# Patient Record
Sex: FEMALE | Race: WHITE
[De-identification: names, ages, dates, MRNs, and addresses within clinical notes are randomized per-mention and may not be internally consistent; named-entity substitution may affect disease eponyms.]

---

## 2021-10-04 ENCOUNTER — HOSPITAL ENCOUNTER (INPATIENT)
Dept: HOSPITAL 41 - JD.ED | Age: 81
LOS: 11 days | Discharge: NURSING, REHAB OR LONG-TERM CARE | DRG: 196 | End: 2021-10-15
Attending: HOSPITALIST | Admitting: HOSPITALIST
Payer: MEDICARE

## 2021-10-04 DIAGNOSIS — Z88.6: ICD-10-CM

## 2021-10-04 DIAGNOSIS — Z90.710: ICD-10-CM

## 2021-10-04 DIAGNOSIS — T79.7XXA: ICD-10-CM

## 2021-10-04 DIAGNOSIS — Z86.16: ICD-10-CM

## 2021-10-04 DIAGNOSIS — J84.10: ICD-10-CM

## 2021-10-04 DIAGNOSIS — R77.8: ICD-10-CM

## 2021-10-04 DIAGNOSIS — J93.9: ICD-10-CM

## 2021-10-04 DIAGNOSIS — J96.21: ICD-10-CM

## 2021-10-04 DIAGNOSIS — Z98.890: ICD-10-CM

## 2021-10-04 DIAGNOSIS — Z98.49: ICD-10-CM

## 2021-10-04 DIAGNOSIS — H81.09: ICD-10-CM

## 2021-10-04 DIAGNOSIS — B96.20: ICD-10-CM

## 2021-10-04 DIAGNOSIS — R06.00: ICD-10-CM

## 2021-10-04 DIAGNOSIS — Z99.81: ICD-10-CM

## 2021-10-04 DIAGNOSIS — Z88.5: ICD-10-CM

## 2021-10-04 DIAGNOSIS — Z88.8: ICD-10-CM

## 2021-10-04 DIAGNOSIS — T38.0X5A: ICD-10-CM

## 2021-10-04 DIAGNOSIS — Z87.01: ICD-10-CM

## 2021-10-04 DIAGNOSIS — Z66: ICD-10-CM

## 2021-10-04 DIAGNOSIS — N39.0: ICD-10-CM

## 2021-10-04 DIAGNOSIS — I95.9: ICD-10-CM

## 2021-10-04 DIAGNOSIS — K21.9: ICD-10-CM

## 2021-10-04 DIAGNOSIS — D72.829: ICD-10-CM

## 2021-10-04 DIAGNOSIS — I51.4: ICD-10-CM

## 2021-10-04 DIAGNOSIS — R19.7: ICD-10-CM

## 2021-10-04 DIAGNOSIS — I08.3: ICD-10-CM

## 2021-10-04 DIAGNOSIS — Z79.899: ICD-10-CM

## 2021-10-04 DIAGNOSIS — E87.6: ICD-10-CM

## 2021-10-04 DIAGNOSIS — I82.402: ICD-10-CM

## 2021-10-04 DIAGNOSIS — Z91.09: ICD-10-CM

## 2021-10-04 DIAGNOSIS — Z91.018: ICD-10-CM

## 2021-10-04 DIAGNOSIS — I71.2: ICD-10-CM

## 2021-10-04 DIAGNOSIS — R79.89: ICD-10-CM

## 2021-10-04 DIAGNOSIS — R09.02: Primary | ICD-10-CM

## 2021-10-04 PROCEDURE — 99285 EMERGENCY DEPT VISIT HI MDM: CPT

## 2021-10-04 PROCEDURE — 87186 SC STD MICRODIL/AGAR DIL: CPT

## 2021-10-04 PROCEDURE — 85379 FIBRIN DEGRADATION QUANT: CPT

## 2021-10-04 PROCEDURE — 36600 WITHDRAWAL OF ARTERIAL BLOOD: CPT

## 2021-10-04 PROCEDURE — 85025 COMPLETE CBC W/AUTO DIFF WBC: CPT

## 2021-10-04 PROCEDURE — 80053 COMPREHEN METABOLIC PANEL: CPT

## 2021-10-04 PROCEDURE — 82803 BLOOD GASES ANY COMBINATION: CPT

## 2021-10-04 PROCEDURE — 87086 URINE CULTURE/COLONY COUNT: CPT

## 2021-10-04 PROCEDURE — 71045 X-RAY EXAM CHEST 1 VIEW: CPT

## 2021-10-04 PROCEDURE — 84145 PROCALCITONIN (PCT): CPT

## 2021-10-04 PROCEDURE — 94640 AIRWAY INHALATION TREATMENT: CPT

## 2021-10-04 PROCEDURE — 93005 ELECTROCARDIOGRAM TRACING: CPT

## 2021-10-04 PROCEDURE — 36415 COLL VENOUS BLD VENIPUNCTURE: CPT

## 2021-10-04 PROCEDURE — 96375 TX/PRO/DX INJ NEW DRUG ADDON: CPT

## 2021-10-04 PROCEDURE — 96365 THER/PROPH/DIAG IV INF INIT: CPT

## 2021-10-04 PROCEDURE — 87088 URINE BACTERIA CULTURE: CPT

## 2021-10-04 PROCEDURE — 86140 C-REACTIVE PROTEIN: CPT

## 2021-10-04 PROCEDURE — 84484 ASSAY OF TROPONIN QUANT: CPT

## 2021-10-04 PROCEDURE — 83880 ASSAY OF NATRIURETIC PEPTIDE: CPT

## 2021-10-04 PROCEDURE — 87899 AGENT NOS ASSAY W/OPTIC: CPT

## 2021-10-04 PROCEDURE — 86738 MYCOPLASMA ANTIBODY: CPT

## 2021-10-04 PROCEDURE — 83735 ASSAY OF MAGNESIUM: CPT

## 2021-10-04 PROCEDURE — 81001 URINALYSIS AUTO W/SCOPE: CPT

## 2021-10-04 PROCEDURE — 71275 CT ANGIOGRAPHY CHEST: CPT

## 2021-10-04 RX ADMIN — Medication PRN ML: at 15:30

## 2021-10-04 RX ADMIN — Medication PRN ML: at 18:12

## 2021-10-04 NOTE — EDM.PDOC
<Lul Oviedo - Last Filed: 10/05/21 11:12>





ED HPI GENERAL MEDICAL PROBLEM





- General


Chief Complaint: Respiratory Problem


Stated Complaint: LOW OXYGEN


Time Seen by Provider: 10/04/21 15:07





- Related Data


                                    Allergies











Allergy/AdvReac Type Severity Reaction Status Date / Time


 


acetaminophen Allergy  Rash Verified 10/04/21 17:26


 


Beef Containing Products Allergy  Hives Verified 10/05/21 12:26


 


Pork/Porcine Containing Allergy  Hives Verified 10/05/21 12:25





Products     


 


soap Allergy  Rash Verified 10/05/21 12:27


 


codeine AdvReac  Nausea and Verified 10/05/21 12:25





   Vomiting  











Home Meds: 


                                    Home Meds





Cholecalciferol (Vitamin D3) [Vitamin D] 4,000 unit PO DAILY 10/05/21 [History]


Fish Oil/Borage/Flax/Om3,6,9 1 [Omega 3-6-9 Complex Softgel] 1 each PO DAILY 10

/05/21 [History]


Mv-Mn/Folic AC/Calcium/Vit K1 [Women 50 Plus Multivit Adv Tab] 1 tab PO DAILY 

10/05/21 [History]


Zinc 1 tab PO DAILY 10/05/21 [History]











Course





- Re-Assessments/Exams


Free Text/Narrative Re-Assessment/Exam: 





10/05/21 08:22.  Have assumed care from Dr Ugalde after another change of 

shift.  I agree with hx and exam initially done by JSUTINE Lugo about 18 hrs ago. 

 


Pt is reported to have done OK during the night, continues on high flow 02 60 L,

 95 %, sats currently 95 to 96 %.  RT reported did try lower her her O2 but sats

 are reported to have fallen with that.  We continue to wait for a bed to open 

up somewhere in the region.  


Looking over her chart I see that the CT report did report increased bilat 

density compatable with fibrosis, consider superimposed pnemonia.  However there

 is no report of worsening cough, fever or chills so pneumonia was not the 

primary consideration last evening.  The greater concern was her increased 

oxygen demand and her elevated trop of 0.127. 


Have ordered a repeat trop.  Have ordered solumedrol 125 mg IV and a duoneb.  

Will see if we can start weaning her oxygen requirement as we further await a 

bed.  Of note she has requested to be NALINI ZHAO. 





10/05/21 09:07.  On further review of labs I see there is a urine that has come 

back showing UTI, 1 + leuk Pos.,  20-30 WBC.  Have ordered a urine culture, 

rocephin 1 gram IV.  I am informed that the neb treatment helped her, feels like

 it has helped her breathing





10/05/21 09:08








Departure





- Departure


Time of Disposition: 11:00


Disposition: Admitted As Inpatient 68


Condition: Serious


Clinical Impression: 


 Hypoxia





Dyspnea


Qualifiers:


 Dyspnea type: unspecified Qualified Code(s): R06.00 - Dyspnea, unspecified





UTI (urinary tract infection)


Qualifiers:


 Urinary tract infection type: site unspecified Hematuria presence: without 

hematuria Qualified Code(s): N39.0 - Urinary tract infection, site not specified








- Discharge Information





ED Communication





- Discussed Case With (1)


Discussed Case With (1): Admitting Provider ( discussed with Dr Eid, decision 

to admit at about 1100.)





<Hilton Lugo - Last Filed: 10/08/21 11:07>





ED HPI GENERAL MEDICAL PROBLEM





- General


Source of Information: Reports: Patient, Family


History Limitations: Reports: No Limitations





- History of Present Illness


INITIAL COMMENTS - FREE TEXT/NARRATIVE: 





81-year-old female presents the emergency department with complaints of low 

oxygen saturations.  Per the patient and her  report, the patient was 

hospitalized at [facility] in [place] for 2 weeks for Covid pneumonia.  

She states that she has been home approximately 2 weeks on home oxygen.  Prior 

to this she was not oxygen dependent.  Patient's  reports that in the 

middle the night last night, the patient's O2 saturations dropped down to a low 

as low as 50% and he increased her oxygen to 8 L per nasal cannula.  Patient 

denies any significant medical history and states she only takes Ativan twice a 

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  She does not have a history of smoking.  She denies

 any recent fever, chills, nausea, vomiting or diarrhea.  She denies any urinary

 symptoms.





Past Medical History


Respiratory History: Reports: Other (See Below)


Other Respiratory History: meiners disease; covid pneumonia


Gastrointestinal History: Reports: GERD





- Infectious Disease History


Infectious Disease History: Reports: Chicken Pox, Measles, Mumps





- Past Surgical History


HEENT Surgical History: Reports: Adenoidectomy, Cataract Surgery, Tonsillectomy


GI Surgical History: Reports: Hernia, Inguinal


Female  Surgical History: Reports: Hysterectomy





Social & Family History





- Tobacco Use


Tobacco Use Status *Q: Never Tobacco User





- Caffeine Use


Caffeine Use: Reports: Coffee, Tea





- Recreational Drug Use


Recreational Drug Use: No





ED ROS GENERAL





- Review of Systems


Review Of Systems: Comprehensive ROS is negative, except as noted in HPI.





ED EXAM, GENERAL





- Physical Exam


Exam: See Below


Exam Limited By: No Limitations


General Appearance: Alert, WD/WN, Moderate Distress


Ears: Normal External Exam, Hearing Grossly Normal


Nose: Normal Inspection


Throat/Mouth: Normal Inspection, Normal Lips, Normal Voice, No Airway Compromise


Head: Atraumatic


Neck: Normal Inspection, Supple


Respiratory/Chest: Chest Non-Tender, Respiratory Distress, Decreased Breath 

Sounds, Crackles (Left lower lobe)


Cardiovascular: Normal Peripheral Pulses, No Edema, No Murmur, Tachycardia


Peripheral Pulses: 2+: Radial (L), Radial (R)


GI/Abdominal: Normal Bowel Sounds, Soft, Non-Tender, No Distention


 (Female) Exam: Deferred


Rectal (Female) Exam: Deferred


Back Exam: Normal Inspection


Extremities: Normal Inspection


Neurological: Alert, Oriented, Normal Cognition


Psychiatric: Normal Affect, Normal Mood


Skin Exam: Warm, Dry, Intact, Normal Color, No Rash


Lymphatic: No Adenopathy


  ** #1 Interpretation


EKG Date: 10/04/21


Time: 15:28


Rhythm: NSR


Rate (Beats/Min): 109


Axis: Normal


P-Wave: Present


QRS: Normal


ST-T: Normal


QT: Normal


Comparison: NA - No Prior EKG


EKG Interpretation Comments: 





Per Dr. Oviedo interpretation: Sinus tach at a rate of 109; LAFB; very mild ST 

elevation in V2 and V3





Course





- Vital Signs


Text/Narrative:: 





Attestation above, patient presents with a history of Covid.  Patient was sent 

home on home O2.  Over the night, patient's O2 saturations dropped into the 50s 

and her oxygen was increased to 8 L per nasal cannula per the patient's .

  At the time of my exam, the patient is currently on a nonrebreather mask and 

O2 saturations are at about 90 to 91%.  She is dyspneic at rest but only to 

speak in a few word sentences.  Physical exam reveals crackles noted to the left

 posterior lobe.  I have ordered labs to include CBC, CMP, magnesium, C-reactive

 protein, troponin, pro BNP, portable chest x-ray and an EKG.  We will also 

obtain ABGs.  Will order an albuterol nebulizer for this patient.


Last Recorded V/S: 


                                Last Vital Signs











Temp  97.2 F   10/08/21 03:11


 


Pulse  104 H  10/08/21 03:11


 


Resp  28 H  10/08/21 03:11


 


BP  146/94 H  10/08/21 03:11


 


Pulse Ox  93 L  10/08/21 08:59














- Orders/Labs/Meds


Orders: 


                                Medication Orders





Albuterol (Albuterol 0.083% 2.5 Mg/3 Ml Neb Soln)  2.5 mg NEB Q2H PRN


   PRN Reason: Shortness Of Breath/wheezing


Albuterol/Ipratropium (Albuterol/Ipratropium 3.0-0.5 Mg/3 Ml Neb Soln)  3 ml NEB

 QIDRT WILLEM


   Last Admin: 10/08/21 08:59  Dose: 3 ml


   Documented by: JASPAL


   Admin: 10/08/21 05:59  Dose: 3 ml


   Documented by: ILANA


   Admin: 10/07/21 20:18  Dose: 3 ml


   Documented by: ILANA


   Admin: 10/07/21 15:02  Dose: 3 ml


   Documented by: TRISH


   Admin: 10/07/21 09:01  Dose: 3 ml


   Documented by: TRISH


   Admin: 10/07/21 05:55  Dose: 3 ml


   Documented by: ILANA


   Admin: 10/06/21 20:25  Dose: 3 ml


   Documented by: ILANA


   Admin: 10/06/21 14:59  Dose: 3 ml


   Documented by: TRISH


   Admin: 10/06/21 09:55  Dose: 3 ml


   Documented by: TRISH


   Admin: 10/06/21 06:19  Dose: 3 ml


   Documented by: DE


   Admin: 10/05/21 21:50  Dose: 3 ml


   Documented by: DE


   Admin: 10/05/21 18:27 Dose:  Not Given


   Documented by: BRADEN


   Admin: 10/05/21 13:43  Dose: 3 ml


   Documented by: BRADEN


Docusate Sodium (Docusate Sodium 100 Mg Cap)  100 mg PO Q12H PRN


   PRN Reason: Constipation


   Last Admin: 10/07/21 09:12  Dose: 100 mg


   Documented by: EDEEGRA


Enoxaparin Sodium (Enoxaparin 60 Mg/0.6 Ml Syringe)  60 mg SUBCUT Q12H [facility]


   Last Admin: 10/08/21 09:56  Dose: 60 mg


   Documented by: BRUCE


   Admin: 10/07/21 22:00  Dose: 60 mg


   Documented by: JENNIFER


Furosemide (Furosemide 20 Mg/2 Ml Vial)  20 mg IVPUSH DAILY [facility]


Guaifenesin (Guaifenesin 600 Mg Tab.Er)  600 mg PO BID [facility]


   Last Admin: 10/08/21 09:56  Dose: 600 mg


   Documented by: BRUCE


   Admin: 10/07/21 22:00  Dose: 600 mg


   Documented by: JENNIFER


   Admin: 10/07/21 09:12  Dose: 600 mg


   Documented by: KORY


   Admin: 10/06/21 21:48  Dose: 600 mg


   Documented by: JENNIFER


   Admin: 10/06/21 08:57  Dose: 600 mg


   Documented by: LAYTON


   Admin: 10/05/21 21:33  Dose: 600 mg


   Documented by: JENNIFER


   Admin: 10/05/21 15:56  Dose: 600 mg


   Documented by: GILES


Potassium Chloride 10 meq/ (Premix)  100 mls @ 100 mls/hr IV Q1H [facility]


   Stop: 10/08/21 12:59


   Last Admin: 10/08/21 09:55  Dose: 100 mls/hr


   Documented by: BRUCE


Sodium Chloride (Normal Saline)  250 mls @ 40 mls/hr IV ASDIRECTED [facility]


   Last Admin: 10/08/21 09:54  Dose: 40 mls/hr


   Documented by: BRUCE


Methylprednisolone Sodium Succinate (Methylprednisolone Sodium Succinate 40 Mg/1

 Ml Sdv)  60 mg IVPUSH Q8H [facility]


   Last Admin: 10/08/21 09:39  Dose: 60 mg


   Documented by: BRUCE


Ondansetron HCl (Ondansetron 4 Mg/2 Ml Sdv)  4 mg IV Q6H PRN


   PRN Reason: Nausea/Vomiting


Sodium Chloride (Sodium Chloride 0.9% 10 Ml Syringe)  10 ml FLUSH ASDIRECTED PRN


   PRN Reason: Keep Vein Open


   Last Admin: 10/04/21 18:12  Dose: 10 ml


   Documented by: JOSE ANGEL


   Admin: 10/04/21 15:30  Dose: 10 ml


   Documented by: CRYSTAL


Trimethoprim/Sulfamethoxazole (Sulfamethoxazole/Trimethoprim 800-160 Mg Tab)  1 

tab PO BID [facility]


   Last Admin: 10/08/21 09:56  Dose: 1 tab


   Documented by: BRUCE


Warfarin Sodium (Pharmacy To Dose - Warfarin)  1 dose .XX ASDIRECTED PRN


   PRN Reason: RX TOD DOSE WARFARIN


Warfarin Sodium (Warfarin 4 Mg Tab)  4 mg PO QPM [facility]


   Stop: 10/08/21 18:01








Labs: 


                                Laboratory Tests











  10/04/21 10/04/21 10/04/21 Range/Units





  15:10 15:10 15:10 


 


WBC  8.57    (3.98-10.04)  K/mm3


 


RBC  4.28    (3.98-5.22)  M/mm3


 


Hgb  13.0    (11.2-15.7)  gm/dl


 


Hct  38.5    (34.1-44.9)  %


 


MCV  90.0    (79.4-94.8)  fl


 


MCH  30.4    (25.6-32.2)  pg


 


MCHC  33.8    (32.2-35.5)  g/dl


 


RDW Std Deviation  45.2    (36.4-46.3)  fL


 


Plt Count  250    (182-369)  K/mm3


 


MPV  10.3    (9.4-12.3)  fl


 


Neut % (Auto)  75.7 H    (34.0-71.1)  %


 


Lymph % (Auto)  9.9 L    (19.3-51.7)  %


 


Mono % (Auto)  13.3 H    (4.7-12.5)  %


 


Eos % (Auto)  0.5 L    (0.7-5.8)  


 


Baso % (Auto)  0.4    (0.1-1.2)  %


 


Neut # (Auto)  6.49 H    (1.56-6.13)  K/mm3


 


Lymph # (Auto)  0.85 L    (1.18-3.74)  K/mm3


 


Mono # (Auto)  1.14 H    (0.24-0.36)  K/mm3


 


Eos # (Auto)  0.04    (0.04-0.36)  K/mm3


 


Baso # (Auto)  0.03    (0.01-0.08)  K/mm3


 


D-Dimer, Quantitative   8.16 H   (0.19-0.50)  mg/L


 


Puncture Site     


 


ABG pH     (7.35-7.45)  


 


ABG pCO2     (35.0-45.0)  mmHg


 


ABG pO2     (80.0-100.0)  mmHg


 


ABG HCO3     (22.0-26.0)  meq/L


 


ABG O2 Saturation     (96.0-97.0)  %


 


ABG Base Excess     (-2-2.0)  


 


Gianni Test     


 


A-a Gradient     mmHg


 


Oxygen Flow Rate     


 


FiO2     (21..00)  %


 


Sodium    129 L  (136-145)  mEq/L


 


Potassium    3.8  (3.5-5.1)  mEq/L


 


Chloride    94 L  ()  mEq/L


 


Carbon Dioxide    24  (21-32)  mEq/L


 


Anion Gap    14.8  (5-15)  


 


BUN    15  (7-18)  mg/dL


 


Creatinine    0.6  (0.55-1.02)  mg/dL


 


Est Cr Clr Drug Dosing    57.37  mL/min


 


Estimated GFR (MDRD)    > 60  (>60)  mL/min


 


BUN/Creatinine Ratio    25.0 H  (14-18)  


 


Glucose    126 H  (70-99)  mg/dL


 


Calcium    8.7  (8.5-10.1)  mg/dL


 


Magnesium    1.8  (1.8-2.4)  mg/dL


 


Total Bilirubin    0.6  (0.2-1.0)  mg/dL


 


AST    36  (15-37)  U/L


 


ALT    31  (14-59)  U/L


 


Alkaline Phosphatase    86  ()  U/L


 


Troponin I    0.127 H*  (0.00-0.056)  ng/mL


 


C-Reactive Protein    29.3 H*  (<1.0)  mg/dL


 


NT-Pro-B Natriuret Pep     (0-450)  pg/mL


 


Total Protein    7.1  (6.4-8.2)  g/dl


 


Albumin    2.1 L  (3.4-5.0)  g/dl


 


Globulin    5.0  gm/dL


 


Albumin/Globulin Ratio    0.4 L  (1-2)  


 


Procalcitonin     ng/mL


 


Urine Color     (Yellow)  


 


Urine Appearance     (Clear)  


 


Urine pH     (5.0-8.0)  


 


Ur Specific Gravity     (1.005-1.030)  


 


Urine Protein     (Negative)  


 


Urine Glucose (UA)     (Negative)  


 


Urine Ketones     (Negative)  


 


Urine Occult Blood     (Negative)  


 


Urine Nitrite     (Negative)  


 


Urine Bilirubin     (Negative)  


 


Urine Urobilinogen     (0.2-1.0)  


 


Ur Leukocyte Esterase     (Negative)  


 


Urine RBC     (0-5)  /hpf


 


Urine WBC     (0-5)  /hpf


 


Urine WBC Clumps     (NOT SEEN)  /hpf


 


Ur Squamous Epith Cells     (0-5)  /hpf


 


Urine Bacteria     (FEW)  /hpf


 


Urine Mucus     (FEW)  /hpf


 


Mycoplasma pneumon IgM     (NEGATIVE)  














  10/04/21 10/04/21 10/04/21 Range/Units





  15:10 15:18 19:10 


 


WBC     (3.98-10.04)  K/mm3


 


RBC     (3.98-5.22)  M/mm3


 


Hgb     (11.2-15.7)  gm/dl


 


Hct     (34.1-44.9)  %


 


MCV     (79.4-94.8)  fl


 


MCH     (25.6-32.2)  pg


 


MCHC     (32.2-35.5)  g/dl


 


RDW Std Deviation     (36.4-46.3)  fL


 


Plt Count     (182-369)  K/mm3


 


MPV     (9.4-12.3)  fl


 


Neut % (Auto)     (34.0-71.1)  %


 


Lymph % (Auto)     (19.3-51.7)  %


 


Mono % (Auto)     (4.7-12.5)  %


 


Eos % (Auto)     (0.7-5.8)  


 


Baso % (Auto)     (0.1-1.2)  %


 


Neut # (Auto)     (1.56-6.13)  K/mm3


 


Lymph # (Auto)     (1.18-3.74)  K/mm3


 


Mono # (Auto)     (0.24-0.36)  K/mm3


 


Eos # (Auto)     (0.04-0.36)  K/mm3


 


Baso # (Auto)     (0.01-0.08)  K/mm3


 


D-Dimer, Quantitative     (0.19-0.50)  mg/L


 


Puncture Site   Lt radial   


 


ABG pH   7.44   (7.35-7.45)  


 


ABG pCO2   30.9 L   (35.0-45.0)  mmHg


 


ABG pO2   58.0 L   (80.0-100.0)  mmHg


 


ABG HCO3   20.5 L   (22.0-26.0)  meq/L


 


ABG O2 Saturation   86.0 L   (96.0-97.0)  %


 


ABG Base Excess   -2.3 L   (-2-2.0)  


 


Gianni Test   Positive   


 


A-a Gradient   474   mmHg


 


Oxygen Flow Rate   15.0   


 


FiO2   80.00   (21..00)  %


 


Sodium     (136-145)  mEq/L


 


Potassium     (3.5-5.1)  mEq/L


 


Chloride     ()  mEq/L


 


Carbon Dioxide     (21-32)  mEq/L


 


Anion Gap     (5-15)  


 


BUN     (7-18)  mg/dL


 


Creatinine     (0.55-1.02)  mg/dL


 


Est Cr Clr Drug Dosing     mL/min


 


Estimated GFR (MDRD)     (>60)  mL/min


 


BUN/Creatinine Ratio     (14-18)  


 


Glucose     (70-99)  mg/dL


 


Calcium     (8.5-10.1)  mg/dL


 


Magnesium     (1.8-2.4)  mg/dL


 


Total Bilirubin     (0.2-1.0)  mg/dL


 


AST     (15-37)  U/L


 


ALT     (14-59)  U/L


 


Alkaline Phosphatase     ()  U/L


 


Troponin I    0.156 H*  (0.00-0.056)  ng/mL


 


C-Reactive Protein     (<1.0)  mg/dL


 


NT-Pro-B Natriuret Pep  3117 H    (0-450)  pg/mL


 


Total Protein     (6.4-8.2)  g/dl


 


Albumin     (3.4-5.0)  g/dl


 


Globulin     gm/dL


 


Albumin/Globulin Ratio     (1-2)  


 


Procalcitonin     ng/mL


 


Urine Color     (Yellow)  


 


Urine Appearance     (Clear)  


 


Urine pH     (5.0-8.0)  


 


Ur Specific Gravity     (1.005-1.030)  


 


Urine Protein     (Negative)  


 


Urine Glucose (UA)     (Negative)  


 


Urine Ketones     (Negative)  


 


Urine Occult Blood     (Negative)  


 


Urine Nitrite     (Negative)  


 


Urine Bilirubin     (Negative)  


 


Urine Urobilinogen     (0.2-1.0)  


 


Ur Leukocyte Esterase     (Negative)  


 


Urine RBC     (0-5)  /hpf


 


Urine WBC     (0-5)  /hpf


 


Urine WBC Clumps     (NOT SEEN)  /hpf


 


Ur Squamous Epith Cells     (0-5)  /hpf


 


Urine Bacteria     (FEW)  /hpf


 


Urine Mucus     (FEW)  /hpf


 


Mycoplasma pneumon IgM     (NEGATIVE)  














  10/04/21 10/05/21 10/05/21 Range/Units





  23:30 07:56 09:36 


 


WBC     (3.98-10.04)  K/mm3


 


RBC     (3.98-5.22)  M/mm3


 


Hgb     (11.2-15.7)  gm/dl


 


Hct     (34.1-44.9)  %


 


MCV     (79.4-94.8)  fl


 


MCH     (25.6-32.2)  pg


 


MCHC     (32.2-35.5)  g/dl


 


RDW Std Deviation     (36.4-46.3)  fL


 


Plt Count     (182-369)  K/mm3


 


MPV     (9.4-12.3)  fl


 


Neut % (Auto)     (34.0-71.1)  %


 


Lymph % (Auto)     (19.3-51.7)  %


 


Mono % (Auto)     (4.7-12.5)  %


 


Eos % (Auto)     (0.7-5.8)  


 


Baso % (Auto)     (0.1-1.2)  %


 


Neut # (Auto)     (1.56-6.13)  K/mm3


 


Lymph # (Auto)     (1.18-3.74)  K/mm3


 


Mono # (Auto)     (0.24-0.36)  K/mm3


 


Eos # (Auto)     (0.04-0.36)  K/mm3


 


Baso # (Auto)     (0.01-0.08)  K/mm3


 


D-Dimer, Quantitative     (0.19-0.50)  mg/L


 


Puncture Site     


 


ABG pH     (7.35-7.45)  


 


ABG pCO2     (35.0-45.0)  mmHg


 


ABG pO2     (80.0-100.0)  mmHg


 


ABG HCO3     (22.0-26.0)  meq/L


 


ABG O2 Saturation     (96.0-97.0)  %


 


ABG Base Excess     (-2-2.0)  


 


Gianni Test     


 


A-a Gradient     mmHg


 


Oxygen Flow Rate     


 


FiO2     (21..00)  %


 


Sodium     (136-145)  mEq/L


 


Potassium     (3.5-5.1)  mEq/L


 


Chloride     ()  mEq/L


 


Carbon Dioxide     (21-32)  mEq/L


 


Anion Gap     (5-15)  


 


BUN     (7-18)  mg/dL


 


Creatinine     (0.55-1.02)  mg/dL


 


Est Cr Clr Drug Dosing     mL/min


 


Estimated GFR (MDRD)     (>60)  mL/min


 


BUN/Creatinine Ratio     (14-18)  


 


Glucose     (70-99)  mg/dL


 


Calcium     (8.5-10.1)  mg/dL


 


Magnesium     (1.8-2.4)  mg/dL


 


Total Bilirubin     (0.2-1.0)  mg/dL


 


AST     (15-37)  U/L


 


ALT     (14-59)  U/L


 


Alkaline Phosphatase     ()  U/L


 


Troponin I   0.067 H*   (0.00-0.056)  ng/mL


 


C-Reactive Protein     (<1.0)  mg/dL


 


NT-Pro-B Natriuret Pep     (0-450)  pg/mL


 


Total Protein     (6.4-8.2)  g/dl


 


Albumin     (3.4-5.0)  g/dl


 


Globulin     gm/dL


 


Albumin/Globulin Ratio     (1-2)  


 


Procalcitonin    0.29 H  ng/mL


 


Urine Color  Yellow    (Yellow)  


 


Urine Appearance  Slt cloudy H    (Clear)  


 


Urine pH  6.0    (5.0-8.0)  


 


Ur Specific Gravity  1.010    (1.005-1.030)  


 


Urine Protein  1+ H    (Negative)  


 


Urine Glucose (UA)  Negative    (Negative)  


 


Urine Ketones  3+ H    (Negative)  


 


Urine Occult Blood  2+ H    (Negative)  


 


Urine Nitrite  Negative    (Negative)  


 


Urine Bilirubin  Negative    (Negative)  


 


Urine Urobilinogen  0.2    (0.2-1.0)  


 


Ur Leukocyte Esterase  1+ H    (Negative)  


 


Urine RBC  5-10 H    (0-5)  /hpf


 


Urine WBC  20-30 H    (0-5)  /hpf


 


Urine WBC Clumps  Rare    (NOT SEEN)  /hpf


 


Ur Squamous Epith Cells  0-5    (0-5)  /hpf


 


Urine Bacteria  Moderate H    (FEW)  /hpf


 


Urine Mucus  Few    (FEW)  /hpf


 


Mycoplasma pneumon IgM     (NEGATIVE)  














  10/05/21 Range/Units





  09:36 


 


WBC   (3.98-10.04)  K/mm3


 


RBC   (3.98-5.22)  M/mm3


 


Hgb   (11.2-15.7)  gm/dl


 


Hct   (34.1-44.9)  %


 


MCV   (79.4-94.8)  fl


 


MCH   (25.6-32.2)  pg


 


MCHC   (32.2-35.5)  g/dl


 


RDW Std Deviation   (36.4-46.3)  fL


 


Plt Count   (182-369)  K/mm3


 


MPV   (9.4-12.3)  fl


 


Neut % (Auto)   (34.0-71.1)  %


 


Lymph % (Auto)   (19.3-51.7)  %


 


Mono % (Auto)   (4.7-12.5)  %


 


Eos % (Auto)   (0.7-5.8)  


 


Baso % (Auto)   (0.1-1.2)  %


 


Neut # (Auto)   (1.56-6.13)  K/mm3


 


Lymph # (Auto)   (1.18-3.74)  K/mm3


 


Mono # (Auto)   (0.24-0.36)  K/mm3


 


Eos # (Auto)   (0.04-0.36)  K/mm3


 


Baso # (Auto)   (0.01-0.08)  K/mm3


 


D-Dimer, Quantitative   (0.19-0.50)  mg/L


 


Puncture Site   


 


ABG pH   (7.35-7.45)  


 


ABG pCO2   (35.0-45.0)  mmHg


 


ABG pO2   (80.0-100.0)  mmHg


 


ABG HCO3   (22.0-26.0)  meq/L


 


ABG O2 Saturation   (96.0-97.0)  %


 


ABG Base Excess   (-2-2.0)  


 


Gianni Test   


 


A-a Gradient   mmHg


 


Oxygen Flow Rate   


 


FiO2   (21..00)  %


 


Sodium   (136-145)  mEq/L


 


Potassium   (3.5-5.1)  mEq/L


 


Chloride   ()  mEq/L


 


Carbon Dioxide   (21-32)  mEq/L


 


Anion Gap   (5-15)  


 


BUN   (7-18)  mg/dL


 


Creatinine   (0.55-1.02)  mg/dL


 


Est Cr Clr Drug Dosing   mL/min


 


Estimated GFR (MDRD)   (>60)  mL/min


 


BUN/Creatinine Ratio   (14-18)  


 


Glucose   (70-99)  mg/dL


 


Calcium   (8.5-10.1)  mg/dL


 


Magnesium   (1.8-2.4)  mg/dL


 


Total Bilirubin   (0.2-1.0)  mg/dL


 


AST   (15-37)  U/L


 


ALT   (14-59)  U/L


 


Alkaline Phosphatase   ()  U/L


 


Troponin I   (0.00-0.056)  ng/mL


 


C-Reactive Protein   (<1.0)  mg/dL


 


NT-Pro-B Natriuret Pep   (0-450)  pg/mL


 


Total Protein   (6.4-8.2)  g/dl


 


Albumin   (3.4-5.0)  g/dl


 


Globulin   gm/dL


 


Albumin/Globulin Ratio   (1-2)  


 


Procalcitonin   ng/mL


 


Urine Color   (Yellow)  


 


Urine Appearance   (Clear)  


 


Urine pH   (5.0-8.0)  


 


Ur Specific Gravity   (1.005-1.030)  


 


Urine Protein   (Negative)  


 


Urine Glucose (UA)   (Negative)  


 


Urine Ketones   (Negative)  


 


Urine Occult Blood   (Negative)  


 


Urine Nitrite   (Negative)  


 


Urine Bilirubin   (Negative)  


 


Urine Urobilinogen   (0.2-1.0)  


 


Ur Leukocyte Esterase   (Negative)  


 


Urine RBC   (0-5)  /hpf


 


Urine WBC   (0-5)  /hpf


 


Urine WBC Clumps   (NOT SEEN)  /hpf


 


Ur Squamous Epith Cells   (0-5)  /hpf


 


Urine Bacteria   (FEW)  /hpf


 


Urine Mucus   (FEW)  /hpf


 


Mycoplasma pneumon IgM  Negative  (NEGATIVE)  











Meds: 


Medications











Generic Name Dose Route Start Last Admin





  Trade Name Freq  PRN Reason Stop Dose Admin


 


Albuterol  2.5 mg  10/05/21 11:19 





  Albuterol 0.083% 2.5 Mg/3 Ml Neb Soln  NEB  





  Q2H PRN  





  Shortness Of Breath/wheezing  


 


Albuterol/Ipratropium  3 ml  10/05/21 16:00  10/08/21 08:59





  Albuterol/Ipratropium 3.0-0.5 Mg/3 Ml Neb Soln  NEB   3 ml





  QIDRT WILLEM   Administration


 


Docusate Sodium  100 mg  10/05/21 11:19  10/07/21 09:12





  Docusate Sodium 100 Mg Cap  PO   100 mg





  Q12H PRN   Administration





  Constipation  


 


Enoxaparin Sodium  60 mg  10/07/21 21:00  10/08/21 09:56





  Enoxaparin 60 Mg/0.6 Ml Syringe  SUBCUT   60 mg





  Q12H WILLEM   Administration


 


Furosemide  20 mg  10/09/21 09:00 





  Furosemide 20 Mg/2 Ml Vial  IVPUSH  





  DAILY WILLEM  


 


Guaifenesin  600 mg  10/05/21 15:30  10/08/21 09:56





  Guaifenesin 600 Mg Tab.Er  PO   600 mg





  BID WILLEM   Administration


 


Potassium Chloride 10 meq/  100 mls @ 100 mls/hr  10/08/21 07:00  10/08/21 09:55





  Premix  IV  10/08/21 12:59  100 mls/hr





  Q1H WILLEM   Administration


 


Sodium Chloride  250 mls @ 40 mls/hr  10/08/21 07:00  10/08/21 09:54





  Normal Saline  IV   40 mls/hr





  ASDIRECTED WILLEM   Administration


 


Methylprednisolone Sodium Succinate  60 mg  10/08/21 07:00  10/08/21 09:39





  Methylprednisolone Sodium Succinate 40 Mg/1 Ml Sdv  IVPUSH   60 mg





  Q8H WILLEM   Administration


 


Ondansetron HCl  4 mg  10/05/21 11:19 





  Ondansetron 4 Mg/2 Ml Sdv  IV  





  Q6H PRN  





  Nausea/Vomiting  


 


Sodium Chloride  10 ml  10/04/21 15:18  10/04/21 18:12





  Sodium Chloride 0.9% 10 Ml Syringe  FLUSH   10 ml





  ASDIRECTED PRN   Administration





  Keep Vein Open  


 


Trimethoprim/Sulfamethoxazole  1 tab  10/08/21 09:00  10/08/21 09:56





  Sulfamethoxazole/Trimethoprim 800-160 Mg Tab  PO   1 tab





  BID WILLEM   Administration


 


Warfarin Sodium  1 dose  10/07/21 13:15 





  Pharmacy To Dose - Warfarin  .XX  





  ASDIRECTED PRN  





  RX TOD DOSE WARFARIN  


 


Warfarin Sodium  4 mg  10/08/21 18:00 





  Warfarin 4 Mg Tab  PO  10/08/21 18:01 





  QPM WILLEM  














Discontinued Medications














Generic Name Dose Route Start Last Admin





  Trade Name Freq  PRN Reason Stop Dose Admin


 


Albuterol  2.5 mg  10/04/21 15:18  10/04/21 15:47





  Albuterol 0.083% 2.5 Mg/3 Ml Neb Soln  NEB  10/04/21 15:19  2.5 mg





  ONETIME ONE   Administration


 


Albuterol/Ipratropium  3 ml  10/05/21 08:20  10/05/21 08:37





  Albuterol/Ipratropium 3.0-0.5 Mg/3 Ml Neb Soln  NEB  10/05/21 08:21  3 ml





  ONETIME ONE   Administration


 


Apixaban  10 mg  10/06/21 09:00  10/07/21 09:13





  Apixaban 5 Mg Tab  PO  10/12/21 21:01  10 mg





  BID WILLEM   Administration


 


Enoxaparin Sodium  40 mg  10/05/21 09:00  10/05/21 14:49





  Enoxaparin 40 Mg/0.4 Ml Syringe  SUBCUT   40 mg





  DAILY WILLEM   Administration


 


Enoxaparin Sodium  20 mg  10/05/21 16:30  10/05/21 16:53





  Enoxaparin 60 Mg/0.6 Ml Syringe  SUBCUT  10/05/21 16:31  20 mg





  ONETIME ONE   Administration


 


Furosemide  40 mg  10/05/21 20:03  10/05/21 21:33





  Furosemide 40 Mg/4 Ml Vial  IVPUSH  10/05/21 20:04  40 mg





  NOW ONE   Administration


 


Furosemide  20 mg  10/06/21 07:00  10/08/21 06:34





  Furosemide 20 Mg/2 Ml Vial  IVPUSH   20 mg





  TIDMEALS WILLEM   Administration


 


Furosemide  20 mg  10/08/21 09:00 





  Furosemide 20 Mg/2 Ml Vial  IVPUSH  





  DAILY WILLEM  


 


Sodium Chloride  100 mls @ 60 mls/min  10/04/21 18:15  10/04/21 18:12





  Normal Saline  IV   60 mls/min





  ASDIRECTED WILLEM   Administration


 


Sodium Chloride  1,000 mls @ 250 mls/hr  10/04/21 22:15  10/04/21 22:14





  Normal Saline  IV   250 mls/hr





  ASDIRECTED WILLEM   Administration


 


Ceftriaxone Sodium 1 gm/  100 mls @ 200 mls/hr  10/05/21 08:56  10/05/21 09:20





  Sodium Chloride  IV  10/05/21 09:25  200 mls/hr





  ONETIME ONE   Administration


 


Vancomycin HCl 1 gm/ Sodium  250 mls @ 250 mls/hr  10/05/21 12:00  10/05/21 

14:26





  Chloride  IV   Not Given





  Q18H WILLEM  


 


Vancomycin HCl 1 gm/ Sodium  250 mls @ 250 mls/hr  10/05/21 13:30  10/05/21 

15:37





  Chloride  IV   Not Given





  Q18H WILLEM  


 


Piperacillin Sod/Tazobactam  100 mls @ 200 mls/hr  10/05/21 14:30  10/05/21 

14:19





  Sod 4.5 gm/ Sodium Chloride  IV  10/05/21 14:59  200 mls/hr





  ONETIME ONE   Administration


 


Piperacillin Sod/Tazobactam  100 mls @ 25 mls/hr  10/05/21 22:30  10/08/21 06:34





  Sod 4.5 gm/ Sodium Chloride  IV   25 mls/hr





  Q8H WILLEM   Administration


 


Vancomycin HCl 1 gm/ Sodium  250 mls @ 250 mls/hr  10/05/21 15:00  10/07/21 

22:00





  Chloride  IV  10/07/21 23:59  250 mls/hr





  Q18H WILLEM   Administration


 


Sodium Chloride  1,000 mls @ 100 mls/hr  10/05/21 18:45 





  Normal Saline  IV  





  ASDIRECTED WILLEM  


 


Vancomycin HCl 1 gm/ Sodium  250 mls @ 250 mls/hr  10/08/21 10:00 





  Chloride  IV  





  Q12H WILLEM  


 


Iopamidol  100 ml  10/04/21 18:11  10/04/21 18:12





  Iopamidol 755 Mg/Ml 100 Ml Bottle  IVPUSH  10/04/21 18:12  100 ml





  ONETIME ONE   Administration


 


Methylprednisolone Sodium Succinate  125 mg  10/05/21 08:20  10/05/21 08:25





  Methylprednisolone Sodium Succinate 125 Mg/2 Ml Sdv  IVPUSH  10/05/21 08:21  

125 mg





  ONETIME ONE   Administration


 


Potassium Chloride  10 meq  10/05/21 21:00  10/07/21 22:00





  Potassium Chloride 10 Meq Tab.Er  PO   10 meq





  TID WILLEM   Administration


 


Potassium Chloride  20 meq  10/06/21 07:20  10/06/21 08:37





  Potassium Chloride 20 Meq Tab.Er  PO  10/06/21 07:21  20 meq





  ONETIME ONE   Administration


 


Sodium Chloride  10 ml  10/04/21 18:11  10/04/21 20:31





  Sodium Chloride 0.9% 10 Ml Sdv  FLUSH  10/04/21 18:12  10 ml





  ONETIME ONE   Administration


 


Vancomycin HCl  1 dose  10/05/21 11:30 





  Pharmacy To Dose - Vancomycin  .XX  





  ASDIRECTED WILLEM  


 


Warfarin Sodium  4 mg  10/07/21 18:00  10/07/21 18:08





  Warfarin 4 Mg Tab  PO  10/07/21 18:01  4 mg





  QPM WILLEM   Administration














- Re-Assessments/Exams


Free Text/Narrative Re-Assessment/Exam: 





10/04/21 15:55


ABGs reveal a pH of 7.44, PCO2 30.9, PO2 58, HCO3 20.5 with a base excess of -

2.3 O2 saturation 86.9% on a nonrebreather mask.  Spoke with respiratory care 

regarding starting the patient on CPAP however they are unsure if there are any 

CPAP's left in this facility.  Respiratory therapist is going to check and let 

me know.


10/04/21 16:17


Respiratory therapist is going to start high flow O2 on this patient.


10/04/21 17:00


Hematology reveals a WBC of 8.57, hemoglobin 13.0, hematocrit 38.5, platelet 

count 250





Coagulation reveals a D-dimer of 8.16





Chemistry reveals a sodium of 129, potassium 3.8, chloride 94, carbon dioxide 

24, anion gap 14.8, BUN 15, creatinine 0.6, glucose 126, magnesium 1.8, troponin

 0 0.127, C-reactive protein 29.3, proBNP 3117





Patient is currently tolerating high flow O2 very well.  O2 saturations are 92%.

  Heart rate in the 1 teens in a sinus rhythm.  Patient denies having any chest 

pain or discomfort nor has she had any chest pain or discomfort.  With the 

elevation in the patient's D-dimer I have ordered a CTA of the chest to rule out

 PE.


10/04/21 17:25


Portable chest x-ray reveals scattered infiltrates noted bilaterally most 

prominent in the left lower lobe.


10/04/21 18:32


Radiologist impression CT of the chest: 1.  No findings of pulmonary embolism.


2.  Minimal left-sided pleural effusion.


3.  Diffuse emphysematous changes is seen.  Both lungs show diffuse increased 

density and uncertain how much of this represents diffuse fibrosis versus 

possible superimposed pneumonia.  Old comparison studies would be needed if 

available.


4.  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


5.  Other findings which are felt to be chronic as described above.





Patient will need to be hospitalized however there are no beds available here at

 this hospital or at either hospitals in [place].  I will call the [facility] transfer [place] in regards to finding a bed for this patient.


10/04/21 19:37


The [facility] transfer [place] calls to tell me that there are no beds 

available in [place] or [place].  They state that they will 

revisit attempting to find her bed placement in the morning.





The patient has stated that she request to be a DNR/DNI.


10/04/21 20:04


Phoned [facility] call in [place] regarding the patient having positive 

troponins.  They tell me that they are on diversion and have no beds available 

and are unable to take this patient in transfer.


10/04/21 22:17


Patient's blood pressure is noted to be 80/61.  Will order for the patient to 

receive 500 mils of normal saline over 2 hours.


10/04/21 23:21


Pt's blood pressure is 69/52.  I have ordered for nursing staff to give another 

500ml bolus wide open.  





I have reported off to Dr. Ugalde who will take over care of the patient.

## 2021-10-04 NOTE — CT
CT chest

 

Technique: Multiple axial sections were obtained through the chest.  

Intravenous contrast was utilized.  Study has been performed as a 

pulmonary angiogram protocol.

 

Comparison: No prior chest imaging is available.

 

Findings: Ascending aorta is mildly aneurysmal with AP dimension of 

4.4 cm.

 

Pulmonary arteries are well opacified.  No filling defects are seen to

 indicate pulmonary embolism.  

 

Small scattered mediastinal lymph nodes are seen which are felt to be 

within normal limits.  

 

Moderately large hiatal hernia is noted.  No pericardial thickening is

 seen.  Other visualized upper abdominal structures show a small cyst 

within the right kidney.

 

Lung window settings were reviewed which show diffuse emphysematous 

change.  There is diffuse opacity seen throughout both lungs.  

Uncertain how much of this opacity is due to prominent pulmonary 

fibrosis versus questionable pneumonia.  Old studies would be needed 

to differentiate.  Minimal left-sided pleural effusion is noted.

 

Bone window settings were reviewed.  Diffuse disc space narrowing and 

endplate spurring is noted within the spine.  No acute osseous 

abnormality is appreciated.

 

Impression:

1.  No findings of pulmonary embolism.

2.  Minimal left-sided pleural effusion.

3.  Diffuse emphysematous change is seen.  Both lungs show diffuse 

increased density and uncertain how much of this represents diffuse 

fibrosis versus possible superimposed pneumonia.  Old comparison 

studies would be needed if available.

4.  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

5.  Other findings which are felt to be chronic as described above.

 

Diagnostic code #3

## 2021-10-05 PROCEDURE — 8E0ZXY6 ISOLATION: ICD-10-PCS | Performed by: HOSPITALIST

## 2021-10-05 RX ADMIN — POTASSIUM CHLORIDE SCH MEQ: 750 TABLET, FILM COATED, EXTENDED RELEASE ORAL at 21:33

## 2021-10-05 NOTE — CR
Chest: Portable view of the chest was obtained.

 

Comparison: No prior chest imaging is available, subsequent chest CT 

performed later on the same day is available.

 

Heart size and mediastinum are within normal limits for portable 

technique.  Diffusely increased lung markings are seen throughout both

 sides of the chest.  As mentioned on chest CT, uncertain how much of 

this represents diffuse pulmonary fibrosis versus superimposed 

infectious change.  Degenerative change is noted within the spine with

 scoliosis.  Osteopenia is noted.

 

Impression:

1.  Diffuse increased lung markings either due to diffuse pulmonary 

fibrosis versus superimposed infectious change.

2.  Other chronic findings as described above.

 

Diagnostic code #3

## 2021-10-05 NOTE — US
Bilateral lower extremity deep venous ultrasound: Duplex and color 

Doppler evaluation was obtained of the right and left common femoral, 

proximal greater saphenous, superficial femoral, popliteal, posterior 

tibial and peroneal veins.

 

Comparison: No prior venous imaging is available.

 

Findings:

Left leg: Thrombus is seen within the left mid and distal superficial 

femoral, popliteal, posterior tibial and peroneal veins.  Proximal 

superficial femoral and common femoral veins are patent.

 

Right leg: Visualized veins show normal phasic flow, augmentation and 

compression.

 

Impression:

1.  Left lower extremity deep venous thrombosis as described above.

 

Diagnostic code #5

## 2021-10-05 NOTE — PCM.HP.2
<Nelson Garcia - Last Filed: 10/05/21 15:24>





H&P History of Present Illness





- General


Date of Service: 10/05/21


Admit Problem/Dx: 


                           Admission Diagnosis/Problem





Admission Diagnosis/Problem      Hypoxia








Source of Information: Patient, Old Records, Provider, RN, RN Notes Reviewed


History Limitations: Reports: No Limitations





- History of Present Illness


Initial Comments - Free Text/Narative: 


This is a 81-year-old female who presents to ED on 10/4/2021 with low oxygen 

saturations. Patient was hospitalized from 9/13/2021 through 9/27/2021 with 

COVID-19 pneumonia at [facility] in [place]. She was discharged home on 4 L

of oxygen with activity after completing 4 days of remdesivir and 10 days of 

Decadron. Of note patient was noted to have episodes of bradycardia and 

hypotension with heart rate in the 30s and 40s. She was noticed to have episodes

of Mobitz type I AV block and 2.5-second sinus pauses. It was believed this was 

due to remdesivir however this continued after stopping. Patient was to follow-

up with EP and have a 2-week cardiac monitor after discharge. Prior to 

presenting to the ED she was noted to have saturations in the low 50s. Patient's

 increased her oxygen to 8 L via nasal cannula. Denies any recent fever, 

chills, nausea, vomiting, diarrhea, urinary symptoms, or smoking history. Of 

note patient did have acute cystitis with an E. coli UTI well in the hospital in

[place] and completed 5 days of Rocephin there. 





In the ED twelve-lead EKG was obtained showing a sinus tachycardia at 109 bpm 

with a LAFB and very mild ST elevation in V2 and V3. She was placed on a 

nonrebreather mask which improved her saturations to 90 to 91%. She is noted to 

be dyspneic and only able to complete a few word sentences. Labs are obtained 

showing WBC of 8.57. Hemoglobin 13.0. Hematocrit 30.5. Platelet 250,000. 

Neutrophils are elevated 75.7%. D-dimer is very high at 8.16. Sodium is low at 

129. Potassium 3.8. Chloride 94. Carbon dioxide 24. Anion gap is 14.8. BUN is 

15. Creatinine 0.6. GFR greater than 60. Glucose is 126. Calcium 8.7. Magnesium 

1.8. Total bilirubin 0.6. AST is 36, ALT 31, alkaline phosphatase 86. Troponin 

is elevated at 0.127. CRP is very high at 29.3. Protein 7.1. Albumin 2.1. ABGs 

obtained in the left radial with a pH of 7.44. PCO2 of 30.9. PO2 of 58.0. HCO3 

of 20.5. O2 saturations 86%. Base excess is -2.3. Aa gradient is 15.0. This is 

obtained while on nonrebreather at 15 L. Facility was out of CPAP and BiPAP as 

they were being used on other patients and patient was started on high flow O2 

with saturations in the low 90s. CTA of the chest is obtained to rule out PE and

shows "1. No findings of pulmonary embolism. 2. Minimal left-sided pleural 

effusion. 3. Diffuse emphysematous changes seen. Both lungs show diffuse 

increased density uncertain how much of this represents diffuse fibrosis versus 

possible superimposed pneumonia. Old comparison studies would be needed if 

available. 4. 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 5. Other findings which are 

felt to be chronic as described above. Chest x-ray shows scattered infiltrates 

bilaterally most prominent in the left lower lobe. Plan was to transfer the 

patient for continued care due to elevated troponin however no beds are noted to

be available [place] or [place]. Patient does request to be 

a DNR/DNI. Patient is noted to have a blood pressure of 80/61 and is given a 500

mill fluid bolus over 2 hours. Repeat blood pressure is 69/52 and another 500 mL

fluid bolus is given. proBNP is 3117. Repeat troponin is elevated at 0.156. UA 

is obtained and is mildly positive with slightly cloudy urine, 1+ protein, 3+ 

ketones, 2+ occult blood, 1+ leukocyte esterase, 5-10 RBCs, 20-30 WBCs, rare WBC

clumps, and moderate bacteria. Patient remained in her ER. Attempts were made to

decrease O2 but these were unsuccessful. Repeat troponin returns decreased at 

0.067. She is given 1 dose of Rocephin in the ED. She is also given 125 mg Solu-

Medrol and a DuoNeb, which she reports greatly helped.





Ultimately inpatient bed does open up at our facility and she is admitted to the

floor on telemetry for management of her hypoxia, suspected pneumonia, elevated 

BNP, elevated troponin - likely demand ischemia, and questionable UTI. She is a 

DNR/DNI. Her PCP is Dr. Alonzo Monroe in [place]. She carries a history of 

Mnire's disease, GERD, and prior Covid pneumonia.








- Related Data


Allergies/Adverse Reactions: 


                                    Allergies











Allergy/AdvReac Type Severity Reaction Status Date / Time


 


acetaminophen Allergy  Rash Verified 10/04/21 17:26


 


Beef Containing Products Allergy  Hives Verified 10/05/21 12:26


 


Pork/Porcine Containing Allergy  Hives Verified 10/05/21 12:25





Products     


 


soap Allergy  Rash Verified 10/05/21 12:27


 


codeine AdvReac  Nausea and Verified 10/05/21 12:25





   Vomiting  











Home Medications: 


                                    Home Meds





Cholecalciferol (Vitamin D3) [Vitamin D] 4,000 unit PO DAILY 10/05/21 [History]


Fish Oil/Borage/Flax/Om3,6,9 1 [Omega 3-6-9 Complex Softgel] 1 each PO DAILY 

10/05/21 [History]


Mv-Mn/Folic AC/Calcium/Vit K1 [Women 50 Plus Multivit Adv Tab] 1 tab PO DAILY 

10/05/21 [History]


Zinc 1 tab PO DAILY 10/05/21 [History]











Past Medical History


Respiratory History: Reports: Other (See Below)


Other Respiratory History: meiners disease; covid pneumonia


Gastrointestinal History: Reports: GERD





- Infectious Disease History


Infectious Disease History: Reports: Chicken Pox, Measles, Mumps, Novel 

Coronavirus





- Past Surgical History


HEENT Surgical History: Reports: Adenoidectomy, Cataract Surgery, Tonsillectomy


GI Surgical History: Reports: Hernia, Inguinal


Female  Surgical History: Reports: Hysterectomy





Social & Family History





- Tobacco Use


Tobacco Use Status *Q: Never Tobacco User





- Caffeine Use


Caffeine Use: Reports: Coffee, Tea





- Recreational Drug Use


Recreational Drug Use: No





H&P Review of Systems





- Review of Systems:


Review Of Systems: See Below


General: Reports: Weakness (mild baseline ).  Denies: Fever, Chills, Malaise, 

Fatigue


HEENT: Reports: No Symptoms.  Denies: Headaches, Sore Throat


Pulmonary: Denies: Shortness of Breath, Wheezing


Cardiovascular: Reports: Dyspnea on Exertion, Orthopnea.  Denies: Chest Pain, 

Palpitations, Edema


Gastrointestinal: Reports: No Symptoms.  Denies: Abdominal Pain, Constipation, 

Nausea, Vomiting


Genitourinary: Reports: No Symptoms.  Denies: Pain


Musculoskeletal: Reports: No Symptoms.  Denies: Leg Pain, Foot Pain


Skin: Reports: No Symptoms.  Denies: Cyanosis


Psychiatric: Reports: No Symptoms.  Denies: Confusion


Neurological: Reports: No Symptoms.  Denies: Confusion, Dizziness, Headache, 

Numbness, Pre-Existing Deficit, Seizure, Syncope, Tingling, Trouble Speaking, 

Difficulty Walking, Weakness, Change in Speech, Gait Disturbance


Hematologic/Lymphatic: Reports: No Symptoms


Immunologic: Reports: No Symptoms





Exam





- Exam


Exam: See Below





- Vital Signs


Vital Signs: 


                                Last Vital Signs











Temp  98.7 F   10/04/21 14:08


 


Pulse  112 H  10/04/21 14:08


 


Resp  28 H  10/04/21 14:08


 


BP  138/94 H  10/04/21 14:08


 


Pulse Ox  90 L  10/05/21 08:38











Weight: 135 lb





- Exam


Quality Assessment: Supplemental Oxygen (High flow 60 L with 95% FiO2), DVT 

Prophylaxis.  No: Urinary Catheter


General: Alert, Oriented, Cooperative.  No: Mild Distress


HEENT: Conjunctiva Clear, EACs Clear, Mucosa Moist & Pink, Posterior Pharynx 

Clear


Neck: Supple, Trachea Midline


Lungs: Decreased Breath Sounds, Crackles.  No: Normal Respiratory Effort 

(Tachypnea), Rhonchi, Wheezing


Cardiovascular: Regular Rhythm, Tachycardia.  No: Systolic Murmur, Diastolic 

Murmur


GI/Abdominal Exam: Normal Bowel Sounds, Soft, Non-Tender, No Distention


 (Female) Exam: Deferred


Rectal (Female) Exam: Deferred


Back Exam: Normal Inspection, Full Range of Motion


Extremities: Normal Inspection, Normal Range of Motion, Non-Tender, No Pedal 

Edema, Normal Capillary Refill


Peripheral Pulses: 2+: Radial (L), Radial (R), Dorsalis Pedis (L), Dorsalis 

Pedis (R)


Skin: Warm, Dry, Intact


Neurological: Cranial Nerves Intact (Grossly )


Neuro Extensive - Mental Status: Alert, Oriented x3, Normal Mood/Affect





- Patient Data


Lab Results Last 24 hrs: 


                         Laboratory Results - last 24 hr











  10/04/21 10/04/21 10/04/21 Range/Units





  15:10 15:10 15:10 


 


WBC  8.57    (3.98-10.04)  K/mm3


 


RBC  4.28    (3.98-5.22)  M/mm3


 


Hgb  13.0    (11.2-15.7)  gm/dl


 


Hct  38.5    (34.1-44.9)  %


 


MCV  90.0    (79.4-94.8)  fl


 


MCH  30.4    (25.6-32.2)  pg


 


MCHC  33.8    (32.2-35.5)  g/dl


 


RDW Std Deviation  45.2    (36.4-46.3)  fL


 


Plt Count  250    (182-369)  K/mm3


 


MPV  10.3    (9.4-12.3)  fl


 


Neut % (Auto)  75.7 H    (34.0-71.1)  %


 


Lymph % (Auto)  9.9 L    (19.3-51.7)  %


 


Mono % (Auto)  13.3 H    (4.7-12.5)  %


 


Eos % (Auto)  0.5 L    (0.7-5.8)  


 


Baso % (Auto)  0.4    (0.1-1.2)  %


 


Neut # (Auto)  6.49 H    (1.56-6.13)  K/mm3


 


Lymph # (Auto)  0.85 L    (1.18-3.74)  K/mm3


 


Mono # (Auto)  1.14 H    (0.24-0.36)  K/mm3


 


Eos # (Auto)  0.04    (0.04-0.36)  K/mm3


 


Baso # (Auto)  0.03    (0.01-0.08)  K/mm3


 


D-Dimer, Quantitative   8.16 H   (0.19-0.50)  mg/L


 


Puncture Site     


 


ABG pH     (7.35-7.45)  


 


ABG pCO2     (35.0-45.0)  mmHg


 


ABG pO2     (80.0-100.0)  mmHg


 


ABG HCO3     (22.0-26.0)  meq/L


 


ABG O2 Saturation     (96.0-97.0)  %


 


ABG Base Excess     (-2-2.0)  


 


Gianni Test     


 


A-a Gradient     mmHg


 


Oxygen Flow Rate     


 


FiO2     (21..00)  %


 


Sodium    129 L  (136-145)  mEq/L


 


Potassium    3.8  (3.5-5.1)  mEq/L


 


Chloride    94 L  ()  mEq/L


 


Carbon Dioxide    24  (21-32)  mEq/L


 


Anion Gap    14.8  (5-15)  


 


BUN    15  (7-18)  mg/dL


 


Creatinine    0.6  (0.55-1.02)  mg/dL


 


Est Cr Clr Drug Dosing    57.37  mL/min


 


Estimated GFR (MDRD)    > 60  (>60)  mL/min


 


BUN/Creatinine Ratio    25.0 H  (14-18)  


 


Glucose    126 H  (70-99)  mg/dL


 


Calcium    8.7  (8.5-10.1)  mg/dL


 


Magnesium    1.8  (1.8-2.4)  mg/dL


 


Total Bilirubin    0.6  (0.2-1.0)  mg/dL


 


AST    36  (15-37)  U/L


 


ALT    31  (14-59)  U/L


 


Alkaline Phosphatase    86  ()  U/L


 


Troponin I    0.127 H*  (0.00-0.056)  ng/mL


 


C-Reactive Protein    29.3 H*  (<1.0)  mg/dL


 


NT-Pro-B Natriuret Pep     (0-450)  pg/mL


 


Total Protein    7.1  (6.4-8.2)  g/dl


 


Albumin    2.1 L  (3.4-5.0)  g/dl


 


Globulin    5.0  gm/dL


 


Albumin/Globulin Ratio    0.4 L  (1-2)  


 


Urine Color     (Yellow)  


 


Urine Appearance     (Clear)  


 


Urine pH     (5.0-8.0)  


 


Ur Specific Gravity     (1.005-1.030)  


 


Urine Protein     (Negative)  


 


Urine Glucose (UA)     (Negative)  


 


Urine Ketones     (Negative)  


 


Urine Occult Blood     (Negative)  


 


Urine Nitrite     (Negative)  


 


Urine Bilirubin     (Negative)  


 


Urine Urobilinogen     (0.2-1.0)  


 


Ur Leukocyte Esterase     (Negative)  


 


Urine RBC     (0-5)  /hpf


 


Urine WBC     (0-5)  /hpf


 


Urine WBC Clumps     (NOT SEEN)  /hpf


 


Ur Squamous Epith Cells     (0-5)  /hpf


 


Urine Bacteria     (FEW)  /hpf


 


Urine Mucus     (FEW)  /hpf














  10/04/21 10/04/21 10/04/21 Range/Units





  15:10 15:18 19:10 


 


WBC     (3.98-10.04)  K/mm3


 


RBC     (3.98-5.22)  M/mm3


 


Hgb     (11.2-15.7)  gm/dl


 


Hct     (34.1-44.9)  %


 


MCV     (79.4-94.8)  fl


 


MCH     (25.6-32.2)  pg


 


MCHC     (32.2-35.5)  g/dl


 


RDW Std Deviation     (36.4-46.3)  fL


 


Plt Count     (182-369)  K/mm3


 


MPV     (9.4-12.3)  fl


 


Neut % (Auto)     (34.0-71.1)  %


 


Lymph % (Auto)     (19.3-51.7)  %


 


Mono % (Auto)     (4.7-12.5)  %


 


Eos % (Auto)     (0.7-5.8)  


 


Baso % (Auto)     (0.1-1.2)  %


 


Neut # (Auto)     (1.56-6.13)  K/mm3


 


Lymph # (Auto)     (1.18-3.74)  K/mm3


 


Mono # (Auto)     (0.24-0.36)  K/mm3


 


Eos # (Auto)     (0.04-0.36)  K/mm3


 


Baso # (Auto)     (0.01-0.08)  K/mm3


 


D-Dimer, Quantitative     (0.19-0.50)  mg/L


 


Puncture Site   Lt radial   


 


ABG pH   7.44   (7.35-7.45)  


 


ABG pCO2   30.9 L   (35.0-45.0)  mmHg


 


ABG pO2   58.0 L   (80.0-100.0)  mmHg


 


ABG HCO3   20.5 L   (22.0-26.0)  meq/L


 


ABG O2 Saturation   86.0 L   (96.0-97.0)  %


 


ABG Base Excess   -2.3 L   (-2-2.0)  


 


Gianni Test   Positive   


 


A-a Gradient   474   mmHg


 


Oxygen Flow Rate   15.0   


 


FiO2   80.00   (21..00)  %


 


Sodium     (136-145)  mEq/L


 


Potassium     (3.5-5.1)  mEq/L


 


Chloride     ()  mEq/L


 


Carbon Dioxide     (21-32)  mEq/L


 


Anion Gap     (5-15)  


 


BUN     (7-18)  mg/dL


 


Creatinine     (0.55-1.02)  mg/dL


 


Est Cr Clr Drug Dosing     mL/min


 


Estimated GFR (MDRD)     (>60)  mL/min


 


BUN/Creatinine Ratio     (14-18)  


 


Glucose     (70-99)  mg/dL


 


Calcium     (8.5-10.1)  mg/dL


 


Magnesium     (1.8-2.4)  mg/dL


 


Total Bilirubin     (0.2-1.0)  mg/dL


 


AST     (15-37)  U/L


 


ALT     (14-59)  U/L


 


Alkaline Phosphatase     ()  U/L


 


Troponin I    0.156 H*  (0.00-0.056)  ng/mL


 


C-Reactive Protein     (<1.0)  mg/dL


 


NT-Pro-B Natriuret Pep  3117 H    (0-450)  pg/mL


 


Total Protein     (6.4-8.2)  g/dl


 


Albumin     (3.4-5.0)  g/dl


 


Globulin     gm/dL


 


Albumin/Globulin Ratio     (1-2)  


 


Urine Color     (Yellow)  


 


Urine Appearance     (Clear)  


 


Urine pH     (5.0-8.0)  


 


Ur Specific Gravity     (1.005-1.030)  


 


Urine Protein     (Negative)  


 


Urine Glucose (UA)     (Negative)  


 


Urine Ketones     (Negative)  


 


Urine Occult Blood     (Negative)  


 


Urine Nitrite     (Negative)  


 


Urine Bilirubin     (Negative)  


 


Urine Urobilinogen     (0.2-1.0)  


 


Ur Leukocyte Esterase     (Negative)  


 


Urine RBC     (0-5)  /hpf


 


Urine WBC     (0-5)  /hpf


 


Urine WBC Clumps     (NOT SEEN)  /hpf


 


Ur Squamous Epith Cells     (0-5)  /hpf


 


Urine Bacteria     (FEW)  /hpf


 


Urine Mucus     (FEW)  /hpf














  10/04/21 10/05/21 Range/Units





  23:30 07:56 


 


WBC    (3.98-10.04)  K/mm3


 


RBC    (3.98-5.22)  M/mm3


 


Hgb    (11.2-15.7)  gm/dl


 


Hct    (34.1-44.9)  %


 


MCV    (79.4-94.8)  fl


 


MCH    (25.6-32.2)  pg


 


MCHC    (32.2-35.5)  g/dl


 


RDW Std Deviation    (36.4-46.3)  fL


 


Plt Count    (182-369)  K/mm3


 


MPV    (9.4-12.3)  fl


 


Neut % (Auto)    (34.0-71.1)  %


 


Lymph % (Auto)    (19.3-51.7)  %


 


Mono % (Auto)    (4.7-12.5)  %


 


Eos % (Auto)    (0.7-5.8)  


 


Baso % (Auto)    (0.1-1.2)  %


 


Neut # (Auto)    (1.56-6.13)  K/mm3


 


Lymph # (Auto)    (1.18-3.74)  K/mm3


 


Mono # (Auto)    (0.24-0.36)  K/mm3


 


Eos # (Auto)    (0.04-0.36)  K/mm3


 


Baso # (Auto)    (0.01-0.08)  K/mm3


 


D-Dimer, Quantitative    (0.19-0.50)  mg/L


 


Puncture Site    


 


ABG pH    (7.35-7.45)  


 


ABG pCO2    (35.0-45.0)  mmHg


 


ABG pO2    (80.0-100.0)  mmHg


 


ABG HCO3    (22.0-26.0)  meq/L


 


ABG O2 Saturation    (96.0-97.0)  %


 


ABG Base Excess    (-2-2.0)  


 


Gianni Test    


 


A-a Gradient    mmHg


 


Oxygen Flow Rate    


 


FiO2    (21..00)  %


 


Sodium    (136-145)  mEq/L


 


Potassium    (3.5-5.1)  mEq/L


 


Chloride    ()  mEq/L


 


Carbon Dioxide    (21-32)  mEq/L


 


Anion Gap    (5-15)  


 


BUN    (7-18)  mg/dL


 


Creatinine    (0.55-1.02)  mg/dL


 


Est Cr Clr Drug Dosing    mL/min


 


Estimated GFR (MDRD)    (>60)  mL/min


 


BUN/Creatinine Ratio    (14-18)  


 


Glucose    (70-99)  mg/dL


 


Calcium    (8.5-10.1)  mg/dL


 


Magnesium    (1.8-2.4)  mg/dL


 


Total Bilirubin    (0.2-1.0)  mg/dL


 


AST    (15-37)  U/L


 


ALT    (14-59)  U/L


 


Alkaline Phosphatase    ()  U/L


 


Troponin I   0.067 H*  (0.00-0.056)  ng/mL


 


C-Reactive Protein    (<1.0)  mg/dL


 


NT-Pro-B Natriuret Pep    (0-450)  pg/mL


 


Total Protein    (6.4-8.2)  g/dl


 


Albumin    (3.4-5.0)  g/dl


 


Globulin    gm/dL


 


Albumin/Globulin Ratio    (1-2)  


 


Urine Color  Yellow   (Yellow)  


 


Urine Appearance  Slt cloudy H   (Clear)  


 


Urine pH  6.0   (5.0-8.0)  


 


Ur Specific Gravity  1.010   (1.005-1.030)  


 


Urine Protein  1+ H   (Negative)  


 


Urine Glucose (UA)  Negative   (Negative)  


 


Urine Ketones  3+ H   (Negative)  


 


Urine Occult Blood  2+ H   (Negative)  


 


Urine Nitrite  Negative   (Negative)  


 


Urine Bilirubin  Negative   (Negative)  


 


Urine Urobilinogen  0.2   (0.2-1.0)  


 


Ur Leukocyte Esterase  1+ H   (Negative)  


 


Urine RBC  5-10 H   (0-5)  /hpf


 


Urine WBC  20-30 H   (0-5)  /hpf


 


Urine WBC Clumps  Rare   (NOT SEEN)  /hpf


 


Ur Squamous Epith Cells  0-5   (0-5)  /hpf


 


Urine Bacteria  Moderate H   (FEW)  /hpf


 


Urine Mucus  Few   (FEW)  /hpf











Result Diagrams: 


                                 10/05/21 15:00





                                 10/04/21 15:10





Sepsis Event Note





- Focused Exam


Vital Signs: 


                                   Vital Signs











  Pulse Ox


 


 10/05/21 08:38  90 L


 


 10/05/21 04:52  91 L


 


 10/05/21 02:03  91 L


 


 10/05/21 00:39  97


 


 10/04/21 23:51  96














- Problem List


(1) History of COVID-19


SNOMED Code(s): 290293460043928814, 002809106888039836


   ICD Code: Z86.16 - PERSONAL HISTORY OF COVID-19   Status: Chronic   Priority:

 Medium   Current Visit: Yes   





(2) Elevated d-dimer


SNOMED Code(s): 639675895


   ICD Code: R79.89 - OTHER SPECIFIED ABNORMAL FINDINGS OF BLOOD CHEMISTRY   

Status: Acute   Priority: Medium   Current Visit: Yes   





(3) Elevated troponin


SNOMED Code(s): 459996740, 217741314, 312683114


   ICD Code: R77.8 - OTHER SPECIFIED ABNORMALITIES OF PLASMA PROTEINS   Status: 

Acute   Priority: High   Current Visit: Yes   





(4) Pneumonia


SNOMED Code(s): 894729460


   ICD Code: J18.9 - PNEUMONIA, UNSPECIFIED ORGANISM   Status: Suspected   

Priority: High   Current Visit: Yes   


Qualifiers: 


   Pneumonia type: due to unspecified organism   Laterality: unspecified 

laterality   Lung location: unspecified part of lung   Qualified Code(s): J18.9 

- Pneumonia, unspecified organism   





(5) Dyspnea


SNOMED Code(s): 142801548


   ICD Code: R06.00 - DYSPNEA, UNSPECIFIED   Status: Acute   Priority: High   

Current Visit: Yes   


Qualifiers: 


   Dyspnea type: unspecified   Qualified Code(s): R06.00 - Dyspnea, unspecified 

  





(6) Hypoxia


SNOMED Code(s): 050963717


   ICD Code: R09.02 - HYPOXEMIA   Status: Acute   Priority: High   Current Visi

t: Yes   





(7) UTI (urinary tract infection)


SNOMED Code(s): 53868797


   ICD Code: N39.0 - URINARY TRACT INFECTION, SITE NOT SPECIFIED   Status: Acute

   Priority: High   Current Visit: Yes   


Qualifiers: 


   Urinary tract infection type: site unspecified   Hematuria presence: without 

hematuria   Qualified Code(s): N39.0 - Urinary tract infection, site not 

specified   





(8) Elevated brain natriuretic peptide (BNP) level


SNOMED Code(s): 819148625, 832930188


   ICD Code: R79.89 - OTHER SPECIFIED ABNORMAL FINDINGS OF BLOOD CHEMISTRY   

Status: Acute   Priority: High   Current Visit: Yes   





(9) On home oxygen therapy


SNOMED Code(s): 058718261060


   ICD Code: Z99.81 - DEPENDENCE ON SUPPLEMENTAL OXYGEN   Status: Chronic   

Priority: Medium   Current Visit: Yes   





(10) GERD (gastroesophageal reflux disease)


SNOMED Code(s): 061322974


   ICD Code: K21.9 - GASTRO-ESOPHAGEAL REFLUX DISEASE WITHOUT ESOPHAGITIS   

Status: Chronic   Priority: Low   Current Visit: No   


Qualifiers: 


   Esophagitis presence: esophagitis presence not specified   Qualified Code(s):

 K21.9 - Gastro-esophageal reflux disease without esophagitis   





(11) Meniere disease


SNOMED Code(s): 50549225


   ICD Code: H81.09 - MENIERE'S DISEASE, UNSPECIFIED EAR   Status: Chronic   

Priority: Low   Current Visit: No   


Qualifiers: 


   Laterality: unspecified laterality   Qualified Code(s): H81.09 - Meniere's 

disease, unspecified ear   





(12) DVT (deep venous thrombosis)


SNOMED Code(s): 736985371


   ICD Code: I82.409 - ACUTE EMBOLISM AND THOMBOS UNSP DEEP VN UNSP LOWER 

EXTREMITY   Status: Acute   Priority: High   Current Visit: Yes   


Qualifiers: 


   DVT location: lower extremity   Affected thrombotic vein of extremity: 

unspecified vein of extremity   Chronicity: acute   Laterality: left   Qualified

 Code(s): I82.402 - Acute embolism and thrombosis of unspecified deep veins of 

left lower extremity   





(13) Hypotension


SNOMED Code(s): 95485669


   ICD Code: I95.9 - HYPOTENSION, UNSPECIFIED   Status: Resolved   Priority: 

High   Current Visit: Yes   


Qualifiers: 


   Hypotension type: unspecified hypotension type   Qualified Code(s): I95.9 - 

Hypotension, unspecified   


Problem List Initiated/Reviewed/Updated: Yes


Orders Last 24hrs: 


                               Active Orders 24 hr











 Category Date Time Status


 


 Admission Status [Patient Status] [ADT] Routine ADT  10/05/21 11:05 Active


 


 Cardiac Monitoring [RC] CONTINUOUS Care  10/05/21 11:19 Ordered


 


 Height and Weight [RC] DAILY Care  10/05/21 11:19 Ordered


 


 Intake and Output [RC] DAILY Care  10/05/21 11:19 Ordered


 


 Nurse Communication: Isolation [RC] ASDIRECTED Care  10/05/21 11:21 Ordered


 


 Oxygen Therapy [RC] ASDIRECTED Care  10/05/21 11:19 Active


 


 Pulse Oximetry [RC] PRN Care  10/05/21 11:19 Ordered


 


 RT Aerosol Therapy [RC] ASDIRECTED Care  10/04/21 15:19 Active


 


 RT Aerosol Therapy [RC] ASDIRECTED Care  10/05/21 08:21 Active


 


 RT Aerosol Therapy [RC] ASDIRECTED Care  10/05/21 11:20 Ordered


 


 RT Chest Physiotherapy [RC] ASDIRECTED Care  10/05/21 11:19 Ordered


 


 RT Incentive Spirometry [RC] ASDIRECTED Care  10/05/21 11:19 Ordered


 


 Up With Assistance [RC] ASDIRECTED Care  10/05/21 11:19 Ordered


 


 Vital Signs [RC] Q6H Care  10/05/21 11:19 Ordered


 


 OT Evaluation and Treatment [CONS] Routine Cons  10/05/21 11:21 Ordered


 


 PT Evaluation and Treatment [CONS] Routine Cons  10/05/21 11:21 Ordered


 


 Respiratory Care Assess and Treatment [CONS] Routine Cons  10/05/21 11:21 

Ordered


 


 Regular Diet [DIET] Diet  10/05/21 Lunch Ordered


 


 Chest 1V Frontal [CR] Stat Exams  10/04/21 15:18 Taken


 


 Echo 2D wo Cont [US] Routine Exams  10/05/21 11:19 Ordered


 


 Venous Doppler Lwr Ext Bi [US] Routine Exams  10/05/21 11:16 Ordered


 


 C-REACTIVE PROTEIN [CHEM] AM Lab  10/06/21 05:11 Ordered


 


 C-REACTIVE PROTEIN [CHEM] AM Lab  10/07/21 05:11 Ordered


 


 C-REACTIVE PROTEIN [CHEM] AM Lab  10/08/21 05:11 Ordered


 


 C-REACTIVE PROTEIN [CHEM] AM Lab  10/09/21 05:11 Ordered


 


 CBC WITH AUTO DIFF [HEME] AM Lab  10/06/21 05:11 Ordered


 


 CBC WITH AUTO DIFF [HEME] AM Lab  10/07/21 05:11 Ordered


 


 CBC WITH AUTO DIFF [HEME] AM Lab  10/08/21 05:11 Ordered


 


 CBC WITH AUTO DIFF [HEME] AM Lab  10/09/21 05:11 Ordered


 


 COMPREHENSIVE METABOLIC PN,CMP [CHEM] AM Lab  10/06/21 05:11 Ordered


 


 COMPREHENSIVE METABOLIC PN,CMP [CHEM] AM Lab  10/07/21 05:11 Ordered


 


 COMPREHENSIVE METABOLIC PN,CMP [CHEM] AM Lab  10/08/21 05:11 Ordered


 


 COMPREHENSIVE METABOLIC PN,CMP [CHEM] AM Lab  10/09/21 05:11 Ordered


 


 CULTURE URINE [MREF] Stat Lab  10/04/21 23:30 Received


 


 MAGNESIUM [CHEM] AM Lab  10/06/21 05:11 Ordered


 


 MAGNESIUM [CHEM] AM Lab  10/07/21 05:11 Ordered


 


 MAGNESIUM [CHEM] AM Lab  10/08/21 05:11 Ordered


 


 MAGNESIUM [CHEM] AM Lab  10/09/21 05:11 Ordered


 


 MYCOPLASMA PNEUMONIAE IGM AB [CHEM] Routine Lab  10/05/21 11:26 Ordered


 


 PROCALCITONIN [REF] Stat Lab  10/05/21 09:36 Received


 


 RESPIRATORY PANEL Routine Lab  10/05/21 11:26 Ordered


 


 STREP PNEUMONIAE ANTIGEN [MREF] Routine Lab  10/05/21 11:27 Ordered


 


 TROPONIN I [CHEM] AM Lab  10/06/21 05:11 Ordered


 


 Albuterol [Proventil Neb Soln] Med  10/05/21 11:19 Ordered





 2.5 mg NEB Q2H PRN   


 


 Albuterol/Ipratropium [DuoNeb 3.0-0.5 MG/3 ML] Med  10/05/21 16:00 Ordered





 3 ml NEB QIDRT   


 


 Docusate Sodium [Colace] Med  10/05/21 11:19 Ordered





 100 mg PO Q12H PRN   


 


 Enoxaparin [Lovenox] Med  10/05/21 09:00 Ordered





 40 mg SUBCUT DAILY   


 


 Ondansetron [Zofran] Med  10/05/21 11:19 Ordered





 4 mg IV Q6H PRN   


 


 Pharmacy to Dose - Vancomycin Med  10/05/21 11:30 Ordered





 1 dose .XX ASDIRECTED   


 


 Piperacillin/Tazobactam 4.5 GM - First Dose Med  10/05/21 11:17 Ordered





 Piperacillin/Tazobactam [Piperacil-Tazobact] 4.5 gm   





 Sodium Chloride 0.9% [Normal Saline] 100 ml   





 IV ONETIME   


 


 Sodium Chloride 0.9% [Normal Saline] 1,000 ml Med  10/04/21 22:15 Active





 IV ASDIRECTED   


 


 Sodium Chloride 0.9% [Normal Saline] 100 ml Med  10/04/21 18:15 Active





 IV ASDIRECTED   


 


 Sodium Chloride 0.9% [Saline Flush] Med  10/04/21 15:18 Active





 10 ml FLUSH ASDIRECTED PRN   


 


 Vancomycin [Vancocin] 1 gm Med  10/05/21 12:00 Active





 Sodium Chloride 0.9% [Normal Saline (AdvBag)] 250 ml   





 IV Q18H   


 


 Isolation [COMM] Routine Oth  10/05/21 11:19 Ordered


 


 Isolation [COMM] Routine Oth  10/05/21 11:26 Ordered


 


 RT Oxygen High Flow [RESPCARE] Routine Oth  10/05/21 11:28 Ordered


 


 Saline Lock Insert [OM.PC] Stat Oth  10/04/21 15:18 Ordered


 


 Code Status [Resuscitation Status] Stat Resus Stat  10/04/21 23:22 Ordered








                                Medication Orders





Albuterol (Albuterol 0.083% 2.5 Mg/3 Ml Neb Soln)  2.5 mg NEB Q2H PRN


   PRN Reason: Shortness Of Breath/wheezing


Albuterol/Ipratropium (Albuterol/Ipratropium 3.0-0.5 Mg/3 Ml Neb Soln)  3 ml NEB

QIDRT WILLEM


Docusate Sodium (Docusate Sodium 100 Mg Cap)  100 mg PO Q12H PRN


   PRN Reason: Constipation


Enoxaparin Sodium (Enoxaparin 40 Mg/0.4 Ml Syringe)  40 mg SUBCUT DAILY WILLEM


Sodium Chloride (Normal Saline)  100 mls @ 60 mls/min IV ASDIRECTED WILLEM


   Last Admin: 10/04/21 18:12  Dose: 60 mls/min


   Documented by: JOSE ANGEL


Sodium Chloride (Normal Saline)  1,000 mls @ 250 mls/hr IV ASDIRECTED WILLEM


   Last Admin: 10/04/21 22:14  Dose: 250 mls/hr


   Documented by: GABE


Piperacillin Sod/Tazobactam (Sod 4.5 gm/ Sodium Chloride)  100 mls @ 200 mls/hr 

IV ONETIME ONE


   Stop: 10/05/21 11:46


Vancomycin HCl 1 gm/ Sodium (Chloride)  250 mls @ 250 mls/hr IV Q18H [facility]


Ondansetron HCl (Ondansetron 4 Mg/2 Ml Sdv)  4 mg IV Q6H PRN


   PRN Reason: Nausea/Vomiting


Sodium Chloride (Sodium Chloride 0.9% 10 Ml Syringe)  10 ml FLUSH ASDIRECTED PRN


   PRN Reason: Keep Vein Open


   Last Admin: 10/04/21 18:12  Dose: 10 ml


   Documented by: JOSE ANGEL


   Admin: 10/04/21 15:30  Dose: 10 ml


   Documented by: CRYSTAL


Vancomycin HCl (Pharmacy To Dose - Vancomycin)  1 dose .XX ASDIRECTED [facility]








Assessment/Plan Comment:: 


Admission assessment - 10/5/2021


* 81-year-old female who presents to ED on 10/4/2021 with low oxygen saturations


* History of Mnire's disease, GERD, and prior Covid pneumonia


* Was hospitalized from 9/13/2021 through 9/27/2021 with COVID-19 pneumonia at 

  [facility] in [place]. 


* Discharged home on 4 L of oxygen with activity after completing 4 days of 

  remdesivir and 10 days of Decadron. 


* Of note patient was noted to have episodes of bradycardia and hypotension with

  heart rate in the 30s and 40s. She was noticed to have episodes of Mobitz type

  I AV block and 2.5-second sinus pauses. It was believed this was due to 

  remdesivir however this continued after stopping. Patient was to follow-up 

  with EP and have a 2-week cardiac monitor after discharge. 


* Prior to presenting to the ED she was noted to have saturations in the low 

  50s. 


* Patient's  increased her oxygen to 8 L via nasal cannula. 


* Denies any recent fever, chills, nausea, vomiting, diarrhea, urinary symptoms,

  or smoking history. 


* Of note patient did have acute cystitis with an E. coli UTI well in the 

  hospital in [place] and completed 5 days of Rocephin there. 


* 12-lead EKG was obtained showing a sinus tachycardia at 109 bpm with a LAFB 

  and very mild ST elevation in V2 and V3. 


* Placed on a nonrebreather mask which improved her saturations to 90 to 91%. 


* Noted to be dyspneic and only able to complete a few word sentences. 


* Labs are obtained:


   * WBC of 8.57. 


   * Hemoglobin 13.0. 


   * Hematocrit 30.5. 


   * Platelet 250,000. 


   * Neutrophils are elevated 75.7%. 


   * D-dimer is very high at 8.16. 


   * Sodium is low at 129. 


   * Potassium 3.8. 


   * Chloride 94. 


   * Carbon dioxide 24. 


   * Anion gap is 14.8. 


   * BUN is 15. Creatinine 0.6. GFR greater than 60. 


   * Glucose is 126. 


   * Calcium 8.7. 


   * Magnesium 1.8. 


   * Total bilirubin 0.6. 


   * AST is 36, ALT 31, alkaline phosphatase 86. 


   * Troponin is elevated at 0.127-->0.156-->0.067. 


   * CRP is very high at 29.3. 


   * Protein 7.1. 


   * Albumin 2.1. 


   * proBNP is 3117


   * UA is obtained and is mildly positive with slightly cloudy urine, 1+ pr

     otein, 3+ ketones, 2+ occult blood, 1+ leukocyte esterase, 5-10 RBCs, 20-30

     WBCs, rare WBC clumps, and moderate bacteria.


* ABG obtained in the left radial with a pH of 7.44. PCO2 of 30.9. PO2 of 58.0. 

  HCO3 of 20.5. O2 saturations 86%. Base excess is -2.3. Aa gradient is 15.0. 

  This is obtained while on nonrebreather at 15 L. 


* Facility was out of CPAP and BiPAP-> started on high flow O2 with saturations 

  in the low 90s. 


* CTA of the chest is obtained to rule out PE and shows:


   * 1. No findings of pulmonary embolism. 


   * 2. Minimal left-sided pleural effusion. 


   * 3. Diffuse emphysematous changes seen. Both lungs show diffuse increased 

     density uncertain how much of this represents diffuse fibrosis versus 

     possible superimposed pneumonia. Old comparison studies would be needed if 

     available. 


   * 4. 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 


   * 5. Other findings which are felt to be chronic as described above. 


* Chest x-ray shows scattered infiltrates bilaterally most prominent in the left

  lower lobe. 


* Plan was to transfer the patient for continued care due to elevated troponin 

  however [place] beds [place] be available [place] or 

  [place]. 


* Noted to have a blood pressure of 80/61 and is given a 500 mill fluid bolus 

  over 2 hours. 


* Given 1 dose of Rocephin in the ED. She is also given 125 mg Solu-Medrol and a

  DuoNeb, which she reports greatly helped.


* Ultimately inpatient bed does open up at our facility and she is admitted to 

  the floor on telemetry for management of her hypoxia, suspected pneumonia, 

  elevated BNP, elevated troponin - likely demand ischemia, and questionable 

  UTI. 


* On floor bilateral lower extremity ultrasound shows thrombus within the left 

  mid and distal superficial, femoral, popliteal, posterior tibial, and peroneal

  veins.





PLAN:


Pneumonia - suspected


Dyspnea


Hypoxia


History of COVID-19


On home oxygen therapy


* Droplet isolation (airborne/contact while on high flow O2)


* O2 as needed to keep saturation greater than 90%


* I-S/Acapella


* Given recent hospitalization we will start every 8 hours Zosyn and vancomycin 

  with pharmacy to dose


* Check procalcitonin


* Repeat CBC, CMP, magnesium, CRP, and check lactic acid


* High flow oxygen as directed


* Check mycoplasma, strep pneumonia, respiratory viral panel


* Sputum culture if able to produce


* 4 times daily scheduled DuoNebs


* Every 2 hours as needed albuterol nebulizer


* Consult RT


* Telemetry


* Obtain blood cultures


* Continuous pulse oximetry


* Mucinex BID


* Recommend PFT after recovery due to emphysematous changes noted in CTA





DVT


Elevated d-dimer


* CTA in ED negative for PE


* Start Eliquis 10mg BID for 7 days tomorrow then transition to 5mg BID


* Patient given 40mg Lovenox earlier in day- increase dosing tonight to equal 

  1mg/kg x1 dose and then discontinue. 





Hypotension - resolved


* Noted in ED and multiple fluid boluses given


* Monitor 





Elevated troponin


Elevated brain natriuretic peptide (BNP) level


* Trending downward


* Obtain echocardiogram -rule out cardiomyopathy


* Recheck troponin in a.m.


* Monitor patient's weight


* Monitor I&O's


* Telemetry





UTI (urinary tract infection)


* ?  Chronic bacteriuria as patient is reportedly asymptomatic.


* UA weakly positivepatient was treated for E. coli UTI with Rocephin in 

  [place]


* Urine culture pending


* Obtain blood cultures


* Zosyn and vancomycin as above





GERD (gastroesophageal reflux disease)


* No acute concerns


* Monitor 





Meniere disease


* Acute concerns


* Monitor





Code status: DNR/DNI


PCP: Dr. Monroe in [place]


DVT prophylaxis: Lovenox


Disposition: Patient mated to the floor for management and further work-up of 

suspected pneumonia, hypoxia, elevated troponin, elevated D-dimer, and weak UTI.

 Anticipated length of stay 3 to 4 days.








- Mortality Measure


Prognosis:: Poor (Overall poor prognosis given significant baseline lung disease

secondary to Covid and oxygen dependency.)





<Renay Eid - Last Filed: 10/05/21 18:43>





H&P History of Present Illness





- General


Admit Problem/Dx: 


                           Admission Diagnosis/Problem





Admission Diagnosis/Problem      Hypoxia











Exam





- Vital Signs


Vital Signs: 


                                Last Vital Signs











Temp  97.7 F   10/05/21 17:04


 


Pulse  108 H  10/05/21 17:04


 


Resp  24 H  10/05/21 17:04


 


BP  122/67   10/05/21 17:04


 


Pulse Ox  91 L  10/05/21 17:08














- Patient Data


Lab Results Last 24 hrs: 


                         Laboratory Results - last 24 hr











  10/04/21 10/04/21 10/05/21 Range/Units





  19:10 23:30 07:56 


 


WBC     (3.98-10.04)  K/mm3


 


RBC     (3.98-5.22)  M/mm3


 


Hgb     (11.2-15.7)  gm/dl


 


Hct     (34.1-44.9)  %


 


MCV     (79.4-94.8)  fl


 


MCH     (25.6-32.2)  pg


 


MCHC     (32.2-35.5)  g/dl


 


RDW Std Deviation     (36.4-46.3)  fL


 


Plt Count     (182-369)  K/mm3


 


MPV     (9.4-12.3)  fl


 


Neut % (Auto)     (34.0-71.1)  %


 


Lymph % (Auto)     (19.3-51.7)  %


 


Mono % (Auto)     (4.7-12.5)  %


 


Eos % (Auto)     (0.7-5.8)  


 


Baso % (Auto)     (0.1-1.2)  %


 


Neut # (Auto)     (1.56-6.13)  K/mm3


 


Lymph # (Auto)     (1.18-3.74)  K/mm3


 


Mono # (Auto)     (0.24-0.36)  K/mm3


 


Eos # (Auto)     (0.04-0.36)  K/mm3


 


Baso # (Auto)     (0.01-0.08)  K/mm3


 


Sodium     (136-145)  mEq/L


 


Potassium     (3.5-5.1)  mEq/L


 


Chloride     ()  mEq/L


 


Carbon Dioxide     (21-32)  mEq/L


 


Anion Gap     (5-15)  


 


BUN     (7-18)  mg/dL


 


Creatinine     (0.55-1.02)  mg/dL


 


Est Cr Clr Drug Dosing     mL/min


 


Estimated GFR (MDRD)     (>60)  mL/min


 


BUN/Creatinine Ratio     (14-18)  


 


Glucose     (70-99)  mg/dL


 


Lactic Acid     (0.4-2.0)  mmol/L


 


Calcium     (8.5-10.1)  mg/dL


 


Magnesium     (1.8-2.4)  mg/dL


 


Troponin I  0.156 H*   0.067 H*  (0.00-0.056)  ng/mL


 


C-Reactive Protein     (<1.0)  mg/dL


 


Urine Color   Yellow   (Yellow)  


 


Urine Appearance   Slt cloudy H   (Clear)  


 


Urine pH   6.0   (5.0-8.0)  


 


Ur Specific Gravity   1.010   (1.005-1.030)  


 


Urine Protein   1+ H   (Negative)  


 


Urine Glucose (UA)   Negative   (Negative)  


 


Urine Ketones   3+ H   (Negative)  


 


Urine Occult Blood   2+ H   (Negative)  


 


Urine Nitrite   Negative   (Negative)  


 


Urine Bilirubin   Negative   (Negative)  


 


Urine Urobilinogen   0.2   (0.2-1.0)  


 


Ur Leukocyte Esterase   1+ H   (Negative)  


 


Urine RBC   5-10 H   (0-5)  /hpf


 


Urine WBC   20-30 H   (0-5)  /hpf


 


Urine WBC Clumps   Rare   (NOT SEEN)  /hpf


 


Ur Squamous Epith Cells   0-5   (0-5)  /hpf


 


Urine Bacteria   Moderate H   (FEW)  /hpf


 


Urine Mucus   Few   (FEW)  /hpf


 


Mycoplasma pneumon IgM     (NEGATIVE)  














  10/05/21 10/05/21 10/05/21 Range/Units





  09:36 15:00 15:00 


 


WBC   6.36   (3.98-10.04)  K/mm3


 


RBC   3.78 L   (3.98-5.22)  M/mm3


 


Hgb   11.3  D   (11.2-15.7)  gm/dl


 


Hct   33.8 L   (34.1-44.9)  %


 


MCV   89.4   (79.4-94.8)  fl


 


MCH   29.9   (25.6-32.2)  pg


 


MCHC   33.4   (32.2-35.5)  g/dl


 


RDW Std Deviation   44.9   (36.4-46.3)  fL


 


Plt Count   221   (182-369)  K/mm3


 


MPV   9.7   (9.4-12.3)  fl


 


Neut % (Auto)   91.1 H   (34.0-71.1)  %


 


Lymph % (Auto)   5.5 L   (19.3-51.7)  %


 


Mono % (Auto)   2.8 L   (4.7-12.5)  %


 


Eos % (Auto)   0 L   (0.7-5.8)  


 


Baso % (Auto)   0.0 L   (0.1-1.2)  %


 


Neut # (Auto)   5.79   (1.56-6.13)  K/mm3


 


Lymph # (Auto)   0.35 L   (1.18-3.74)  K/mm3


 


Mono # (Auto)   0.18 L   (0.24-0.36)  K/mm3


 


Eos # (Auto)   0.00 L   (0.04-0.36)  K/mm3


 


Baso # (Auto)   0.00 L   (0.01-0.08)  K/mm3


 


Sodium    135 L  (136-145)  mEq/L


 


Potassium    3.5  (3.5-5.1)  mEq/L


 


Chloride    100  ()  mEq/L


 


Carbon Dioxide    21  (21-32)  mEq/L


 


Anion Gap    17.5 H  (5-15)  


 


BUN    16  (7-18)  mg/dL


 


Creatinine    0.5 L  (0.55-1.02)  mg/dL


 


Est Cr Clr Drug Dosing    68.84  mL/min


 


Estimated GFR (MDRD)    > 60  (>60)  mL/min


 


BUN/Creatinine Ratio    32.0 H  (14-18)  


 


Glucose    146 H  (70-99)  mg/dL


 


Lactic Acid     (0.4-2.0)  mmol/L


 


Calcium    8.5  (8.5-10.1)  mg/dL


 


Magnesium    2.0  (1.8-2.4)  mg/dL


 


Troponin I     (0.00-0.056)  ng/mL


 


C-Reactive Protein    31.5 H*  (<1.0)  mg/dL


 


Urine Color     (Yellow)  


 


Urine Appearance     (Clear)  


 


Urine pH     (5.0-8.0)  


 


Ur Specific Gravity     (1.005-1.030)  


 


Urine Protein     (Negative)  


 


Urine Glucose (UA)     (Negative)  


 


Urine Ketones     (Negative)  


 


Urine Occult Blood     (Negative)  


 


Urine Nitrite     (Negative)  


 


Urine Bilirubin     (Negative)  


 


Urine Urobilinogen     (0.2-1.0)  


 


Ur Leukocyte Esterase     (Negative)  


 


Urine RBC     (0-5)  /hpf


 


Urine WBC     (0-5)  /hpf


 


Urine WBC Clumps     (NOT SEEN)  /hpf


 


Ur Squamous Epith Cells     (0-5)  /hpf


 


Urine Bacteria     (FEW)  /hpf


 


Urine Mucus     (FEW)  /hpf


 


Mycoplasma pneumon IgM  Negative    (NEGATIVE)  














  10/05/21 Range/Units





  15:00 


 


WBC   (3.98-10.04)  K/mm3


 


RBC   (3.98-5.22)  M/mm3


 


Hgb   (11.2-15.7)  gm/dl


 


Hct   (34.1-44.9)  %


 


MCV   (79.4-94.8)  fl


 


MCH   (25.6-32.2)  pg


 


MCHC   (32.2-35.5)  g/dl


 


RDW Std Deviation   (36.4-46.3)  fL


 


Plt Count   (182-369)  K/mm3


 


MPV   (9.4-12.3)  fl


 


Neut % (Auto)   (34.0-71.1)  %


 


Lymph % (Auto)   (19.3-51.7)  %


 


Mono % (Auto)   (4.7-12.5)  %


 


Eos % (Auto)   (0.7-5.8)  


 


Baso % (Auto)   (0.1-1.2)  %


 


Neut # (Auto)   (1.56-6.13)  K/mm3


 


Lymph # (Auto)   (1.18-3.74)  K/mm3


 


Mono # (Auto)   (0.24-0.36)  K/mm3


 


Eos # (Auto)   (0.04-0.36)  K/mm3


 


Baso # (Auto)   (0.01-0.08)  K/mm3


 


Sodium   (136-145)  mEq/L


 


Potassium   (3.5-5.1)  mEq/L


 


Chloride   ()  mEq/L


 


Carbon Dioxide   (21-32)  mEq/L


 


Anion Gap   (5-15)  


 


BUN   (7-18)  mg/dL


 


Creatinine   (0.55-1.02)  mg/dL


 


Est Cr Clr Drug Dosing   mL/min


 


Estimated GFR (MDRD)   (>60)  mL/min


 


BUN/Creatinine Ratio   (14-18)  


 


Glucose   (70-99)  mg/dL


 


Lactic Acid  1.0  (0.4-2.0)  mmol/L


 


Calcium   (8.5-10.1)  mg/dL


 


Magnesium   (1.8-2.4)  mg/dL


 


Troponin I   (0.00-0.056)  ng/mL


 


C-Reactive Protein   (<1.0)  mg/dL


 


Urine Color   (Yellow)  


 


Urine Appearance   (Clear)  


 


Urine pH   (5.0-8.0)  


 


Ur Specific Gravity   (1.005-1.030)  


 


Urine Protein   (Negative)  


 


Urine Glucose (UA)   (Negative)  


 


Urine Ketones   (Negative)  


 


Urine Occult Blood   (Negative)  


 


Urine Nitrite   (Negative)  


 


Urine Bilirubin   (Negative)  


 


Urine Urobilinogen   (0.2-1.0)  


 


Ur Leukocyte Esterase   (Negative)  


 


Urine RBC   (0-5)  /hpf


 


Urine WBC   (0-5)  /hpf


 


Urine WBC Clumps   (NOT SEEN)  /hpf


 


Ur Squamous Epith Cells   (0-5)  /hpf


 


Urine Bacteria   (FEW)  /hpf


 


Urine Mucus   (FEW)  /hpf


 


Mycoplasma pneumon IgM   (NEGATIVE)  











Result Diagrams: 


                                 10/05/21 15:00





                                 10/05/21 15:00





Sepsis Event Note





- Focused Exam


Vital Signs: 


                                   Vital Signs











  Temp Pulse Resp BP Pulse Ox Pulse Ox


 


 10/05/21 17:08      91 L 


 


 10/05/21 17:04  97.7 F  108 H  24 H  122/67  88 L 


 


 10/05/21 12:10   116 H    88 L 


 


 10/05/21 12:09   114 H   157/107 H  88 L 


 


 10/05/21 12:07   118 H  32 H  160/105 H  86 L 


 


 10/05/21 11:14       87 L


 


 10/05/21 08:38       90 L











Orders Last 24hrs: 


                               Active Orders 24 hr











 Category Date Time Status


 


 Admission Status [Patient Status] [ADT] Routine ADT  10/05/21 11:05 Active


 


 Height and Weight [RC] 06 Care  10/05/21 11:19 Active


 


 Intake and Output [RC] 04,16 Care  10/05/21 11:19 Active


 


 Oxygen Therapy [RC] ASDIRECTED Care  10/05/21 11:19 Active


 


 RT Aerosol Therapy [RC] ASDIRECTED Care  10/05/21 08:21 Active


 


 RT Chest Physiotherapy [RC] ASDIRECTED Care  10/05/21 11:19 Active


 


 RT Incentive Spirometry [RC] ASDIRECTED Care  10/05/21 11:19 Active


 


 Up With Assistance [RC] ASDIRECTED Care  10/05/21 11:19 Active


 


 Vital Signs [RC] Q6HR Care  10/05/21 11:19 Active


 


 OT Evaluation and Treatment [CONS] Routine Cons  10/05/21 11:21 Active


 


 PT Evaluation and Treatment [CONS] Routine Cons  10/05/21 11:21 Active


 


 Respiratory Care Assess and Treatment [CONS] Routine Cons  10/05/21 11:21 

Active


 


 Regular Diet [DIET] Diet  10/05/21 Lunch Active


 


 BLOOD CULTURE [MREF] Stat Lab  10/05/21 16:20 Received


 


 BLOOD CULTURE [MREF] Stat Lab  10/05/21 16:30 Received


 


 C-REACTIVE PROTEIN [CHEM] AM Lab  10/06/21 05:11 Ordered


 


 C-REACTIVE PROTEIN [CHEM] AM Lab  10/07/21 05:11 Ordered


 


 C-REACTIVE PROTEIN [CHEM] AM Lab  10/08/21 05:11 Ordered


 


 C-REACTIVE PROTEIN [CHEM] AM Lab  10/09/21 05:11 Ordered


 


 CBC WITH AUTO DIFF [HEME] AM Lab  10/06/21 05:11 Ordered


 


 CBC WITH AUTO DIFF [HEME] AM Lab  10/07/21 05:11 Ordered


 


 CBC WITH AUTO DIFF [HEME] AM Lab  10/08/21 05:11 Ordered


 


 CBC WITH AUTO DIFF [HEME] AM Lab  10/09/21 05:11 Ordered


 


 COMPREHENSIVE METABOLIC PN,CMP [CHEM] AM Lab  10/06/21 05:11 Ordered


 


 COMPREHENSIVE METABOLIC PN,CMP [CHEM] AM Lab  10/07/21 05:11 Ordered


 


 COMPREHENSIVE METABOLIC PN,CMP [CHEM] AM Lab  10/08/21 05:11 Ordered


 


 COMPREHENSIVE METABOLIC PN,CMP [CHEM] AM Lab  10/09/21 05:11 Ordered


 


 CULTURE URINE [MREF] Stat Lab  10/04/21 23:30 Received


 


 MAGNESIUM [CHEM] AM Lab  10/06/21 05:11 Ordered


 


 MAGNESIUM [CHEM] AM Lab  10/07/21 05:11 Ordered


 


 MAGNESIUM [CHEM] AM Lab  10/08/21 05:11 Ordered


 


 MAGNESIUM [CHEM] AM Lab  10/09/21 05:11 Ordered


 


 PROCALCITONIN [REF] Stat Lab  10/05/21 09:36 Received


 


 RESPIRATORY PANEL Routine Lab  10/05/21 14:30 Received


 


 STREP PNEUMONIAE ANTIGEN [MREF] Routine Lab  10/05/21 11:27 Received


 


 TROPONIN I [CHEM] AM Lab  10/06/21 05:11 Ordered


 


 VANCOMYCIN TROUGH [CHEM] Timed Lab  10/07/21 20:30 Ordered


 


 Albuterol [Proventil Neb Soln] Med  10/05/21 11:19 Active





 2.5 mg NEB Q2H PRN   


 


 Albuterol/Ipratropium [DuoNeb 3.0-0.5 MG/3 ML] Med  10/05/21 16:00 Active





 3 ml NEB QIDRT   


 


 Apixaban [Eliquis] Med  10/06/21 09:00 Active





 10 mg PO BID   


 


 Docusate Sodium [Colace] Med  10/05/21 11:19 Active





 100 mg PO Q12H PRN   


 


 Ondansetron [Zofran] Med  10/05/21 11:19 Active





 4 mg IV Q6H PRN   


 


 Pharmacy to Dose - Vancomycin Med  10/05/21 11:30 Active





 1 dose .XX ASDIRECTED   


 


 Piperacillin/Tazobactam [Piperacil-Tazobact] 4.5 gm Med  10/05/21 22:30 Active





 Sodium Chloride 0.9% [Normal Saline] 100 ml   





 IV Q8H   


 


 Vancomycin [Vancocin] 1 gm Med  10/05/21 15:00 Active





 Sodium Chloride 0.9% [Normal Saline (AdvBag)] 250 ml   





 IV Q18H   


 


 guaiFENesin [Mucinex] Med  10/05/21 15:30 Active





 600 mg PO BID   


 


 Blood Culture x2 Reflex Set [OM.PC] Stat Oth  10/05/21 15:34 Ordered


 


 Isolation [COMM] Routine Oth  10/05/21 11:19 Ordered


 


 RT Oxygen High Flow [RESPCARE] Routine Oth  10/05/21 11:28 Active


 


 Code Status [Resuscitation Status] Stat Resus Stat  10/04/21 23:22 Ordered








                                Medication Orders





Albuterol (Albuterol 0.083% 2.5 Mg/3 Ml Neb Soln)  2.5 mg NEB Q2H PRN


   PRN Reason: Shortness Of Breath/wheezing


Albuterol/Ipratropium (Albuterol/Ipratropium 3.0-0.5 Mg/3 Ml Neb Soln)  3 ml NEB

QIDRT WILLEM


   Last Admin: 10/05/21 18:27 Dose:  Not Given


   Documented by: BRADEN


   Admin: 10/05/21 13:43  Dose: 3 ml


   Documented by: BRADEN


Apixaban (Apixaban 5 Mg Tab)  10 mg PO BID [facility]


   Stop: 10/12/21 21:01


Docusate Sodium (Docusate Sodium 100 Mg Cap)  100 mg PO Q12H PRN


   PRN Reason: Constipation


Guaifenesin (Guaifenesin 600 Mg Tab.Er)  600 mg PO BID [facility]


   Last Admin: 10/05/21 15:56  Dose: 600 mg


   Documented by: GILES


Piperacillin Sod/Tazobactam (Sod 4.5 gm/ Sodium Chloride)  100 mls @ 25 mls/hr 

IV Q8H [facility]


Vancomycin HCl 1 gm/ Sodium (Chloride)  250 mls @ 250 mls/hr IV Q18H [facility]


   Last Admin: 10/05/21 14:49  Dose: 250 mls/hr


   Documented by: SHEFALI


Ondansetron HCl (Ondansetron 4 Mg/2 Ml Sdv)  4 mg IV Q6H PRN


   PRN Reason: Nausea/Vomiting


Sodium Chloride (Sodium Chloride 0.9% 10 Ml Syringe)  10 ml FLUSH ASDIRECTED PRN


   PRN Reason: Keep Vein Open


   Last Admin: 10/04/21 18:12  Dose: 10 ml


   Documented by: JOSE ANGEL


   Admin: 10/04/21 15:30  Dose: 10 ml


   Documented by: CRYSTAL


Vancomycin HCl (Pharmacy To Dose - Vancomycin)  1 dose .XX ASDIRECTED [facility]








Assessment/Plan Comment:: 


Patient is appearing to be septic with tachycardia, hypoxia, UTI positive, 

probable pneumonia hospital-acquired.  We will continue fluids.  close 

monitoring.


CVS: Tachycardia, no ectopic beats


Nominal: Soft nontender nondistended


Pulmonary: Shallow breath sounds, bilateral lower lobe crackles


Ext: no edema


Agree with assessment and plan.  Will add fluids.

## 2021-10-06 RX ADMIN — POTASSIUM CHLORIDE SCH MEQ: 750 TABLET, FILM COATED, EXTENDED RELEASE ORAL at 15:40

## 2021-10-06 RX ADMIN — POTASSIUM CHLORIDE SCH MEQ: 750 TABLET, FILM COATED, EXTENDED RELEASE ORAL at 08:57

## 2021-10-06 RX ADMIN — POTASSIUM CHLORIDE SCH MEQ: 750 TABLET, FILM COATED, EXTENDED RELEASE ORAL at 21:47

## 2021-10-06 NOTE — PCM.PN
<Nelson Garcia - Last Filed: 10/06/21 14:43>





- General Info


Date of Service: 10/06/21


Admission Dx/Problem (Free Text): 


                           Admission Diagnosis/Problem





Admission Diagnosis/Problem      Hypoxia








Functional Status: Reports: Pain Controlled, Tolerating Diet, Ambulating 

(minimal due to SOB), Urinating, Incentive Spirometry.  Denies: New Symptoms





- Review of Systems


General: Reports: Weakness.  Denies: Fever, Fatigue, Malaise, Chills


HEENT: Reports: No Symptoms, Sinus Congestion.  Denies: Headaches, Sore Throat


Pulmonary: Reports: Shortness of Breath, Cough, Sputum.  Denies: Pleuritic Chest

Pain


Cardiovascular: Reports: Dyspnea on Exertion.  Denies: Chest Pain, Palpitations,

Edema


Gastrointestinal: Denies: Abdominal Pain, Constipation, Diarrhea, Nausea, 

Vomiting


Genitourinary: Reports: No Symptoms, Pain


Musculoskeletal: Reports: No Symptoms


Skin: Reports: No Symptoms.  Denies: Cyanosis


Neurological: Reports: No Symptoms, Difficulty Walking, Weakness.  Denies: 

Confusion, Dizziness, Headache, Numbness, Paresthesia, Pre-Existing Deficit, 

Seizure, Syncope, Tingling, Tremors, Trouble Speaking, Gait Disturbance


Psychiatric: Reports: No Symptoms





- Patient Data


Vitals - Most Recent: 


                                Last Vital Signs











Temp  97.5 F   10/06/21 06:01


 


Pulse  90   10/06/21 06:01


 


Resp  24 H  10/06/21 06:01


 


BP  123/81   10/06/21 06:01


 


Pulse Ox  89 L  10/06/21 06:39











Weight - Most Recent: 135 lb 9.6 oz


I&O - Last 24 Hours: 


                                 Intake & Output











 10/05/21 10/06/21 10/06/21





 22:59 06:59 14:59


 


Intake Total 650 100 


 


Output Total 650 1100 


 


Balance 0 -1000 











Lab Results Last 24 Hours: 


                         Laboratory Results - last 24 hr











  10/05/21 10/05/21 10/05/21 Range/Units





  07:56 09:36 09:36 


 


WBC     (3.98-10.04)  K/mm3


 


RBC     (3.98-5.22)  M/mm3


 


Hgb     (11.2-15.7)  gm/dl


 


Hct     (34.1-44.9)  %


 


MCV     (79.4-94.8)  fl


 


MCH     (25.6-32.2)  pg


 


MCHC     (32.2-35.5)  g/dl


 


RDW Std Deviation     (36.4-46.3)  fL


 


Plt Count     (182-369)  K/mm3


 


MPV     (9.4-12.3)  fl


 


Neut % (Auto)     (34.0-71.1)  %


 


Lymph % (Auto)     (19.3-51.7)  %


 


Mono % (Auto)     (4.7-12.5)  %


 


Eos % (Auto)     (0.7-5.8)  


 


Baso % (Auto)     (0.1-1.2)  %


 


Neut # (Auto)     (1.56-6.13)  K/mm3


 


Lymph # (Auto)     (1.18-3.74)  K/mm3


 


Mono # (Auto)     (0.24-0.36)  K/mm3


 


Eos # (Auto)     (0.04-0.36)  K/mm3


 


Baso # (Auto)     (0.01-0.08)  K/mm3


 


Sodium     (136-145)  mEq/L


 


Potassium     (3.5-5.1)  mEq/L


 


Chloride     ()  mEq/L


 


Carbon Dioxide     (21-32)  mEq/L


 


Anion Gap     (5-15)  


 


BUN     (7-18)  mg/dL


 


Creatinine     (0.55-1.02)  mg/dL


 


Est Cr Clr Drug Dosing     mL/min


 


Estimated GFR (MDRD)     (>60)  mL/min


 


BUN/Creatinine Ratio     (14-18)  


 


Glucose     (70-99)  mg/dL


 


Lactic Acid     (0.4-2.0)  mmol/L


 


Calcium     (8.5-10.1)  mg/dL


 


Magnesium     (1.8-2.4)  mg/dL


 


Total Bilirubin     (0.2-1.0)  mg/dL


 


AST     (15-37)  U/L


 


ALT     (14-59)  U/L


 


Alkaline Phosphatase     ()  U/L


 


Troponin I  0.067 H*    (0.00-0.056)  ng/mL


 


C-Reactive Protein     (<1.0)  mg/dL


 


NT-Pro-B Natriuret Pep     (0-450)  pg/mL


 


Total Protein     (6.4-8.2)  g/dl


 


Albumin     (3.4-5.0)  g/dl


 


Globulin     gm/dL


 


Albumin/Globulin Ratio     (1-2)  


 


Procalcitonin   0.29 H   ng/mL


 


Mycoplasma pneumon IgM    Negative  (NEGATIVE)  














  10/05/21 10/05/21 10/05/21 Range/Units





  15:00 15:00 15:00 


 


WBC  6.36    (3.98-10.04)  K/mm3


 


RBC  3.78 L    (3.98-5.22)  M/mm3


 


Hgb  11.3  D    (11.2-15.7)  gm/dl


 


Hct  33.8 L    (34.1-44.9)  %


 


MCV  89.4    (79.4-94.8)  fl


 


MCH  29.9    (25.6-32.2)  pg


 


MCHC  33.4    (32.2-35.5)  g/dl


 


RDW Std Deviation  44.9    (36.4-46.3)  fL


 


Plt Count  221    (182-369)  K/mm3


 


MPV  9.7    (9.4-12.3)  fl


 


Neut % (Auto)  91.1 H    (34.0-71.1)  %


 


Lymph % (Auto)  5.5 L    (19.3-51.7)  %


 


Mono % (Auto)  2.8 L    (4.7-12.5)  %


 


Eos % (Auto)  0 L    (0.7-5.8)  


 


Baso % (Auto)  0.0 L    (0.1-1.2)  %


 


Neut # (Auto)  5.79    (1.56-6.13)  K/mm3


 


Lymph # (Auto)  0.35 L    (1.18-3.74)  K/mm3


 


Mono # (Auto)  0.18 L    (0.24-0.36)  K/mm3


 


Eos # (Auto)  0.00 L    (0.04-0.36)  K/mm3


 


Baso # (Auto)  0.00 L    (0.01-0.08)  K/mm3


 


Sodium   135 L   (136-145)  mEq/L


 


Potassium   3.5   (3.5-5.1)  mEq/L


 


Chloride   100   ()  mEq/L


 


Carbon Dioxide   21   (21-32)  mEq/L


 


Anion Gap   17.5 H   (5-15)  


 


BUN   16   (7-18)  mg/dL


 


Creatinine   0.5 L   (0.55-1.02)  mg/dL


 


Est Cr Clr Drug Dosing   68.84   mL/min


 


Estimated GFR (MDRD)   > 60   (>60)  mL/min


 


BUN/Creatinine Ratio   32.0 H   (14-18)  


 


Glucose   146 H   (70-99)  mg/dL


 


Lactic Acid    1.0  (0.4-2.0)  mmol/L


 


Calcium   8.5   (8.5-10.1)  mg/dL


 


Magnesium   2.0   (1.8-2.4)  mg/dL


 


Total Bilirubin     (0.2-1.0)  mg/dL


 


AST     (15-37)  U/L


 


ALT     (14-59)  U/L


 


Alkaline Phosphatase     ()  U/L


 


Troponin I     (0.00-0.056)  ng/mL


 


C-Reactive Protein   31.5 H*   (<1.0)  mg/dL


 


NT-Pro-B Natriuret Pep     (0-450)  pg/mL


 


Total Protein     (6.4-8.2)  g/dl


 


Albumin     (3.4-5.0)  g/dl


 


Globulin     gm/dL


 


Albumin/Globulin Ratio     (1-2)  


 


Procalcitonin     ng/mL


 


Mycoplasma pneumon IgM     (NEGATIVE)  














  10/05/21 10/06/21 10/06/21 Range/Units





  19:15 04:44 04:44 


 


WBC   7.23   (3.98-10.04)  K/mm3


 


RBC   3.73 L   (3.98-5.22)  M/mm3


 


Hgb   11.3   (11.2-15.7)  gm/dl


 


Hct   33.4 L   (34.1-44.9)  %


 


MCV   89.5   (79.4-94.8)  fl


 


MCH   30.3   (25.6-32.2)  pg


 


MCHC   33.8   (32.2-35.5)  g/dl


 


RDW Std Deviation   44.8   (36.4-46.3)  fL


 


Plt Count   261   (182-369)  K/mm3


 


MPV   10.5   (9.4-12.3)  fl


 


Neut % (Auto)   83.6 H   (34.0-71.1)  %


 


Lymph % (Auto)   8.9 L   (19.3-51.7)  %


 


Mono % (Auto)   7.1   (4.7-12.5)  %


 


Eos % (Auto)   0 L   (0.7-5.8)  


 


Baso % (Auto)   0.1   (0.1-1.2)  %


 


Neut # (Auto)   6.05   (1.56-6.13)  K/mm3


 


Lymph # (Auto)   0.64 L   (1.18-3.74)  K/mm3


 


Mono # (Auto)   0.51 H   (0.24-0.36)  K/mm3


 


Eos # (Auto)   0.00 L   (0.04-0.36)  K/mm3


 


Baso # (Auto)   0.01   (0.01-0.08)  K/mm3


 


Sodium    137  (136-145)  mEq/L


 


Potassium    3.3 L  (3.5-5.1)  mEq/L


 


Chloride    103  ()  mEq/L


 


Carbon Dioxide    24  (21-32)  mEq/L


 


Anion Gap    13.3  (5-15)  


 


BUN    19 H  (7-18)  mg/dL


 


Creatinine    0.6  (0.55-1.02)  mg/dL


 


Est Cr Clr Drug Dosing    57.37  mL/min


 


Estimated GFR (MDRD)    > 60  (>60)  mL/min


 


BUN/Creatinine Ratio    31.7 H  (14-18)  


 


Glucose    146 H  (70-99)  mg/dL


 


Lactic Acid     (0.4-2.0)  mmol/L


 


Calcium    8.4 L  (8.5-10.1)  mg/dL


 


Magnesium    1.8  (1.8-2.4)  mg/dL


 


Total Bilirubin    0.5  (0.2-1.0)  mg/dL


 


AST    26  (15-37)  U/L


 


ALT    26  (14-59)  U/L


 


Alkaline Phosphatase    90  ()  U/L


 


Troponin I    0.031  (0.00-0.056)  ng/mL


 


C-Reactive Protein    24.2 H*  (<1.0)  mg/dL


 


NT-Pro-B Natriuret Pep  4148 H    (0-450)  pg/mL


 


Total Protein    6.5  (6.4-8.2)  g/dl


 


Albumin    1.8 L  (3.4-5.0)  g/dl


 


Globulin    4.7  gm/dL


 


Albumin/Globulin Ratio    0.4 L  (1-2)  


 


Procalcitonin     ng/mL


 


Mycoplasma pneumon IgM     (NEGATIVE)  











Med Orders - Current: 


                               Current Medications





Albuterol (Albuterol 0.083% 2.5 Mg/3 Ml Neb Soln)  2.5 mg NEB Q2H PRN


   PRN Reason: Shortness Of Breath/wheezing


Albuterol/Ipratropium (Albuterol/Ipratropium 3.0-0.5 Mg/3 Ml Neb Soln)  3 ml NEB

QIDRT [facility]


   Last Admin: 10/06/21 06:19 Dose:  3 ml


   Documented by: 


Apixaban (Apixaban 5 Mg Tab)  10 mg PO BID [facility]


   Stop: 10/12/21 21:01


Docusate Sodium (Docusate Sodium 100 Mg Cap)  100 mg PO Q12H PRN


   PRN Reason: Constipation


Furosemide (Furosemide 20 Mg/2 Ml Vial)  20 mg IVPUSH TIDMEALS [facility]


   Last Admin: 10/06/21 06:11 Dose:  20 mg


   Documented by: 


Guaifenesin (Guaifenesin 600 Mg Tab.Er)  600 mg PO BID [facility]


   Last Admin: 10/05/21 21:33 Dose:  600 mg


   Documented by: 


Piperacillin Sod/Tazobactam (Sod 4.5 gm/ Sodium Chloride)  100 mls @ 25 mls/hr 

IV Q8H [facility]


   Last Admin: 10/06/21 06:11 Dose:  25 mls/hr


   Documented by: 


Vancomycin HCl 1 gm/ Sodium (Chloride)  250 mls @ 250 mls/hr IV Q18H [facility]


   Last Admin: 10/05/21 14:49 Dose:  250 mls/hr


   Documented by: 


Ondansetron HCl (Ondansetron 4 Mg/2 Ml Sdv)  4 mg IV Q6H PRN


   PRN Reason: Nausea/Vomiting


Potassium Chloride (Potassium Chloride 10 Meq Tab.Er)  10 meq PO TID [facility]


   Last Admin: 10/05/21 21:33 Dose:  10 meq


   Documented by: 


Potassium Chloride (Potassium Chloride 20 Meq Tab.Er)  20 meq PO ONETIME ONE


   Stop: 10/06/21 07:21


Sodium Chloride (Sodium Chloride 0.9% 10 Ml Syringe)  10 ml FLUSH ASDIRECTED PRN


   PRN Reason: Keep Vein Open


   Last Admin: 10/04/21 18:12 Dose:  10 ml


   Documented by: 


Vancomycin HCl (Pharmacy To Dose - Vancomycin)  1 dose .XX ASDIRECTED [facility]





Discontinued Medications





Albuterol (Albuterol 0.083% 2.5 Mg/3 Ml Neb Soln)  2.5 mg NEB ONETIME ONE


   Stop: 10/04/21 15:19


   Last Admin: 10/04/21 15:47 Dose:  2.5 mg


   Documented by: 


Albuterol/Ipratropium (Albuterol/Ipratropium 3.0-0.5 Mg/3 Ml Neb Soln)  3 ml NEB

ONETIME ONE


   Stop: 10/05/21 08:21


   Last Admin: 10/05/21 08:37 Dose:  3 ml


   Documented by: 


Enoxaparin Sodium (Enoxaparin 40 Mg/0.4 Ml Syringe)  40 mg SUBCUT DAILY [facility]


   Last Admin: 10/05/21 14:49 Dose:  40 mg


   Documented by: 


Enoxaparin Sodium (Enoxaparin 60 Mg/0.6 Ml Syringe)  20 mg SUBCUT ONETIME ONE


   Stop: 10/05/21 16:31


   Last Admin: 10/05/21 16:53 Dose:  20 mg


   Documented by: 


Furosemide (Furosemide 40 Mg/4 Ml Vial)  40 mg IVPUSH NOW ONE


   Stop: 10/05/21 20:04


   Last Admin: 10/05/21 21:33 Dose:  40 mg


   Documented by: 


Sodium Chloride (Normal Saline)  100 mls @ 60 mls/min IV ASDIRECTED [facility]


   Last Admin: 10/04/21 18:12 Dose:  60 mls/min


   Documented by: 


Sodium Chloride (Normal Saline)  1,000 mls @ 250 mls/hr IV ASDIRECTED [facility]


   Last Admin: 10/04/21 22:14 Dose:  250 mls/hr


   Documented by: 


Ceftriaxone Sodium 1 gm/ (Sodium Chloride)  100 mls @ 200 mls/hr IV ONETIME ONE


   Stop: 10/05/21 09:25


   Last Admin: 10/05/21 09:20 Dose:  200 mls/hr


   Documented by: 


Vancomycin HCl 1 gm/ Sodium (Chloride)  250 mls @ 250 mls/hr IV Q18H [facility]


   Last Admin: 10/05/21 14:26 Dose:  Not Given


   Documented by: 


Vancomycin HCl 1 gm/ Sodium (Chloride)  250 mls @ 250 mls/hr IV Q18H [facility]


   Last Admin: 10/05/21 15:37 Dose:  Not Given


   Documented by: 


Piperacillin Sod/Tazobactam (Sod 4.5 gm/ Sodium Chloride)  100 mls @ 200 mls/hr 

IV ONETIME ONE


   Stop: 10/05/21 14:59


   Last Admin: 10/05/21 14:19 Dose:  200 mls/hr


   Documented by: 


Sodium Chloride (Normal Saline)  1,000 mls @ 100 mls/hr IV ASDIRECTED [facility]


Iopamidol (Iopamidol 755 Mg/Ml 100 Ml Bottle)  100 ml IVPUSH ONETIME ONE


   Stop: 10/04/21 18:12


   Last Admin: 10/04/21 18:12 Dose:  100 ml


   Documented by: 


Methylprednisolone Sodium Succinate (Methylprednisolone Sodium Succinate 125 

Mg/2 Ml Sdv)  125 mg IVPUSH ONETIME ONE


   Stop: 10/05/21 08:21


   Last Admin: 10/05/21 08:25 Dose:  125 mg


   Documented by: 


Sodium Chloride (Sodium Chloride 0.9% 10 Ml Sdv)  10 ml FLUSH ONETIME ONE


   Stop: 10/04/21 18:12


   Last Admin: 10/04/21 20:31 Dose:  10 ml


   Documented by: 











- Exam


Quality Assessment: Supplemental Oxygen (High flow 60 L with FiO2 of 80%.), DVT 

Prophylaxis.  No: Urine Catheter


General: Alert, Oriented, Cooperative, No Acute Distress


HEENT: Pupils Equal, Pupils Reactive, Mucous Membr. Moist/Pink


Neck: Supple, Trachea Midline


Lungs: Decreased Breath Sounds, Crackles, Wheezing.  No: Normal Respiratory 

Effort (Tachypnea), Rhonchi


Cardiovascular: Regular Rate, Regular Rhythm


GI/Abdominal Exam: Normal Bowel Sounds, Soft, Non-Tender, No Distention


 (Female) Exam: Deferred


Back Exam: Normal Inspection, Full Range of Motion


Extremities: Normal Inspection, Normal Range of Motion, Non-Tender, No Pedal 

Edema, Normal Capillary Refill


Peripheral Pulses: 2+: Radial (L), Radial (R), Dorsalis Pedis (L), Dorsalis 

Pedis (R)


Skin: Warm, Dry, Intact


Neurological: No New Focal Deficit


Psy/Mental Status: Alert, Normal Affect, Normal Mood





- Patient Data


Lab Results Last 24 hrs: 


                         Laboratory Results - last 24 hr











  10/05/21 10/05/21 10/05/21 Range/Units





  07:56 09:36 09:36 


 


WBC     (3.98-10.04)  K/mm3


 


RBC     (3.98-5.22)  M/mm3


 


Hgb     (11.2-15.7)  gm/dl


 


Hct     (34.1-44.9)  %


 


MCV     (79.4-94.8)  fl


 


MCH     (25.6-32.2)  pg


 


MCHC     (32.2-35.5)  g/dl


 


RDW Std Deviation     (36.4-46.3)  fL


 


Plt Count     (182-369)  K/mm3


 


MPV     (9.4-12.3)  fl


 


Neut % (Auto)     (34.0-71.1)  %


 


Lymph % (Auto)     (19.3-51.7)  %


 


Mono % (Auto)     (4.7-12.5)  %


 


Eos % (Auto)     (0.7-5.8)  


 


Baso % (Auto)     (0.1-1.2)  %


 


Neut # (Auto)     (1.56-6.13)  K/mm3


 


Lymph # (Auto)     (1.18-3.74)  K/mm3


 


Mono # (Auto)     (0.24-0.36)  K/mm3


 


Eos # (Auto)     (0.04-0.36)  K/mm3


 


Baso # (Auto)     (0.01-0.08)  K/mm3


 


Sodium     (136-145)  mEq/L


 


Potassium     (3.5-5.1)  mEq/L


 


Chloride     ()  mEq/L


 


Carbon Dioxide     (21-32)  mEq/L


 


Anion Gap     (5-15)  


 


BUN     (7-18)  mg/dL


 


Creatinine     (0.55-1.02)  mg/dL


 


Est Cr Clr Drug Dosing     mL/min


 


Estimated GFR (MDRD)     (>60)  mL/min


 


BUN/Creatinine Ratio     (14-18)  


 


Glucose     (70-99)  mg/dL


 


Lactic Acid     (0.4-2.0)  mmol/L


 


Calcium     (8.5-10.1)  mg/dL


 


Magnesium     (1.8-2.4)  mg/dL


 


Total Bilirubin     (0.2-1.0)  mg/dL


 


AST     (15-37)  U/L


 


ALT     (14-59)  U/L


 


Alkaline Phosphatase     ()  U/L


 


Troponin I  0.067 H*    (0.00-0.056)  ng/mL


 


C-Reactive Protein     (<1.0)  mg/dL


 


NT-Pro-B Natriuret Pep     (0-450)  pg/mL


 


Total Protein     (6.4-8.2)  g/dl


 


Albumin     (3.4-5.0)  g/dl


 


Globulin     gm/dL


 


Albumin/Globulin Ratio     (1-2)  


 


Procalcitonin   0.29 H   ng/mL


 


Mycoplasma pneumon IgM    Negative  (NEGATIVE)  














  10/05/21 10/05/21 10/05/21 Range/Units





  15:00 15:00 15:00 


 


WBC  6.36    (3.98-10.04)  K/mm3


 


RBC  3.78 L    (3.98-5.22)  M/mm3


 


Hgb  11.3  D    (11.2-15.7)  gm/dl


 


Hct  33.8 L    (34.1-44.9)  %


 


MCV  89.4    (79.4-94.8)  fl


 


MCH  29.9    (25.6-32.2)  pg


 


MCHC  33.4    (32.2-35.5)  g/dl


 


RDW Std Deviation  44.9    (36.4-46.3)  fL


 


Plt Count  221    (182-369)  K/mm3


 


MPV  9.7    (9.4-12.3)  fl


 


Neut % (Auto)  91.1 H    (34.0-71.1)  %


 


Lymph % (Auto)  5.5 L    (19.3-51.7)  %


 


Mono % (Auto)  2.8 L    (4.7-12.5)  %


 


Eos % (Auto)  0 L    (0.7-5.8)  


 


Baso % (Auto)  0.0 L    (0.1-1.2)  %


 


Neut # (Auto)  5.79    (1.56-6.13)  K/mm3


 


Lymph # (Auto)  0.35 L    (1.18-3.74)  K/mm3


 


Mono # (Auto)  0.18 L    (0.24-0.36)  K/mm3


 


Eos # (Auto)  0.00 L    (0.04-0.36)  K/mm3


 


Baso # (Auto)  0.00 L    (0.01-0.08)  K/mm3


 


Sodium   135 L   (136-145)  mEq/L


 


Potassium   3.5   (3.5-5.1)  mEq/L


 


Chloride   100   ()  mEq/L


 


Carbon Dioxide   21   (21-32)  mEq/L


 


Anion Gap   17.5 H   (5-15)  


 


BUN   16   (7-18)  mg/dL


 


Creatinine   0.5 L   (0.55-1.02)  mg/dL


 


Est Cr Clr Drug Dosing   68.84   mL/min


 


Estimated GFR (MDRD)   > 60   (>60)  mL/min


 


BUN/Creatinine Ratio   32.0 H   (14-18)  


 


Glucose   146 H   (70-99)  mg/dL


 


Lactic Acid    1.0  (0.4-2.0)  mmol/L


 


Calcium   8.5   (8.5-10.1)  mg/dL


 


Magnesium   2.0   (1.8-2.4)  mg/dL


 


Total Bilirubin     (0.2-1.0)  mg/dL


 


AST     (15-37)  U/L


 


ALT     (14-59)  U/L


 


Alkaline Phosphatase     ()  U/L


 


Troponin I     (0.00-0.056)  ng/mL


 


C-Reactive Protein   31.5 H*   (<1.0)  mg/dL


 


NT-Pro-B Natriuret Pep     (0-450)  pg/mL


 


Total Protein     (6.4-8.2)  g/dl


 


Albumin     (3.4-5.0)  g/dl


 


Globulin     gm/dL


 


Albumin/Globulin Ratio     (1-2)  


 


Procalcitonin     ng/mL


 


Mycoplasma pneumon IgM     (NEGATIVE)  














  10/05/21 10/06/21 10/06/21 Range/Units





  19:15 04:44 04:44 


 


WBC   7.23   (3.98-10.04)  K/mm3


 


RBC   3.73 L   (3.98-5.22)  M/mm3


 


Hgb   11.3   (11.2-15.7)  gm/dl


 


Hct   33.4 L   (34.1-44.9)  %


 


MCV   89.5   (79.4-94.8)  fl


 


MCH   30.3   (25.6-32.2)  pg


 


MCHC   33.8   (32.2-35.5)  g/dl


 


RDW Std Deviation   44.8   (36.4-46.3)  fL


 


Plt Count   261   (182-369)  K/mm3


 


MPV   10.5   (9.4-12.3)  fl


 


Neut % (Auto)   83.6 H   (34.0-71.1)  %


 


Lymph % (Auto)   8.9 L   (19.3-51.7)  %


 


Mono % (Auto)   7.1   (4.7-12.5)  %


 


Eos % (Auto)   0 L   (0.7-5.8)  


 


Baso % (Auto)   0.1   (0.1-1.2)  %


 


Neut # (Auto)   6.05   (1.56-6.13)  K/mm3


 


Lymph # (Auto)   0.64 L   (1.18-3.74)  K/mm3


 


Mono # (Auto)   0.51 H   (0.24-0.36)  K/mm3


 


Eos # (Auto)   0.00 L   (0.04-0.36)  K/mm3


 


Baso # (Auto)   0.01   (0.01-0.08)  K/mm3


 


Sodium    137  (136-145)  mEq/L


 


Potassium    3.3 L  (3.5-5.1)  mEq/L


 


Chloride    103  ()  mEq/L


 


Carbon Dioxide    24  (21-32)  mEq/L


 


Anion Gap    13.3  (5-15)  


 


BUN    19 H  (7-18)  mg/dL


 


Creatinine    0.6  (0.55-1.02)  mg/dL


 


Est Cr Clr Drug Dosing    57.37  mL/min


 


Estimated GFR (MDRD)    > 60  (>60)  mL/min


 


BUN/Creatinine Ratio    31.7 H  (14-18)  


 


Glucose    146 H  (70-99)  mg/dL


 


Lactic Acid     (0.4-2.0)  mmol/L


 


Calcium    8.4 L  (8.5-10.1)  mg/dL


 


Magnesium    1.8  (1.8-2.4)  mg/dL


 


Total Bilirubin    0.5  (0.2-1.0)  mg/dL


 


AST    26  (15-37)  U/L


 


ALT    26  (14-59)  U/L


 


Alkaline Phosphatase    90  ()  U/L


 


Troponin I    0.031  (0.00-0.056)  ng/mL


 


C-Reactive Protein    24.2 H*  (<1.0)  mg/dL


 


NT-Pro-B Natriuret Pep  4148 H    (0-450)  pg/mL


 


Total Protein    6.5  (6.4-8.2)  g/dl


 


Albumin    1.8 L  (3.4-5.0)  g/dl


 


Globulin    4.7  gm/dL


 


Albumin/Globulin Ratio    0.4 L  (1-2)  


 


Procalcitonin     ng/mL


 


Mycoplasma pneumon IgM     (NEGATIVE)  











Result Diagrams: 


                                 10/06/21 04:44





                                 10/06/21 04:44





Sepsis Event Note





- Evaluation


Sepsis Screening Result: Sepsis Risk





- Focused Exam


Vital Signs: 


                                   Vital Signs











  Temp Pulse Resp BP Pulse Ox Pulse Ox


 


 10/06/21 06:39       89 L


 


 10/06/21 06:01  97.5 F  90  24 H  123/81  88 L 


 


 10/06/21 03:52  97.5 F  94  26 H  144/85 H  89 L 


 


 10/05/21 22:38       87 L


 


 10/05/21 21:32  97.5 F  90  24 H  125/87  88 L 














- Problem List & Annotations


(1) History of COVID-19


SNOMED Code(s): 698300368879966213, 704542359412101764


   Code(s): Z86.16 - PERSONAL HISTORY OF COVID-19   Status: Chronic   Priority: 

Medium   Current Visit: Yes   





(2) Elevated d-dimer


SNOMED Code(s): 623574807


   Code(s): R79.89 - OTHER SPECIFIED ABNORMAL FINDINGS OF BLOOD CHEMISTRY   

Status: Acute   Priority: Medium   Current Visit: Yes   





(3) Elevated troponin


SNOMED Code(s): 611917648, 229003700, 123784043


   Code(s): R77.8 - OTHER SPECIFIED ABNORMALITIES OF PLASMA PROTEINS   Status: 

Resolved   Priority: High   Current Visit: Yes   





(4) Pneumonia


SNOMED Code(s): 867850166


   Code(s): J18.9 - PNEUMONIA, UNSPECIFIED ORGANISM   Status: Suspected   

Priority: High   Current Visit: Yes   


Qualifiers: 


   Pneumonia type: due to unspecified organism   Laterality: unspecified 

laterality   Lung location: unspecified part of lung   Qualified Code(s): J18.9 

- Pneumonia, unspecified organism   





(5) Dyspnea


SNOMED Code(s): 812152795


   Code(s): R06.00 - DYSPNEA, UNSPECIFIED   Status: Acute   Priority: High   

Current Visit: Yes   


Qualifiers: 


   Dyspnea type: unspecified   Qualified Code(s): R06.00 - Dyspnea, unspecified 

 





(6) Hypoxia


SNOMED Code(s): 126055674


   Code(s): R09.02 - HYPOXEMIA   Status: Acute   Priority: High   Current Visit:

Yes   





(7) UTI (urinary tract infection)


SNOMED Code(s): 53969744


   Code(s): N39.0 - URINARY TRACT INFECTION, SITE NOT SPECIFIED   Status: Acute 

 Priority: High   Current Visit: Yes   


Qualifiers: 


   Urinary tract infection type: site unspecified   Hematuria presence: without 

hematuria   Qualified Code(s): N39.0 - Urinary tract infection, site not 

specified   





(8) Elevated brain natriuretic peptide (BNP) level


SNOMED Code(s): 113879187, 204899578


   Code(s): R79.89 - OTHER SPECIFIED ABNORMAL FINDINGS OF BLOOD CHEMISTRY   

Status: Acute   Priority: High   Current Visit: Yes   





(9) On home oxygen therapy


SNOMED Code(s): 370533723352


   Code(s): Z99.81 - DEPENDENCE ON SUPPLEMENTAL OXYGEN   Status: Chronic   

Priority: Medium   Current Visit: Yes   





(10) GERD (gastroesophageal reflux disease)


SNOMED Code(s): 273273816


   Code(s): K21.9 - GASTRO-ESOPHAGEAL REFLUX DISEASE WITHOUT ESOPHAGITIS   

Status: Chronic   Priority: Low   Current Visit: No   


Qualifiers: 


   Esophagitis presence: esophagitis presence not specified   Qualified Code(s):

K21.9 - Gastro-esophageal reflux disease without esophagitis   





(11) Meniere disease


SNOMED Code(s): 53860380


   Code(s): H81.09 - MENIERE'S DISEASE, UNSPECIFIED EAR   Status: Chronic   

Priority: Low   Current Visit: No   


Qualifiers: 


   Laterality: unspecified laterality   Qualified Code(s): H81.09 - Meniere's 

disease, unspecified ear   





(12) DVT (deep venous thrombosis)


SNOMED Code(s): 355909267


   Code(s): I82.409 - ACUTE EMBOLISM AND THOMBOS UNSP DEEP VN UNSP LOWER 

EXTREMITY   Status: Acute   Priority: High   Current Visit: Yes   


Qualifiers: 


   DVT location: lower extremity   Affected thrombotic vein of extremity: 

unspecified vein of extremity   Chronicity: acute   Laterality: left   Qualified

Code(s): I82.402 - Acute embolism and thrombosis of unspecified deep veins of 

left lower extremity   





(13) Hypotension


SNOMED Code(s): 27730797


   Code(s): I95.9 - HYPOTENSION, UNSPECIFIED   Status: Resolved   Priority: High

  Current Visit: Yes   


Qualifiers: 


   Hypotension type: unspecified hypotension type   Qualified Code(s): I95.9 - 

Hypotension, unspecified   





(14) Acute and chronic respiratory failure


SNOMED Code(s): 30501133


   Code(s): J96.20 - ACUTE AND CHR RESP FAILURE, UNSP W HYPOXIA OR HYPERCAPNIA  

Status: Acute   Priority: High   Current Visit: Yes   


Qualifiers: 


   Respiratory failure complication: hypoxia   Qualified Code(s): J96.21 - Acute

and chronic respiratory failure with hypoxia   





(15) Myocarditis


SNOMED Code(s): 87214914


   Code(s): I51.4 - MYOCARDITIS, UNSPECIFIED   Status: Suspected   Priority: 

High   Current Visit: Yes   


Qualifiers: 


   Myocarditis type: infective   Infective myocarditis organism: viral   

Chronicity: acute   Qualified Code(s): I40.0 - Infective myocarditis   





(16) Hypokalemia


SNOMED Code(s): 80947055


   Code(s): E87.6 - HYPOKALEMIA   Status: Acute   Priority: High   Current V

isit: Yes   





- Problem List Review


Problem List Initiated/Reviewed/Updated: Yes





- My Orders


Last 24 Hours: 


My Active Orders





10/05/21 Lunch


Regular Diet [DIET] 





10/05/21 11:19


Height and Weight [RC] 06 


Intake and Output [RC] 04,16 


Oxygen Therapy [RC] ASDIRECTED 


RT Chest Physiotherapy [RC] ASDIRECTED 


RT Incentive Spirometry [RC] ASDIRECTED 


Up With Assistance [RC] ASDIRECTED 


Vital Signs [RC] 0400,1000,1600,2200 


Albuterol [Proventil Neb Soln]   2.5 mg NEB Q2H PRN 


Docusate Sodium [Colace]   100 mg PO Q12H PRN 


Ondansetron [Zofran]   4 mg IV Q6H PRN 


Isolation [COMM] Routine 





10/05/21 11:21


OT Evaluation and Treatment [CONS] Routine 


PT Evaluation and Treatment [CONS] Routine 


Respiratory Care Assess and Treatment [CONS] Routine 





10/05/21 11:27


STREP PNEUMONIAE ANTIGEN [MREF] Routine 





10/05/21 11:28


RT Oxygen High Flow [RESPCARE] Routine 





10/05/21 11:30


Pharmacy to Dose - Vancomycin   1 dose .XX ASDIRECTED 





10/05/21 14:30


RESPIRATORY PANEL Routine 





10/05/21 15:00


Vancomycin [Vancocin] 1 gm   Sodium Chloride 0.9% [Normal Saline (AdvBag)] 250 

ml IV Q18H 





10/05/21 15:30


guaiFENesin [Mucinex]   600 mg PO BID 





10/05/21 15:34


Blood Culture x2 Reflex Set [OM.PC] Stat 





10/05/21 16:00


Albuterol/Ipratropium [DuoNeb 3.0-0.5 MG/3 ML]   3 ml NEB QIDRT 





10/05/21 16:20


BLOOD CULTURE [MREF] Stat 





10/05/21 16:30


BLOOD CULTURE [MREF] Stat 





10/05/21 22:30


Piperacillin/Tazobactam [Piperacil-Tazobact] 4.5 gm   Sodium Chloride 0.9% 

[Normal Saline] 100 ml IV Q8H 





10/06/21 04:44


CBC WITH AUTO DIFF [HEME] AM 





10/06/21 07:20


Potassium Chloride [Klor-Con M20]   20 meq PO ONETIME ONE 





10/06/21 09:00


Apixaban [Eliquis]   10 mg PO BID 





10/07/21 05:11


C-REACTIVE PROTEIN [CHEM] AM 


CBC WITH AUTO DIFF [HEME] AM 


COMPREHENSIVE METABOLIC PN,CMP [CHEM] AM 


MAGNESIUM [CHEM] AM 





10/08/21 05:11


C-REACTIVE PROTEIN [CHEM] AM 


CBC WITH AUTO DIFF [HEME] AM 


COMPREHENSIVE METABOLIC PN,CMP [CHEM] AM 


MAGNESIUM [CHEM] AM 





10/09/21 05:11


C-REACTIVE PROTEIN [CHEM] AM 


CBC WITH AUTO DIFF [HEME] AM 


COMPREHENSIVE METABOLIC PN,CMP [CHEM] AM 


MAGNESIUM [CHEM] AM 














- Assessment


Assessment:: 


Admission assessment - 10/5/2021


* 81-year-old female who presents to ED on 10/4/2021 with low oxygen saturations


* History of Mnire's disease, GERD, and prior Covid pneumonia


* Was hospitalized from 9/13/2021 through 9/27/2021 with COVID-19 pneumonia at 

  [facility] in [place]. 


* Discharged home on 4 L of oxygen with activity after completing 4 days of 

  remdesivir and 10 days of Decadron. 


* Of note patient was noted to have episodes of bradycardia and hypotension with

   heart rate in the 30s and 40s. She was noticed to have episodes of Mobitz 

  type I AV block and 2.5-second sinus pauses. It was believed this was due to 

  remdesivir however this continued after stopping. Patient was to follow-up 

  with EP and have a 2-week cardiac monitor after discharge. 


* Prior to presenting to the ED she was noted to have saturations in the low 

  50s. 


* Patient's  increased her oxygen to 8 L via nasal cannula. 


* Denies any recent fever, chills, nausea, vomiting, diarrhea, urinary symptoms,

   or smoking history. 


* Of note patient did have acute cystitis with an E. coli UTI well in the 

  hospital in [place] and completed 5 days of Rocephin there. 


* 12-lead EKG was obtained showing a sinus tachycardia at 109 bpm with a LAFB 

  and very mild ST elevation in V2 and V3. 


* Placed on a nonrebreather mask which improved her saturations to 90 to 91%. 


* Noted to be dyspneic and only able to complete a few word sentences. 


* Labs are obtained:


   * WBC of 8.57. 


   * Hemoglobin 13.0. 


   * Hematocrit 30.5. 


   * Platelet 250,000. 


   * Neutrophils are elevated 75.7%. 


   * D-dimer is very high at 8.16. 


   * Sodium is low at 129. 


   * Potassium 3.8. 


   * Chloride 94. 


   * Carbon dioxide 24. 


   * Anion gap is 14.8. 


   * BUN is 15. Creatinine 0.6. GFR greater than 60. 


   * Glucose is 126. 


   * Calcium 8.7. 


   * Magnesium 1.8. 


   * Total bilirubin 0.6. 


   * AST is 36, ALT 31, alkaline phosphatase 86. 


   * Troponin is elevated at 0.127-->0.156-->0.067. 


   * CRP is very high at 29.3. 


   * Protein 7.1. 


   * Albumin 2.1. 


   * proBNP is 3117


   * UA is obtained and is mildly positive with slightly cloudy urine, 1+ 

     protein, 3+ ketones, 2+ occult blood, 1+ leukocyte esterase, 5-10 RBCs, 20-

     30 WBCs, rare WBC clumps, and moderate bacteria.


* ABG obtained in the left radial with a pH of 7.44. PCO2 of 30.9. PO2 of 58.0. 

  HCO3 of 20.5. O2 saturations 86%. Base excess is -2.3. Aa gradient is 15.0. 

  This is obtained while on nonrebreather at 15 L. 


* Facility was out of CPAP and BiPAP-> started on high flow O2 with saturations 

  in the low 90s. 


* CTA of the chest is obtained to rule out PE and shows:


   * 1. No findings of pulmonary embolism. 


   * 2. Minimal left-sided pleural effusion. 


   * 3. Diffuse emphysematous changes seen. Both lungs show diffuse increased 

     density uncertain how much of this represents diffuse fibrosis versus poss

     ible superimposed pneumonia. Old comparison studies would be needed if 

     available. 


   * 4. 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 


   * 5. Other findings which are felt to be chronic as described above. 


* Chest x-ray shows scattered infiltrates bilaterally most prominent in the left

   lower lobe. 


* Plan was to transfer the patient for continued care due to elevated troponin 

  however [place] beds are noted to be available [place] or 

  [place]. 


* Noted to have a blood pressure of 80/61 and is given a 500 mill fluid bolus 

  over 2 hours. 


* Given 1 dose of Rocephin in the ED. She is also given 125 mg Solu-Medrol and a

   DuoNeb, which she reports greatly helped.


* Ultimately inpatient bed does open up at our facility and she is admitted to 

  the floor on telemetry for management of her hypoxia, suspected pneumonia, 

  elevated BNP, elevated troponin - likely demand ischemia, and questionable 

  UTI. 


* On floor bilateral lower extremity ultrasound shows thrombus within the left 

  mid and distal superficial, femoral, popliteal, posterior tibial, and peroneal

   veins.





10/6/2021


This is a 81 years old female post Covid infection who was hospitalized in 

[place] for approximately 2 weeks who presented to our ED with low saturations.

  On admission D-dimer was noted to be elevated however CTA was negative for any

 PE.  Lower extremity ultrasound did show a DVT in the left extremity and 

patient was started on 1 mg/kg Lovenox transitioning to 10 mg twice daily 

Eliquis today.  Patient has been requiring high flow 60 L and was weaned down to

 80% FiO2 today.  Unfortunately given patient's history of recent Covid 

infection CTA was not very helpful in differentiating pulmonary cause.  Given 

recent hospitalization there are concerns of a secondary bacterial infection.  

WBC has been WNL however neutrophils are elevated.  Procalcitonin was obtained 

and was 0.29.  Lactic acid yesterday was 1.0.  Patient was noted to be 

hypotensive in the emergency room and was given 2 fluid boluses.  She was also 

started on fluids over concerns of sepsis.  UA was positive and urine culture is

 growing out greater than 100,000 CFU's of gram-negative rods thus far.  She 

does have a history of a E. coli UTI treated with Rocephin in [place].  Patient

 denies any urinary symptoms.  proBNP was elevated.  Echocardiogram was obtained

 on 10/5/2021  And interpreted as: " 1.  The left ventricular internal cavity 

size is normal. 2.  Normal left ventricular systolic function. 3.  Left 

ventricular ejection fraction, by visual estimation, is 55 to 60%. 4.  No 

regional wall motion abnormalities. 5.  Mild proximal septal hypertrophy. 6.  

Aortic valve is tricuspid. 7.  Mild mitral valve regurgitation. 8.  Trace 

tricuspid valve regurgitation. 9.  Mild pulmonic valve regurgitation. 10.  The 

inferior vena cava is normal. 11.  The right ventricular systolic pressure is 

mildly elevated at 32.2 mmHg."  Patient was started on 20 mg IV push Lasix 3 

times daily yesterday and has had over 1 L output.  Potassium today is down to 

3.3 and this is being supplemented.  Sodium 137.  Carbon dioxide 24.  Anion gap 

is 13.3.  BUN is 19.  Creatinine 0.6.  GFR greater than 60.  Glucose is 146.  

Calcium 8.4.  Magnesium is 1.8.  Total bilirubin 0.5.  AST is 26, ALT 26, 

alkaline phosphatase 90.  Troponin today was down to 0.031.  CRP is down to 

24.2.  Albumin is down to 1.8.  Mycoplasma and strep pneumonia were both checked

 and were negative.  Respiratory viral panel was obtained and was negative 

however patient did return positive for Covid, which is unsurprising given the 

patient's recent known Covid infection.  We will continue current treatment 

plan.  It is felt it is prudent to continue IV antibiotics for now given 

patient's symptoms and lab results.  We will continue diuresis and Eliquis for 

DVT treatment.








- Plan


Plan:: 


Pneumonia - suspected


Dyspnea


Hypoxia


History of COVID-19


On home oxygen therapy


Acute on chronic respiratory failure 


* Droplet isolation (airborne/contact while on high flow O2)


* O2 as needed to keep saturation greater than 90%


* I-S/Acapella


* Given recent hospitalization we will start every 8 hours Zosyn and vancomycin 

  with pharmacy to dose


* High flow oxygen as directed


* Sputum culture if able to produce


* 4 times daily scheduled DuoNebs


* Every 2 hours as needed albuterol nebulizer


* Consult RT


* Telemetry


* Blood cultures pending


* Continuous pulse oximetry


* Mucinex BID


* Recommend PFT after recovery due to emphysematous changes noted in CTA





DVT


Elevated d-dimer


* CTA in ED negative for PE


* Start Eliquis 10mg BID for 7 days tomorrow then transition to 5mg BID





Myocarditis - suspected 


Elevated troponin, resolved 


Elevated brain natriuretic peptide (BNP) level


* Troponin now WNL


* Echocardiogram obtained as above 


* Monitor patient's weight


* Strict I&O's


* Telemetry


* 20mg TID lasix 


* Supplement potassium


* Monitor CRP





UTI (urinary tract infection)


* ? Chronic bacteriuria as patient is reportedly asymptomatic.


* UA weakly positivepatient was treated for E. coli UTI with Rocephin in 

  [place]


* Urine culture showing >100cfu E. Coli, sensitivities pending 


* Blood cultures pending


* Zosyn as above





GERD (gastroesophageal reflux disease)


* No acute concerns


* Monitor 





Meniere disease


* Acute concerns


* Monitor





Hypokalemia


* Supplement





Hypotension - resolved


* Noted in ED and multiple fluid boluses given


* Monitor 





Code status: DNR/DNI


PCP: Dr. Monroe in [place]


DVT prophylaxis: Lovenox


Disposition: Patient mated to the floor for management and further work-up of 

suspected pneumonia, hypoxia, elevated troponin, elevated D-dimer, and weak UTI.

  Anticipated length of stay 3 to 4 days.














<Renay Eid - Last Filed: 10/06/21 15:50>





- Patient Data


Vitals - Most Recent: 


                                Last Vital Signs











Temp  97.5 F   10/06/21 07:53


 


Pulse  95   10/06/21 07:53


 


Resp  22 H  10/06/21 07:53


 


BP  126/86   10/06/21 07:53


 


Pulse Ox  91 L  10/06/21 15:10











I&O - Last 24 Hours: 


                                 Intake & Output











 10/06/21 10/06/21 10/06/21





 06:59 14:59 22:59


 


Intake Total 100  0


 


Output Total 1100  


 


Balance -1000  0











Lab Results Last 24 Hours: 


                         Laboratory Results - last 24 hr











  10/05/21 10/05/21 10/05/21 Range/Units





  09:36 14:30 15:00 


 


WBC     (3.98-10.04)  K/mm3


 


RBC     (3.98-5.22)  M/mm3


 


Hgb     (11.2-15.7)  gm/dl


 


Hct     (34.1-44.9)  %


 


MCV     (79.4-94.8)  fl


 


MCH     (25.6-32.2)  pg


 


MCHC     (32.2-35.5)  g/dl


 


RDW Std Deviation     (36.4-46.3)  fL


 


Plt Count     (182-369)  K/mm3


 


MPV     (9.4-12.3)  fl


 


Neut % (Auto)     (34.0-71.1)  %


 


Lymph % (Auto)     (19.3-51.7)  %


 


Mono % (Auto)     (4.7-12.5)  %


 


Eos % (Auto)     (0.7-5.8)  


 


Baso % (Auto)     (0.1-1.2)  %


 


Neut # (Auto)     (1.56-6.13)  K/mm3


 


Lymph # (Auto)     (1.18-3.74)  K/mm3


 


Mono # (Auto)     (0.24-0.36)  K/mm3


 


Eos # (Auto)     (0.04-0.36)  K/mm3


 


Baso # (Auto)     (0.01-0.08)  K/mm3


 


Manual Slide Review     


 


Sodium    135 L  (136-145)  mEq/L


 


Potassium    3.5  (3.5-5.1)  mEq/L


 


Chloride    100  ()  mEq/L


 


Carbon Dioxide    21  (21-32)  mEq/L


 


Anion Gap    17.5 H  (5-15)  


 


BUN    16  (7-18)  mg/dL


 


Creatinine    0.5 L  (0.55-1.02)  mg/dL


 


Est Cr Clr Drug Dosing    68.84  mL/min


 


Estimated GFR (MDRD)    > 60  (>60)  mL/min


 


BUN/Creatinine Ratio    32.0 H  (14-18)  


 


Glucose    146 H  (70-99)  mg/dL


 


Lactic Acid     (0.4-2.0)  mmol/L


 


Calcium    8.5  (8.5-10.1)  mg/dL


 


Magnesium    2.0  (1.8-2.4)  mg/dL


 


Total Bilirubin     (0.2-1.0)  mg/dL


 


AST     (15-37)  U/L


 


ALT     (14-59)  U/L


 


Alkaline Phosphatase     ()  U/L


 


Troponin I     (0.00-0.056)  ng/mL


 


C-Reactive Protein    31.5 H*  (<1.0)  mg/dL


 


NT-Pro-B Natriuret Pep     (0-450)  pg/mL


 


Total Protein     (6.4-8.2)  g/dl


 


Albumin     (3.4-5.0)  g/dl


 


Globulin     gm/dL


 


Albumin/Globulin Ratio     (1-2)  


 


Procalcitonin  0.29 H    ng/mL


 


Adenovirus (PCR)   Not detected   (Not Detected)  


 


B. pertussis DNA (PCR)   Not detected   (Not Detected)  


 


B.parapertussis DNA PCR   Not detected   (Not Detected)  


 


C. pneumoniae DNA (PCR)   Not detected   (Not Detected)  


 


Coronavirus OC43 (PCR)   Not detected   (Not Detected)  


 


Coronavirus HKU1 (PCR)   Not detected   (Not Detected)  


 


Coronavirus 229E (PCR)   Not detected   (Not Detected)  


 


Coronavirus NL63 (PCR)   Not detected   (Not Detected)  


 


Human Metapneumovir PCR   Not detected   (Not Detected)  


 


Influenza A (RT-PCR)   Not detected   (Not Detected)  


 


Influenza B (RT-PCR)   Not detected   (Not Detected)  


 


M. pneumoniae (PCR)   Not detected   (Not Detected)  


 


Parainfluenza 1 (PCR)   Not detected   (Not Detected)  


 


Parainfluenza 2 (PCR)   Not detected   (Not Detected)  


 


Parainfluenza 3 (PCR)   Not detected   (Not Detected)  


 


Parainfluenza 4 (PCR)   Not detected   (Not Detected)  


 


RSV (PCR)   Not detected   (Not Detected)  


 


Entero/Rhino (PCR)   Not detected   (Not Detected)  


 


SARS-CoV-2 (PCR)   Detected H   (Not Detected)  














  10/05/21 10/05/21 10/06/21 Range/Units





  15:00 19:15 04:44 


 


WBC    7.23  (3.98-10.04)  K/mm3


 


RBC    3.73 L  (3.98-5.22)  M/mm3


 


Hgb    11.3  (11.2-15.7)  gm/dl


 


Hct    33.4 L  (34.1-44.9)  %


 


MCV    89.5  (79.4-94.8)  fl


 


MCH    30.3  (25.6-32.2)  pg


 


MCHC    33.8  (32.2-35.5)  g/dl


 


RDW Std Deviation    44.8  (36.4-46.3)  fL


 


Plt Count    261  (182-369)  K/mm3


 


MPV    10.5  (9.4-12.3)  fl


 


Neut % (Auto)    83.6 H  (34.0-71.1)  %


 


Lymph % (Auto)    8.9 L  (19.3-51.7)  %


 


Mono % (Auto)    7.1  (4.7-12.5)  %


 


Eos % (Auto)    0 L  (0.7-5.8)  


 


Baso % (Auto)    0.1  (0.1-1.2)  %


 


Neut # (Auto)    6.05  (1.56-6.13)  K/mm3


 


Lymph # (Auto)    0.64 L  (1.18-3.74)  K/mm3


 


Mono # (Auto)    0.51 H  (0.24-0.36)  K/mm3


 


Eos # (Auto)    0.00 L  (0.04-0.36)  K/mm3


 


Baso # (Auto)    0.01  (0.01-0.08)  K/mm3


 


Manual Slide Review    Normal smear  


 


Sodium     (136-145)  mEq/L


 


Potassium     (3.5-5.1)  mEq/L


 


Chloride     ()  mEq/L


 


Carbon Dioxide     (21-32)  mEq/L


 


Anion Gap     (5-15)  


 


BUN     (7-18)  mg/dL


 


Creatinine     (0.55-1.02)  mg/dL


 


Est Cr Clr Drug Dosing     mL/min


 


Estimated GFR (MDRD)     (>60)  mL/min


 


BUN/Creatinine Ratio     (14-18)  


 


Glucose     (70-99)  mg/dL


 


Lactic Acid  1.0    (0.4-2.0)  mmol/L


 


Calcium     (8.5-10.1)  mg/dL


 


Magnesium     (1.8-2.4)  mg/dL


 


Total Bilirubin     (0.2-1.0)  mg/dL


 


AST     (15-37)  U/L


 


ALT     (14-59)  U/L


 


Alkaline Phosphatase     ()  U/L


 


Troponin I     (0.00-0.056)  ng/mL


 


C-Reactive Protein     (<1.0)  mg/dL


 


NT-Pro-B Natriuret Pep   4148 H   (0-450)  pg/mL


 


Total Protein     (6.4-8.2)  g/dl


 


Albumin     (3.4-5.0)  g/dl


 


Globulin     gm/dL


 


Albumin/Globulin Ratio     (1-2)  


 


Procalcitonin     ng/mL


 


Adenovirus (PCR)     (Not Detected)  


 


B. pertussis DNA (PCR)     (Not Detected)  


 


B.parapertussis DNA PCR     (Not Detected)  


 


C. pneumoniae DNA (PCR)     (Not Detected)  


 


Coronavirus OC43 (PCR)     (Not Detected)  


 


Coronavirus HKU1 (PCR)     (Not Detected)  


 


Coronavirus 229E (PCR)     (Not Detected)  


 


Coronavirus NL63 (PCR)     (Not Detected)  


 


Human Metapneumovir PCR     (Not Detected)  


 


Influenza A (RT-PCR)     (Not Detected)  


 


Influenza B (RT-PCR)     (Not Detected)  


 


M. pneumoniae (PCR)     (Not Detected)  


 


Parainfluenza 1 (PCR)     (Not Detected)  


 


Parainfluenza 2 (PCR)     (Not Detected)  


 


Parainfluenza 3 (PCR)     (Not Detected)  


 


Parainfluenza 4 (PCR)     (Not Detected)  


 


RSV (PCR)     (Not Detected)  


 


Entero/Rhino (PCR)     (Not Detected)  


 


SARS-CoV-2 (PCR)     (Not Detected)  














  10/06/21 Range/Units





  04:44 


 


WBC   (3.98-10.04)  K/mm3


 


RBC   (3.98-5.22)  M/mm3


 


Hgb   (11.2-15.7)  gm/dl


 


Hct   (34.1-44.9)  %


 


MCV   (79.4-94.8)  fl


 


MCH   (25.6-32.2)  pg


 


MCHC   (32.2-35.5)  g/dl


 


RDW Std Deviation   (36.4-46.3)  fL


 


Plt Count   (182-369)  K/mm3


 


MPV   (9.4-12.3)  fl


 


Neut % (Auto)   (34.0-71.1)  %


 


Lymph % (Auto)   (19.3-51.7)  %


 


Mono % (Auto)   (4.7-12.5)  %


 


Eos % (Auto)   (0.7-5.8)  


 


Baso % (Auto)   (0.1-1.2)  %


 


Neut # (Auto)   (1.56-6.13)  K/mm3


 


Lymph # (Auto)   (1.18-3.74)  K/mm3


 


Mono # (Auto)   (0.24-0.36)  K/mm3


 


Eos # (Auto)   (0.04-0.36)  K/mm3


 


Baso # (Auto)   (0.01-0.08)  K/mm3


 


Manual Slide Review   


 


Sodium  137  (136-145)  mEq/L


 


Potassium  3.3 L  (3.5-5.1)  mEq/L


 


Chloride  103  ()  mEq/L


 


Carbon Dioxide  24  (21-32)  mEq/L


 


Anion Gap  13.3  (5-15)  


 


BUN  19 H  (7-18)  mg/dL


 


Creatinine  0.6  (0.55-1.02)  mg/dL


 


Est Cr Clr Drug Dosing  57.37  mL/min


 


Estimated GFR (MDRD)  > 60  (>60)  mL/min


 


BUN/Creatinine Ratio  31.7 H  (14-18)  


 


Glucose  146 H  (70-99)  mg/dL


 


Lactic Acid   (0.4-2.0)  mmol/L


 


Calcium  8.4 L  (8.5-10.1)  mg/dL


 


Magnesium  1.8  (1.8-2.4)  mg/dL


 


Total Bilirubin  0.5  (0.2-1.0)  mg/dL


 


AST  26  (15-37)  U/L


 


ALT  26  (14-59)  U/L


 


Alkaline Phosphatase  90  ()  U/L


 


Troponin I  0.031  (0.00-0.056)  ng/mL


 


C-Reactive Protein  24.2 H*  (<1.0)  mg/dL


 


NT-Pro-B Natriuret Pep   (0-450)  pg/mL


 


Total Protein  6.5  (6.4-8.2)  g/dl


 


Albumin  1.8 L  (3.4-5.0)  g/dl


 


Globulin  4.7  gm/dL


 


Albumin/Globulin Ratio  0.4 L  (1-2)  


 


Procalcitonin   ng/mL


 


Adenovirus (PCR)   (Not Detected)  


 


B. pertussis DNA (PCR)   (Not Detected)  


 


B.parapertussis DNA PCR   (Not Detected)  


 


C. pneumoniae DNA (PCR)   (Not Detected)  


 


Coronavirus OC43 (PCR)   (Not Detected)  


 


Coronavirus HKU1 (PCR)   (Not Detected)  


 


Coronavirus 229E (PCR)   (Not Detected)  


 


Coronavirus NL63 (PCR)   (Not Detected)  


 


Human Metapneumovir PCR   (Not Detected)  


 


Influenza A (RT-PCR)   (Not Detected)  


 


Influenza B (RT-PCR)   (Not Detected)  


 


M. pneumoniae (PCR)   (Not Detected)  


 


Parainfluenza 1 (PCR)   (Not Detected)  


 


Parainfluenza 2 (PCR)   (Not Detected)  


 


Parainfluenza 3 (PCR)   (Not Detected)  


 


Parainfluenza 4 (PCR)   (Not Detected)  


 


RSV (PCR)   (Not Detected)  


 


Entero/Rhino (PCR)   (Not Detected)  


 


SARS-CoV-2 (PCR)   (Not Detected)  











Robert Results Last 24 Hours: 


                                  Microbiology











 10/04/21 23:30 Streptococcus pneumoniae Antigen (M - Final





 Urine 


 


 10/04/21 23:30 Urine Culture - Preliminary





 Urine    Escherichia Coli











Med Orders - Current: 


                               Current Medications





Albuterol (Albuterol 0.083% 2.5 Mg/3 Ml Neb Soln)  2.5 mg NEB Q2H PRN


   PRN Reason: Shortness Of Breath/wheezing


Albuterol/Ipratropium (Albuterol/Ipratropium 3.0-0.5 Mg/3 Ml Neb Soln)  3 ml NEB

 QIDRT [facility]


   Last Admin: 10/06/21 14:59 Dose:  3 ml


   Documented by: 


Apixaban (Apixaban 5 Mg Tab)  10 mg PO BID [facility]


   Stop: 10/12/21 21:01


   Last Admin: 10/06/21 08:57 Dose:  10 mg


   Documented by: 


Docusate Sodium (Docusate Sodium 100 Mg Cap)  100 mg PO Q12H PRN


   PRN Reason: Constipation


Furosemide (Furosemide 20 Mg/2 Ml Vial)  20 mg IVPUSH TIDMEALS [facility]


   Last Admin: 10/06/21 11:10 Dose:  20 mg


   Documented by: 


Guaifenesin (Guaifenesin 600 Mg Tab.Er)  600 mg PO BID [facility]


   Last Admin: 10/06/21 08:57 Dose:  600 mg


   Documented by: 


Piperacillin Sod/Tazobactam (Sod 4.5 gm/ Sodium Chloride)  100 mls @ 25 mls/hr 

IV Q8H [facility]


   Last Admin: 10/06/21 15:40 Dose:  25 mls/hr


   Documented by: 


Vancomycin HCl 1 gm/ Sodium (Chloride)  250 mls @ 250 mls/hr IV Q18H [facility]


   Last Admin: 10/06/21 11:10 Dose:  250 mls/hr


   Documented by: 


Ondansetron HCl (Ondansetron 4 Mg/2 Ml Sdv)  4 mg IV Q6H PRN


   PRN Reason: Nausea/Vomiting


Potassium Chloride (Potassium Chloride 10 Meq Tab.Er)  10 meq PO TID [facility]


   Last Admin: 10/06/21 15:40 Dose:  10 meq


   Documented by: 


Sodium Chloride (Sodium Chloride 0.9% 10 Ml Syringe)  10 ml FLUSH ASDIRECTED PRN


   PRN Reason: Keep Vein Open


   Last Admin: 10/04/21 18:12 Dose:  10 ml


   Documented by: 


Vancomycin HCl (Pharmacy To Dose - Vancomycin)  1 dose .XX ASDIRECTED [facility]





Discontinued Medications





Albuterol (Albuterol 0.083% 2.5 Mg/3 Ml Neb Soln)  2.5 mg NEB ONETIME ONE


   Stop: 10/04/21 15:19


   Last Admin: 10/04/21 15:47 Dose:  2.5 mg


   Documented by: 


Albuterol/Ipratropium (Albuterol/Ipratropium 3.0-0.5 Mg/3 Ml Neb Soln)  3 ml NEB

 ONETIME ONE


   Stop: 10/05/21 08:21


   Last Admin: 10/05/21 08:37 Dose:  3 ml


   Documented by: 


Enoxaparin Sodium (Enoxaparin 40 Mg/0.4 Ml Syringe)  40 mg SUBCUT DAILY [facility]


   Last Admin: 10/05/21 14:49 Dose:  40 mg


   Documented by: 


Enoxaparin Sodium (Enoxaparin 60 Mg/0.6 Ml Syringe)  20 mg SUBCUT ONETIME ONE


   Stop: 10/05/21 16:31


   Last Admin: 10/05/21 16:53 Dose:  20 mg


   Documented by: 


Furosemide (Furosemide 40 Mg/4 Ml Vial)  40 mg IVPUSH NOW ONE


   Stop: 10/05/21 20:04


   Last Admin: 10/05/21 21:33 Dose:  40 mg


   Documented by: 


Sodium Chloride (Normal Saline)  100 mls @ 60 mls/min IV ASDIRECTED [facility]


   Last Admin: 10/04/21 18:12 Dose:  60 mls/min


   Documented by: 


Sodium Chloride (Normal Saline)  1,000 mls @ 250 mls/hr IV ASDIRECTED [facility]


   Last Admin: 10/04/21 22:14 Dose:  250 mls/hr


   Documented by: 


Ceftriaxone Sodium 1 gm/ (Sodium Chloride)  100 mls @ 200 mls/hr IV ONETIME ONE


   Stop: 10/05/21 09:25


   Last Admin: 10/05/21 09:20 Dose:  200 mls/hr


   Documented by: 


Vancomycin HCl 1 gm/ Sodium (Chloride)  250 mls @ 250 mls/hr IV Q18H [facility]


   Last Admin: 10/05/21 14:26 Dose:  Not Given


   Documented by: 


Vancomycin HCl 1 gm/ Sodium (Chloride)  250 mls @ 250 mls/hr IV Q18H [facility]


   Last Admin: 10/05/21 15:37 Dose:  Not Given


   Documented by: 


Piperacillin Sod/Tazobactam (Sod 4.5 gm/ Sodium Chloride)  100 mls @ 200 mls/hr 

IV ONETIME ONE


   Stop: 10/05/21 14:59


   Last Admin: 10/05/21 14:19 Dose:  200 mls/hr


   Documented by: 


Sodium Chloride (Normal Saline)  1,000 mls @ 100 mls/hr IV ASDIRECTED [facility]


Iopamidol (Iopamidol 755 Mg/Ml 100 Ml Bottle)  100 ml IVPUSH ONETIME ONE


   Stop: 10/04/21 18:12


   Last Admin: 10/04/21 18:12 Dose:  100 ml


   Documented by: 


Methylprednisolone Sodium Succinate (Methylprednisolone Sodium Succinate 125 

Mg/2 Ml Sdv)  125 mg IVPUSH ONETIME ONE


   Stop: 10/05/21 08:21


   Last Admin: 10/05/21 08:25 Dose:  125 mg


   Documented by: 


Potassium Chloride (Potassium Chloride 20 Meq Tab.Er)  20 meq PO ONETIME ONE


   Stop: 10/06/21 07:21


   Last Admin: 10/06/21 08:37 Dose:  20 meq


   Documented by: 


Sodium Chloride (Sodium Chloride 0.9% 10 Ml Sdv)  10 ml FLUSH ONETIME ONE


   Stop: 10/04/21 18:12


   Last Admin: 10/04/21 20:31 Dose:  10 ml


   Documented by: 











- Patient Data


Lab Results Last 24 hrs: 


                         Laboratory Results - last 24 hr











  10/05/21 10/05/21 10/05/21 Range/Units





  09:36 14:30 15:00 


 


WBC     (3.98-10.04)  K/mm3


 


RBC     (3.98-5.22)  M/mm3


 


Hgb     (11.2-15.7)  gm/dl


 


Hct     (34.1-44.9)  %


 


MCV     (79.4-94.8)  fl


 


MCH     (25.6-32.2)  pg


 


MCHC     (32.2-35.5)  g/dl


 


RDW Std Deviation     (36.4-46.3)  fL


 


Plt Count     (182-369)  K/mm3


 


MPV     (9.4-12.3)  fl


 


Neut % (Auto)     (34.0-71.1)  %


 


Lymph % (Auto)     (19.3-51.7)  %


 


Mono % (Auto)     (4.7-12.5)  %


 


Eos % (Auto)     (0.7-5.8)  


 


Baso % (Auto)     (0.1-1.2)  %


 


Neut # (Auto)     (1.56-6.13)  K/mm3


 


Lymph # (Auto)     (1.18-3.74)  K/mm3


 


Mono # (Auto)     (0.24-0.36)  K/mm3


 


Eos # (Auto)     (0.04-0.36)  K/mm3


 


Baso # (Auto)     (0.01-0.08)  K/mm3


 


Manual Slide Review     


 


Sodium    135 L  (136-145)  mEq/L


 


Potassium    3.5  (3.5-5.1)  mEq/L


 


Chloride    100  ()  mEq/L


 


Carbon Dioxide    21  (21-32)  mEq/L


 


Anion Gap    17.5 H  (5-15)  


 


BUN    16  (7-18)  mg/dL


 


Creatinine    0.5 L  (0.55-1.02)  mg/dL


 


Est Cr Clr Drug Dosing    68.84  mL/min


 


Estimated GFR (MDRD)    > 60  (>60)  mL/min


 


BUN/Creatinine Ratio    32.0 H  (14-18)  


 


Glucose    146 H  (70-99)  mg/dL


 


Lactic Acid     (0.4-2.0)  mmol/L


 


Calcium    8.5  (8.5-10.1)  mg/dL


 


Magnesium    2.0  (1.8-2.4)  mg/dL


 


Total Bilirubin     (0.2-1.0)  mg/dL


 


AST     (15-37)  U/L


 


ALT     (14-59)  U/L


 


Alkaline Phosphatase     ()  U/L


 


Troponin I     (0.00-0.056)  ng/mL


 


C-Reactive Protein    31.5 H*  (<1.0)  mg/dL


 


NT-Pro-B Natriuret Pep     (0-450)  pg/mL


 


Total Protein     (6.4-8.2)  g/dl


 


Albumin     (3.4-5.0)  g/dl


 


Globulin     gm/dL


 


Albumin/Globulin Ratio     (1-2)  


 


Procalcitonin  0.29 H    ng/mL


 


Adenovirus (PCR)   Not detected   (Not Detected)  


 


B. pertussis DNA (PCR)   Not detected   (Not Detected)  


 


B.parapertussis DNA PCR   Not detected   (Not Detected)  


 


C. pneumoniae DNA (PCR)   Not detected   (Not Detected)  


 


Coronavirus OC43 (PCR)   Not detected   (Not Detected)  


 


Coronavirus HKU1 (PCR)   Not detected   (Not Detected)  


 


Coronavirus 229E (PCR)   Not detected   (Not Detected)  


 


Coronavirus NL63 (PCR)   Not detected   (Not Detected)  


 


Human Metapneumovir PCR   Not detected   (Not Detected)  


 


Influenza A (RT-PCR)   Not detected   (Not Detected)  


 


Influenza B (RT-PCR)   Not detected   (Not Detected)  


 


M. pneumoniae (PCR)   Not detected   (Not Detected)  


 


Parainfluenza 1 (PCR)   Not detected   (Not Detected)  


 


Parainfluenza 2 (PCR)   Not detected   (Not Detected)  


 


Parainfluenza 3 (PCR)   Not detected   (Not Detected)  


 


Parainfluenza 4 (PCR)   Not detected   (Not Detected)  


 


RSV (PCR)   Not detected   (Not Detected)  


 


Entero/Rhino (PCR)   Not detected   (Not Detected)  


 


SARS-CoV-2 (PCR)   Detected H   (Not Detected)  














  10/05/21 10/05/21 10/06/21 Range/Units





  15:00 19:15 04:44 


 


WBC    7.23  (3.98-10.04)  K/mm3


 


RBC    3.73 L  (3.98-5.22)  M/mm3


 


Hgb    11.3  (11.2-15.7)  gm/dl


 


Hct    33.4 L  (34.1-44.9)  %


 


MCV    89.5  (79.4-94.8)  fl


 


MCH    30.3  (25.6-32.2)  pg


 


MCHC    33.8  (32.2-35.5)  g/dl


 


RDW Std Deviation    44.8  (36.4-46.3)  fL


 


Plt Count    261  (182-369)  K/mm3


 


MPV    10.5  (9.4-12.3)  fl


 


Neut % (Auto)    83.6 H  (34.0-71.1)  %


 


Lymph % (Auto)    8.9 L  (19.3-51.7)  %


 


Mono % (Auto)    7.1  (4.7-12.5)  %


 


Eos % (Auto)    0 L  (0.7-5.8)  


 


Baso % (Auto)    0.1  (0.1-1.2)  %


 


Neut # (Auto)    6.05  (1.56-6.13)  K/mm3


 


Lymph # (Auto)    0.64 L  (1.18-3.74)  K/mm3


 


Mono # (Auto)    0.51 H  (0.24-0.36)  K/mm3


 


Eos # (Auto)    0.00 L  (0.04-0.36)  K/mm3


 


Baso # (Auto)    0.01  (0.01-0.08)  K/mm3


 


Manual Slide Review    Normal smear  


 


Sodium     (136-145)  mEq/L


 


Potassium     (3.5-5.1)  mEq/L


 


Chloride     ()  mEq/L


 


Carbon Dioxide     (21-32)  mEq/L


 


Anion Gap     (5-15)  


 


BUN     (7-18)  mg/dL


 


Creatinine     (0.55-1.02)  mg/dL


 


Est Cr Clr Drug Dosing     mL/min


 


Estimated GFR (MDRD)     (>60)  mL/min


 


BUN/Creatinine Ratio     (14-18)  


 


Glucose     (70-99)  mg/dL


 


Lactic Acid  1.0    (0.4-2.0)  mmol/L


 


Calcium     (8.5-10.1)  mg/dL


 


Magnesium     (1.8-2.4)  mg/dL


 


Total Bilirubin     (0.2-1.0)  mg/dL


 


AST     (15-37)  U/L


 


ALT     (14-59)  U/L


 


Alkaline Phosphatase     ()  U/L


 


Troponin I     (0.00-0.056)  ng/mL


 


C-Reactive Protein     (<1.0)  mg/dL


 


NT-Pro-B Natriuret Pep   4148 H   (0-450)  pg/mL


 


Total Protein     (6.4-8.2)  g/dl


 


Albumin     (3.4-5.0)  g/dl


 


Globulin     gm/dL


 


Albumin/Globulin Ratio     (1-2)  


 


Procalcitonin     ng/mL


 


Adenovirus (PCR)     (Not Detected)  


 


B. pertussis DNA (PCR)     (Not Detected)  


 


B.parapertussis DNA PCR     (Not Detected)  


 


C. pneumoniae DNA (PCR)     (Not Detected)  


 


Coronavirus OC43 (PCR)     (Not Detected)  


 


Coronavirus HKU1 (PCR)     (Not Detected)  


 


Coronavirus 229E (PCR)     (Not Detected)  


 


Coronavirus NL63 (PCR)     (Not Detected)  


 


Human Metapneumovir PCR     (Not Detected)  


 


Influenza A (RT-PCR)     (Not Detected)  


 


Influenza B (RT-PCR)     (Not Detected)  


 


M. pneumoniae (PCR)     (Not Detected)  


 


Parainfluenza 1 (PCR)     (Not Detected)  


 


Parainfluenza 2 (PCR)     (Not Detected)  


 


Parainfluenza 3 (PCR)     (Not Detected)  


 


Parainfluenza 4 (PCR)     (Not Detected)  


 


RSV (PCR)     (Not Detected)  


 


Entero/Rhino (PCR)     (Not Detected)  


 


SARS-CoV-2 (PCR)     (Not Detected)  














  10/06/21 Range/Units





  04:44 


 


WBC   (3.98-10.04)  K/mm3


 


RBC   (3.98-5.22)  M/mm3


 


Hgb   (11.2-15.7)  gm/dl


 


Hct   (34.1-44.9)  %


 


MCV   (79.4-94.8)  fl


 


MCH   (25.6-32.2)  pg


 


MCHC   (32.2-35.5)  g/dl


 


RDW Std Deviation   (36.4-46.3)  fL


 


Plt Count   (182-369)  K/mm3


 


MPV   (9.4-12.3)  fl


 


Neut % (Auto)   (34.0-71.1)  %


 


Lymph % (Auto)   (19.3-51.7)  %


 


Mono % (Auto)   (4.7-12.5)  %


 


Eos % (Auto)   (0.7-5.8)  


 


Baso % (Auto)   (0.1-1.2)  %


 


Neut # (Auto)   (1.56-6.13)  K/mm3


 


Lymph # (Auto)   (1.18-3.74)  K/mm3


 


Mono # (Auto)   (0.24-0.36)  K/mm3


 


Eos # (Auto)   (0.04-0.36)  K/mm3


 


Baso # (Auto)   (0.01-0.08)  K/mm3


 


Manual Slide Review   


 


Sodium  137  (136-145)  mEq/L


 


Potassium  3.3 L  (3.5-5.1)  mEq/L


 


Chloride  103  ()  mEq/L


 


Carbon Dioxide  24  (21-32)  mEq/L


 


Anion Gap  13.3  (5-15)  


 


BUN  19 H  (7-18)  mg/dL


 


Creatinine  0.6  (0.55-1.02)  mg/dL


 


Est Cr Clr Drug Dosing  57.37  mL/min


 


Estimated GFR (MDRD)  > 60  (>60)  mL/min


 


BUN/Creatinine Ratio  31.7 H  (14-18)  


 


Glucose  146 H  (70-99)  mg/dL


 


Lactic Acid   (0.4-2.0)  mmol/L


 


Calcium  8.4 L  (8.5-10.1)  mg/dL


 


Magnesium  1.8  (1.8-2.4)  mg/dL


 


Total Bilirubin  0.5  (0.2-1.0)  mg/dL


 


AST  26  (15-37)  U/L


 


ALT  26  (14-59)  U/L


 


Alkaline Phosphatase  90  ()  U/L


 


Troponin I  0.031  (0.00-0.056)  ng/mL


 


C-Reactive Protein  24.2 H*  (<1.0)  mg/dL


 


NT-Pro-B Natriuret Pep   (0-450)  pg/mL


 


Total Protein  6.5  (6.4-8.2)  g/dl


 


Albumin  1.8 L  (3.4-5.0)  g/dl


 


Globulin  4.7  gm/dL


 


Albumin/Globulin Ratio  0.4 L  (1-2)  


 


Procalcitonin   ng/mL


 


Adenovirus (PCR)   (Not Detected)  


 


B. pertussis DNA (PCR)   (Not Detected)  


 


B.parapertussis DNA PCR   (Not Detected)  


 


C. pneumoniae DNA (PCR)   (Not Detected)  


 


Coronavirus OC43 (PCR)   (Not Detected)  


 


Coronavirus HKU1 (PCR)   (Not Detected)  


 


Coronavirus 229E (PCR)   (Not Detected)  


 


Coronavirus NL63 (PCR)   (Not Detected)  


 


Human Metapneumovir PCR   (Not Detected)  


 


Influenza A (RT-PCR)   (Not Detected)  


 


Influenza B (RT-PCR)   (Not Detected)  


 


M. pneumoniae (PCR)   (Not Detected)  


 


Parainfluenza 1 (PCR)   (Not Detected)  


 


Parainfluenza 2 (PCR)   (Not Detected)  


 


Parainfluenza 3 (PCR)   (Not Detected)  


 


Parainfluenza 4 (PCR)   (Not Detected)  


 


RSV (PCR)   (Not Detected)  


 


Entero/Rhino (PCR)   (Not Detected)  


 


SARS-CoV-2 (PCR)   (Not Detected)  











Result Diagrams: 


                                 10/06/21 04:44





                                 10/06/21 04:44


Robert Results Last 24 hrs: 


                                  Microbiology











 10/04/21 23:30 Streptococcus pneumoniae Antigen (M - Final





 Urine 


 


 10/04/21 23:30 Urine Culture - Preliminary





 Urine    Escherichia Coli














Sepsis Event Note





- Focused Exam


Vital Signs: 


                                   Vital Signs











  Temp Pulse Resp BP Pulse Ox Pulse Ox


 


 10/06/21 15:10       91 L


 


 10/06/21 14:59       94 L


 


 10/06/21 09:56       89 L


 


 10/06/21 07:53  97.5 F  95  22 H  126/86  83 L 


 


 10/06/21 06:39       89 L


 


 10/06/21 06:01  97.5 F  90  24 H  123/81  88 L 


 


 10/06/21 03:52  97.5 F  94  26 H  144/85 H  89 L 














- Problem List Review


Problem List Initiated/Reviewed/Updated: Yes





- My Orders


Last 24 Hours: 


My Active Orders





10/05/21 21:00


Potassium Chloride [Klor-Con 10]   10 meq PO TID 





10/06/21 07:00


Furosemide [Lasix]   20 mg IVPUSH TIDMEALS 














- Plan


Plan:: 


Patient was seen and examined, agree with assessment and plan.  Patient has had 

relief with diuresis.  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 

secondary to Covid.  The echo shows very mild changes.


CVS: Regular rate and rhythm


Abdominal: Soft nontender


Extremity: No edema


Respiratory: Crackles mild, clear with cough

## 2021-10-07 RX ADMIN — POTASSIUM CHLORIDE SCH MEQ: 750 TABLET, FILM COATED, EXTENDED RELEASE ORAL at 14:26

## 2021-10-07 RX ADMIN — POTASSIUM CHLORIDE SCH MEQ: 750 TABLET, FILM COATED, EXTENDED RELEASE ORAL at 22:00

## 2021-10-07 RX ADMIN — POTASSIUM CHLORIDE SCH MEQ: 750 TABLET, FILM COATED, EXTENDED RELEASE ORAL at 09:13

## 2021-10-07 NOTE — PCM.PN
- General Info


Date of Service: 10/07/21


Admission Dx/Problem (Free Text): 


                           Admission Diagnosis/Problem





Admission Diagnosis/Problem      Hypoxia








Functional Status: Reports: Pain Controlled, Tolerating Diet, Ambulating, 

Urinating, Incentive Spirometry.  Denies: New Symptoms





- Review of Systems


General: Reports: Weakness.  Denies: Fever, Fatigue, Malaise, Chills


HEENT: Reports: No Symptoms.  Denies: Headaches, Sore Throat


Pulmonary: Reports: Shortness of Breath, Cough, Sputum.  Denies: Pleuritic Chest

Pain, Wheezing


Cardiovascular: Reports: Dyspnea on Exertion.  Denies: Chest Pain, Palpitations,

Edema


Gastrointestinal: Reports: No Symptoms.  Denies: Abdominal Pain, Constipation, 

Diarrhea, Nausea, Vomiting


Genitourinary: Reports: No Symptoms.  Denies: Pain


Musculoskeletal: Reports: No Symptoms


Skin: Reports: No Symptoms.  Denies: Cyanosis


Neurological: Reports: No Symptoms, Weakness.  Denies: Confusion, Headache, 

Numbness, Pre-Existing Deficit, Syncope, Tingling


Psychiatric: Reports: No Symptoms





- Patient Data


Vitals - Most Recent: 


                                Last Vital Signs











Temp  98.1 F   10/07/21 06:37


 


Pulse  95   10/07/21 06:37


 


Resp  24 H  10/07/21 06:37


 


BP  124/79   10/07/21 06:37


 


Pulse Ox  92 L  10/07/21 06:37











Weight - Most Recent: 133 lb 6.4 oz


I&O - Last 24 Hours: 


                                 Intake & Output











 10/06/21 10/07/21 10/07/21





 22:59 06:59 14:59


 


Intake Total 750 350 


 


Output Total 1250 150 


 


Balance -500 200 











Lab Results Last 24 Hours: 


                         Laboratory Results - last 24 hr











  10/05/21 10/06/21 10/07/21 Range/Units





  14:30 04:44 04:45 


 


WBC    8.94  (3.98-10.04)  K/mm3


 


RBC    3.69 L  (3.98-5.22)  M/mm3


 


Hgb    11.1 L  (11.2-15.7)  gm/dl


 


Hct    33.4 L  (34.1-44.9)  %


 


MCV    90.5  (79.4-94.8)  fl


 


MCH    30.1  (25.6-32.2)  pg


 


MCHC    33.2  (32.2-35.5)  g/dl


 


RDW Std Deviation    46.0  (36.4-46.3)  fL


 


Plt Count    313  (182-369)  K/mm3


 


MPV    10.0  (9.4-12.3)  fl


 


Neut % (Auto)    77.7 H  (34.0-71.1)  %


 


Lymph % (Auto)    10.6 L  (19.3-51.7)  %


 


Mono % (Auto)    11.3  (4.7-12.5)  %


 


Eos % (Auto)    0 L  (0.7-5.8)  


 


Baso % (Auto)    0.1  (0.1-1.2)  %


 


Neut # (Auto)    6.94 H  (1.56-6.13)  K/mm3


 


Lymph # (Auto)    0.95 L  (1.18-3.74)  K/mm3


 


Mono # (Auto)    1.01 H  (0.24-0.36)  K/mm3


 


Eos # (Auto)    0.00 L  (0.04-0.36)  K/mm3


 


Baso # (Auto)    0.01  (0.01-0.08)  K/mm3


 


Manual Slide Review   Normal smear   


 


Sodium     (136-145)  mEq/L


 


Potassium     (3.5-5.1)  mEq/L


 


Chloride     ()  mEq/L


 


Carbon Dioxide     (21-32)  mEq/L


 


Anion Gap     (5-15)  


 


BUN     (7-18)  mg/dL


 


Creatinine     (0.55-1.02)  mg/dL


 


Est Cr Clr Drug Dosing     mL/min


 


Estimated GFR (MDRD)     (>60)  mL/min


 


BUN/Creatinine Ratio     (14-18)  


 


Glucose     (70-99)  mg/dL


 


Calcium     (8.5-10.1)  mg/dL


 


Magnesium     (1.8-2.4)  mg/dL


 


Total Bilirubin     (0.2-1.0)  mg/dL


 


AST     (15-37)  U/L


 


ALT     (14-59)  U/L


 


Alkaline Phosphatase     ()  U/L


 


C-Reactive Protein     (<1.0)  mg/dL


 


Total Protein     (6.4-8.2)  g/dl


 


Albumin     (3.4-5.0)  g/dl


 


Globulin     gm/dL


 


Albumin/Globulin Ratio     (1-2)  


 


Adenovirus (PCR)  Not detected    (Not Detected)  


 


B. pertussis DNA (PCR)  Not detected    (Not Detected)  


 


B.parapertussis DNA PCR  Not detected    (Not Detected)  


 


C. pneumoniae DNA (PCR)  Not detected    (Not Detected)  


 


Coronavirus OC43 (PCR)  Not detected    (Not Detected)  


 


Coronavirus HKU1 (PCR)  Not detected    (Not Detected)  


 


Coronavirus 229E (PCR)  Not detected    (Not Detected)  


 


Coronavirus NL63 (PCR)  Not detected    (Not Detected)  


 


Human Metapneumovir PCR  Not detected    (Not Detected)  


 


Influenza A (RT-PCR)  Not detected    (Not Detected)  


 


Influenza B (RT-PCR)  Not detected    (Not Detected)  


 


M. pneumoniae (PCR)  Not detected    (Not Detected)  


 


Parainfluenza 1 (PCR)  Not detected    (Not Detected)  


 


Parainfluenza 2 (PCR)  Not detected    (Not Detected)  


 


Parainfluenza 3 (PCR)  Not detected    (Not Detected)  


 


Parainfluenza 4 (PCR)  Not detected    (Not Detected)  


 


RSV (PCR)  Not detected    (Not Detected)  


 


Entero/Rhino (PCR)  Not detected    (Not Detected)  


 


SARS-CoV-2 (PCR)  Detected H    (Not Detected)  














  10/07/21 Range/Units





  04:45 


 


WBC   (3.98-10.04)  K/mm3


 


RBC   (3.98-5.22)  M/mm3


 


Hgb   (11.2-15.7)  gm/dl


 


Hct   (34.1-44.9)  %


 


MCV   (79.4-94.8)  fl


 


MCH   (25.6-32.2)  pg


 


MCHC   (32.2-35.5)  g/dl


 


RDW Std Deviation   (36.4-46.3)  fL


 


Plt Count   (182-369)  K/mm3


 


MPV   (9.4-12.3)  fl


 


Neut % (Auto)   (34.0-71.1)  %


 


Lymph % (Auto)   (19.3-51.7)  %


 


Mono % (Auto)   (4.7-12.5)  %


 


Eos % (Auto)   (0.7-5.8)  


 


Baso % (Auto)   (0.1-1.2)  %


 


Neut # (Auto)   (1.56-6.13)  K/mm3


 


Lymph # (Auto)   (1.18-3.74)  K/mm3


 


Mono # (Auto)   (0.24-0.36)  K/mm3


 


Eos # (Auto)   (0.04-0.36)  K/mm3


 


Baso # (Auto)   (0.01-0.08)  K/mm3


 


Manual Slide Review   


 


Sodium  140  (136-145)  mEq/L


 


Potassium  3.7  (3.5-5.1)  mEq/L


 


Chloride  103  ()  mEq/L


 


Carbon Dioxide  28  (21-32)  mEq/L


 


Anion Gap  12.7  (5-15)  


 


BUN  25 H  (7-18)  mg/dL


 


Creatinine  0.8  (0.55-1.02)  mg/dL


 


Est Cr Clr Drug Dosing  43.03  mL/min


 


Estimated GFR (MDRD)  > 60  (>60)  mL/min


 


BUN/Creatinine Ratio  31.3 H  (14-18)  


 


Glucose  107 H  (70-99)  mg/dL


 


Calcium  8.6  (8.5-10.1)  mg/dL


 


Magnesium  1.9  (1.8-2.4)  mg/dL


 


Total Bilirubin  0.5  (0.2-1.0)  mg/dL


 


AST  24  (15-37)  U/L


 


ALT  29  (14-59)  U/L


 


Alkaline Phosphatase  90  ()  U/L


 


C-Reactive Protein  12.5 H*  (<1.0)  mg/dL


 


Total Protein  6.3 L  (6.4-8.2)  g/dl


 


Albumin  1.8 L  (3.4-5.0)  g/dl


 


Globulin  4.5  gm/dL


 


Albumin/Globulin Ratio  0.4 L  (1-2)  


 


Adenovirus (PCR)   (Not Detected)  


 


B. pertussis DNA (PCR)   (Not Detected)  


 


B.parapertussis DNA PCR   (Not Detected)  


 


C. pneumoniae DNA (PCR)   (Not Detected)  


 


Coronavirus OC43 (PCR)   (Not Detected)  


 


Coronavirus HKU1 (PCR)   (Not Detected)  


 


Coronavirus 229E (PCR)   (Not Detected)  


 


Coronavirus NL63 (PCR)   (Not Detected)  


 


Human Metapneumovir PCR   (Not Detected)  


 


Influenza A (RT-PCR)   (Not Detected)  


 


Influenza B (RT-PCR)   (Not Detected)  


 


M. pneumoniae (PCR)   (Not Detected)  


 


Parainfluenza 1 (PCR)   (Not Detected)  


 


Parainfluenza 2 (PCR)   (Not Detected)  


 


Parainfluenza 3 (PCR)   (Not Detected)  


 


Parainfluenza 4 (PCR)   (Not Detected)  


 


RSV (PCR)   (Not Detected)  


 


Entero/Rhino (PCR)   (Not Detected)  


 


SARS-CoV-2 (PCR)   (Not Detected)  











Robert Results Last 24 Hours: 


                                  Microbiology











 10/04/21 23:30 Streptococcus pneumoniae Antigen (M - Final





 Urine 


 


 10/04/21 23:30 Urine Culture - Preliminary





 Urine    Escherichia Coli











Med Orders - Current: 


                               Current Medications





Albuterol (Albuterol 0.083% 2.5 Mg/3 Ml Neb Soln)  2.5 mg NEB Q2H PRN


   PRN Reason: Shortness Of Breath/wheezing


Albuterol/Ipratropium (Albuterol/Ipratropium 3.0-0.5 Mg/3 Ml Neb Soln)  3 ml NEB

[facility]IDRT [facility]


   Last Admin: 10/07/21 05:55 Dose:  3 ml


   Documented by: 


Apixaban (Apixaban 5 Mg Tab)  10 mg PO BID [facility]


   Stop: 10/12/21 21:01


   Last Admin: 10/06/21 21:47 Dose:  10 mg


   Documented by: 


Docusate Sodium (Docusate Sodium 100 Mg Cap)  100 mg PO Q12H PRN


   PRN Reason: Constipation


Furosemide (Furosemide 20 Mg/2 Ml Vial)  20 mg IVPUSH TIDMEALS [facility]


   Last Admin: 10/07/21 06:33 Dose:  20 mg


   Documented by: 


Guaifenesin (Guaifenesin 600 Mg Tab.Er)  600 mg PO BID [facility]


   Last Admin: 10/06/21 21:48 Dose:  600 mg


   Documented by: 


Piperacillin Sod/Tazobactam (Sod 4.5 gm/ Sodium Chloride)  100 mls @ 25 mls/hr 

IV Q8H [facility]


   Last Admin: 10/07/21 06:33 Dose:  25 mls/hr


   Documented by: 


Vancomycin HCl 1 gm/ Sodium (Chloride)  250 mls @ 250 mls/hr IV Q18H [facility]


   Last Admin: 10/07/21 02:28 Dose:  250 mls/hr


   Documented by: 


Ondansetron HCl (Ondansetron 4 Mg/2 Ml Sdv)  4 mg IV Q6H PRN


   PRN Reason: Nausea/Vomiting


Potassium Chloride (Potassium Chloride 10 Meq Tab.Er)  10 meq PO TID [facility]


   Last Admin: 10/06/21 21:47 Dose:  10 meq


   Documented by: 


Sodium Chloride (Sodium Chloride 0.9% 10 Ml Syringe)  10 ml FLUSH ASDIRECTED PRN


   PRN Reason: Keep Vein Open


   Last Admin: 10/04/21 18:12 Dose:  10 ml


   Documented by: 


Vancomycin HCl (Pharmacy To Dose - Vancomycin)  1 dose .XX ASDIRECTED [facility]





Discontinued Medications





Albuterol (Albuterol 0.083% 2.5 Mg/3 Ml Neb Soln)  2.5 mg NEB ONETIME ONE


   Stop: 10/04/21 15:19


   Last Admin: 10/04/21 15:47 Dose:  2.5 mg


   Documented by: 


Albuterol/Ipratropium (Albuterol/Ipratropium 3.0-0.5 Mg/3 Ml Neb Soln)  3 ml NEB

ONETIME ONE


   Stop: 10/05/21 08:21


   Last Admin: 10/05/21 08:37 Dose:  3 ml


   Documented by: 


Enoxaparin Sodium (Enoxaparin 40 Mg/0.4 Ml Syringe)  40 mg SUBCUT DAILY [facility]


   Last Admin: 10/05/21 14:49 Dose:  40 mg


   Documented by: 


Enoxaparin Sodium (Enoxaparin 60 Mg/0.6 Ml Syringe)  20 mg SUBCUT ONETIME ONE


   Stop: 10/05/21 16:31


   Last Admin: 10/05/21 16:53 Dose:  20 mg


   Documented by: 


Furosemide (Furosemide 40 Mg/4 Ml Vial)  40 mg IVPUSH NOW ONE


   Stop: 10/05/21 20:04


   Last Admin: 10/05/21 21:33 Dose:  40 mg


   Documented by: 


Sodium Chloride (Normal Saline)  100 mls @ 60 mls/min IV ASDIRECTED [facility]


   Last Admin: 10/04/21 18:12 Dose:  60 mls/min


   Documented by: 


Sodium Chloride (Normal Saline)  1,000 mls @ 250 mls/hr IV ASDIRECTED [facility]


   Last Admin: 10/04/21 22:14 Dose:  250 mls/hr


   Documented by: 


Ceftriaxone Sodium 1 gm/ (Sodium Chloride)  100 mls @ 200 mls/hr IV ONETIME ONE


   Stop: 10/05/21 09:25


   Last Admin: 10/05/21 09:20 Dose:  200 mls/hr


   Documented by: 


Vancomycin HCl 1 gm/ Sodium (Chloride)  250 mls @ 250 mls/hr IV Q18H [facility]


   Last Admin: 10/05/21 14:26 Dose:  Not Given


   Documented by: 


Vancomycin HCl 1 gm/ Sodium (Chloride)  250 mls @ 250 mls/hr IV Q18H [facility]


   Last Admin: 10/05/21 15:37 Dose:  Not Given


   Documented by: 


Piperacillin Sod/Tazobactam (Sod 4.5 gm/ Sodium Chloride)  100 mls @ 200 mls/hr 

IV ONETIME ONE


   Stop: 10/05/21 14:59


   Last Admin: 10/05/21 14:19 Dose:  200 mls/hr


   Documented by: 


Sodium Chloride (Normal Saline)  1,000 mls @ 100 mls/hr IV ASDIRECTED [facility]


Iopamidol (Iopamidol 755 Mg/Ml 100 Ml Bottle)  100 ml IVPUSH ONETIME ONE


   Stop: 10/04/21 18:12


   Last Admin: 10/04/21 18:12 Dose:  100 ml


   Documented by: 


Methylprednisolone Sodium Succinate (Methylprednisolone Sodium Succinate 125 

Mg/2 Ml Sdv)  125 mg IVPUSH ONETIME ONE


   Stop: 10/05/21 08:21


   Last Admin: 10/05/21 08:25 Dose:  125 mg


   Documented by: 


Potassium Chloride (Potassium Chloride 20 Meq Tab.Er)  20 meq PO ONETIME ONE


   Stop: 10/06/21 07:21


   Last Admin: 10/06/21 08:37 Dose:  20 meq


   Documented by: 


Sodium Chloride (Sodium Chloride 0.9% 10 Ml Sdv)  10 ml FLUSH ONETIME ONE


   Stop: 10/04/21 18:12


   Last Admin: 10/04/21 20:31 Dose:  10 ml


   Documented by: 











- Exam


Quality Assessment: Supplemental Oxygen (High flow 55 L with an FiO2 of 70%), 

DVT Prophylaxis


General: Alert, Oriented, Cooperative, No Acute Distress


HEENT: Pupils Equal, Pupils Reactive, Mucous Membr. Moist/Pink


Neck: Supple, Trachea Midline


Lungs: Decreased Breath Sounds, Crackles, Rhonchi.  No: Normal Respiratory 

Effort (Tachypnea), Wheezing


Cardiovascular: Regular Rate, Regular Rhythm


GI/Abdominal Exam: Normal Bowel Sounds, Soft, Non-Tender, No Distention


 (Female) Exam: Deferred


Back Exam: Normal Inspection, Full Range of Motion


Extremities: Normal Inspection, Normal Range of Motion, Non-Tender, No Pedal 

Edema, Normal Capillary Refill


Peripheral Pulses: 2+: Dorsalis Pedis (L), Dorsalis Pedis (R), 3+: Radial (L), 

Radial (R)


Skin: Warm, Dry, Intact


Neurological: No New Focal Deficit


Psy/Mental Status: Alert, Normal Affect, Normal Mood





- Patient Data


Lab Results Last 24 hrs: 


                         Laboratory Results - last 24 hr











  10/05/21 10/06/21 10/07/21 Range/Units





  14:30 04:44 04:45 


 


WBC    8.94  (3.98-10.04)  K/mm3


 


RBC    3.69 L  (3.98-5.22)  M/mm3


 


Hgb    11.1 L  (11.2-15.7)  gm/dl


 


Hct    33.4 L  (34.1-44.9)  %


 


MCV    90.5  (79.4-94.8)  fl


 


MCH    30.1  (25.6-32.2)  pg


 


MCHC    33.2  (32.2-35.5)  g/dl


 


RDW Std Deviation    46.0  (36.4-46.3)  fL


 


Plt Count    313  (182-369)  K/mm3


 


MPV    10.0  (9.4-12.3)  fl


 


Neut % (Auto)    77.7 H  (34.0-71.1)  %


 


Lymph % (Auto)    10.6 L  (19.3-51.7)  %


 


Mono % (Auto)    11.3  (4.7-12.5)  %


 


Eos % (Auto)    0 L  (0.7-5.8)  


 


Baso % (Auto)    0.1  (0.1-1.2)  %


 


Neut # (Auto)    6.94 H  (1.56-6.13)  K/mm3


 


Lymph # (Auto)    0.95 L  (1.18-3.74)  K/mm3


 


Mono # (Auto)    1.01 H  (0.24-0.36)  K/mm3


 


Eos # (Auto)    0.00 L  (0.04-0.36)  K/mm3


 


Baso # (Auto)    0.01  (0.01-0.08)  K/mm3


 


Manual Slide Review   Normal smear   


 


Sodium     (136-145)  mEq/L


 


Potassium     (3.5-5.1)  mEq/L


 


Chloride     ()  mEq/L


 


Carbon Dioxide     (21-32)  mEq/L


 


Anion Gap     (5-15)  


 


BUN     (7-18)  mg/dL


 


Creatinine     (0.55-1.02)  mg/dL


 


Est Cr Clr Drug Dosing     mL/min


 


Estimated GFR (MDRD)     (>60)  mL/min


 


BUN/Creatinine Ratio     (14-18)  


 


Glucose     (70-99)  mg/dL


 


Calcium     (8.5-10.1)  mg/dL


 


Magnesium     (1.8-2.4)  mg/dL


 


Total Bilirubin     (0.2-1.0)  mg/dL


 


AST     (15-37)  U/L


 


ALT     (14-59)  U/L


 


Alkaline Phosphatase     ()  U/L


 


C-Reactive Protein     (<1.0)  mg/dL


 


Total Protein     (6.4-8.2)  g/dl


 


Albumin     (3.4-5.0)  g/dl


 


Globulin     gm/dL


 


Albumin/Globulin Ratio     (1-2)  


 


Adenovirus (PCR)  Not detected    (Not Detected)  


 


B. pertussis DNA (PCR)  Not detected    (Not Detected)  


 


B.parapertussis DNA PCR  Not detected    (Not Detected)  


 


C. pneumoniae DNA (PCR)  Not detected    (Not Detected)  


 


Coronavirus OC43 (PCR)  Not detected    (Not Detected)  


 


Coronavirus HKU1 (PCR)  Not detected    (Not Detected)  


 


Coronavirus 229E (PCR)  Not detected    (Not Detected)  


 


Coronavirus NL63 (PCR)  Not detected    (Not Detected)  


 


Human Metapneumovir PCR  Not detected    (Not Detected)  


 


Influenza A (RT-PCR)  Not detected    (Not Detected)  


 


Influenza B (RT-PCR)  Not detected    (Not Detected)  


 


M. pneumoniae (PCR)  Not detected    (Not Detected)  


 


Parainfluenza 1 (PCR)  Not detected    (Not Detected)  


 


Parainfluenza 2 (PCR)  Not detected    (Not Detected)  


 


Parainfluenza 3 (PCR)  Not detected    (Not Detected)  


 


Parainfluenza 4 (PCR)  Not detected    (Not Detected)  


 


RSV (PCR)  Not detected    (Not Detected)  


 


Entero/Rhino (PCR)  Not detected    (Not Detected)  


 


SARS-CoV-2 (PCR)  Detected H    (Not Detected)  














  10/07/21 Range/Units





  04:45 


 


WBC   (3.98-10.04)  K/mm3


 


RBC   (3.98-5.22)  M/mm3


 


Hgb   (11.2-15.7)  gm/dl


 


Hct   (34.1-44.9)  %


 


MCV   (79.4-94.8)  fl


 


MCH   (25.6-32.2)  pg


 


MCHC   (32.2-35.5)  g/dl


 


RDW Std Deviation   (36.4-46.3)  fL


 


Plt Count   (182-369)  K/mm3


 


MPV   (9.4-12.3)  fl


 


Neut % (Auto)   (34.0-71.1)  %


 


Lymph % (Auto)   (19.3-51.7)  %


 


Mono % (Auto)   (4.7-12.5)  %


 


Eos % (Auto)   (0.7-5.8)  


 


Baso % (Auto)   (0.1-1.2)  %


 


Neut # (Auto)   (1.56-6.13)  K/mm3


 


Lymph # (Auto)   (1.18-3.74)  K/mm3


 


Mono # (Auto)   (0.24-0.36)  K/mm3


 


Eos # (Auto)   (0.04-0.36)  K/mm3


 


Baso # (Auto)   (0.01-0.08)  K/mm3


 


Manual Slide Review   


 


Sodium  140  (136-145)  mEq/L


 


Potassium  3.7  (3.5-5.1)  mEq/L


 


Chloride  103  ()  mEq/L


 


Carbon Dioxide  28  (21-32)  mEq/L


 


Anion Gap  12.7  (5-15)  


 


BUN  25 H  (7-18)  mg/dL


 


Creatinine  0.8  (0.55-1.02)  mg/dL


 


Est Cr Clr Drug Dosing  43.03  mL/min


 


Estimated GFR (MDRD)  > 60  (>60)  mL/min


 


BUN/Creatinine Ratio  31.3 H  (14-18)  


 


Glucose  107 H  (70-99)  mg/dL


 


Calcium  8.6  (8.5-10.1)  mg/dL


 


Magnesium  1.9  (1.8-2.4)  mg/dL


 


Total Bilirubin  0.5  (0.2-1.0)  mg/dL


 


AST  24  (15-37)  U/L


 


ALT  29  (14-59)  U/L


 


Alkaline Phosphatase  90  ()  U/L


 


C-Reactive Protein  12.5 H*  (<1.0)  mg/dL


 


Total Protein  6.3 L  (6.4-8.2)  g/dl


 


Albumin  1.8 L  (3.4-5.0)  g/dl


 


Globulin  4.5  gm/dL


 


Albumin/Globulin Ratio  0.4 L  (1-2)  


 


Adenovirus (PCR)   (Not Detected)  


 


B. pertussis DNA (PCR)   (Not Detected)  


 


B.parapertussis DNA PCR   (Not Detected)  


 


C. pneumoniae DNA (PCR)   (Not Detected)  


 


Coronavirus OC43 (PCR)   (Not Detected)  


 


Coronavirus HKU1 (PCR)   (Not Detected)  


 


Coronavirus 229E (PCR)   (Not Detected)  


 


Coronavirus NL63 (PCR)   (Not Detected)  


 


Human Metapneumovir PCR   (Not Detected)  


 


Influenza A (RT-PCR)   (Not Detected)  


 


Influenza B (RT-PCR)   (Not Detected)  


 


M. pneumoniae (PCR)   (Not Detected)  


 


Parainfluenza 1 (PCR)   (Not Detected)  


 


Parainfluenza 2 (PCR)   (Not Detected)  


 


Parainfluenza 3 (PCR)   (Not Detected)  


 


Parainfluenza 4 (PCR)   (Not Detected)  


 


RSV (PCR)   (Not Detected)  


 


Entero/Rhino (PCR)   (Not Detected)  


 


SARS-CoV-2 (PCR)   (Not Detected)  











Result Diagrams: 


                                 10/07/21 04:45





                                 10/07/21 04:45


Robert Results Last 24 hrs: 


                                  Microbiology











 10/04/21 23:30 Streptococcus pneumoniae Antigen (M - Final





 Urine 


 


 10/04/21 23:30 Urine Culture - Preliminary





 Urine    Escherichia Coli














Sepsis Event Note





- Evaluation


Sepsis Screening Result: Sepsis Risk





- Focused Exam


Vital Signs: 


                                   Vital Signs











  Temp Pulse Resp BP Pulse Ox Pulse Ox


 


 10/07/21 06:37  98.1 F  95  24 H  124/79  92 L 


 


 10/07/21 05:57       89 L


 


 10/06/21 20:29       96


 


 10/06/21 20:25       92 L


 


 10/06/21 19:38  97.7 F  112 H  26 H  140/95 H  89 L 














- Problem List & Annotations


(1) History of COVID-19


SNOMED Code(s): 658520507732097776, [id number]


   Code(s): Z86.16 - PERSONAL HISTORY OF COVID-19   Status: Chronic   Priority: 

Medium   Current Visit: Yes   





(2) Elevated d-dimer


SNOMED Code(s): 226743091


   Code(s): R79.89 - OTHER SPECIFIED ABNORMAL FINDINGS OF BLOOD CHEMISTRY   

Status: Acute   Priority: Medium   Current Visit: Yes   





(3) Elevated troponin


SNOMED Code(s): 222480246, 695796068, 559795666


   Code(s): R77.8 - OTHER SPECIFIED ABNORMALITIES OF PLASMA PROTEINS   Status: 

Resolved   Priority: High   Current Visit: Yes   





(4) Pneumonia


SNOMED Code(s): 155766477


   Code(s): J18.9 - PNEUMONIA, UNSPECIFIED ORGANISM   Status: Suspected   

Priority: High   Current Visit: Yes   


Qualifiers: 


   Pneumonia type: due to unspecified organism   Laterality: unspecified 

laterality   Lung location: unspecified part of lung   Qualified Code(s): J18.9 

- Pneumonia, unspecified organism   





(5) Dyspnea


SNOMED Code(s): 385924809


   Code(s): R06.00 - DYSPNEA, UNSPECIFIED   Status: Acute   Priority: High   

Current Visit: Yes   


Qualifiers: 


   Dyspnea type: unspecified   Qualified Code(s): R06.00 - Dyspnea, unspecified 

 





(6) Hypoxia


SNOMED Code(s): 101891922


   Code(s): R09.02 - HYPOXEMIA   Status: Acute   Priority: High   Current Visit:

Yes   





(7) UTI (urinary tract infection)


SNOMED Code(s): 07406798


   Code(s): N39.0 - URINARY TRACT INFECTION, SITE NOT SPECIFIED   Status: Acute 

 Priority: High   Current Visit: Yes   


Qualifiers: 


   Urinary tract infection type: site unspecified   Hematuria presence: without 

hematuria   Qualified Code(s): N39.0 - Urinary tract infection, site not 

specified   





(8) Elevated brain natriuretic peptide (BNP) level


SNOMED Code(s): 705633561, 935119131


   Code(s): R79.89 - OTHER SPECIFIED ABNORMAL FINDINGS OF BLOOD CHEMISTRY   

Status: Acute   Priority: High   Current Visit: Yes   





(9) On home oxygen therapy


SNOMED Code(s): 209490327746


   Code(s): Z99.81 - DEPENDENCE ON SUPPLEMENTAL OXYGEN   Status: Chronic   

Priority: Medium   Current Visit: Yes   





(10) GERD (gastroesophageal reflux disease)


SNOMED Code(s): 431024175


   Code(s): K21.9 - GASTRO-ESOPHAGEAL REFLUX DISEASE WITHOUT ESOPHAGITIS   

Status: Chronic   Priority: Low   Current Visit: No   


Qualifiers: 


   Esophagitis presence: esophagitis presence not specified   Qualified Code(s):

K21.9 - Gastro-esophageal reflux disease without esophagitis   





(11) Meniere disease


SNOMED Code(s): 61718329


   Code(s): H81.09 - MENIERE'S DISEASE, UNSPECIFIED EAR   Status: Chronic   

Priority: Low   Current Visit: No   


Qualifiers: 


   Laterality: unspecified laterality   Qualified Code(s): H81.09 - Meniere's 

disease, unspecified ear   





(12) DVT (deep venous thrombosis)


SNOMED Code(s): 837069195


   Code(s): I82.409 - ACUTE EMBOLISM AND THOMBOS UNSP DEEP VN UNSP LOWER 

EXTREMITY   Status: Acute   Priority: High   Current Visit: Yes   


Qualifiers: 


   DVT location: lower extremity   Affected thrombotic vein of extremity: 

unspecified vein of extremity   Chronicity: acute   Laterality: left   Qualified

Code(s): I82.402 - Acute embolism and thrombosis of unspecified deep veins of 

left lower extremity   





(13) Hypotension


SNOMED Code(s): 42402088


   Code(s): I95.9 - HYPOTENSION, UNSPECIFIED   Status: Resolved   Priority: High

  Current Visit: Yes   


Qualifiers: 


   Hypotension type: unspecified hypotension type   Qualified Code(s): I95.9 - 

Hypotension, unspecified   





(14) Acute and chronic respiratory failure


SNOMED Code(s): 20686029


   Code(s): J96.20 - ACUTE AND CHR RESP FAILURE, UNSP W HYPOXIA OR HYPERCAPNIA  

Status: Acute   Priority: High   Current Visit: Yes   


Qualifiers: 


   Respiratory failure complication: hypoxia   Qualified Code(s): J96.21 - Acute

and chronic respiratory failure with hypoxia   





(15) Myocarditis


SNOMED Code(s): 81375249


   Code(s): I51.4 - MYOCARDITIS, UNSPECIFIED   Status: Suspected   Priority: 

High   Current Visit: Yes   


Qualifiers: 


   Myocarditis type: infective   Infective myocarditis organism: viral   

Chronicity: acute   Qualified Code(s): I40.0 - Infective myocarditis   





(16) Hypokalemia


SNOMED Code(s): 40034238


   Code(s): E87.6 - HYPOKALEMIA   Status: Acute   Priority: High   Current 

Visit: Yes   





(17) Pulmonary fibrosis


SNOMED Code(s): 16623818


   Code(s): J84.10 - PULMONARY FIBROSIS, UNSPECIFIED   Status: Suspected   

Priority: High   Current Visit: Yes   





- Problem List Review


Problem List Initiated/Reviewed/Updated: Yes





- My Orders


Last 24 Hours: 


My Active Orders





10/06/21 09:00


Apixaban [Eliquis]   10 mg PO BID 





10/06/21 10:41


Intake and Output Strict [RC] ASDIRECTED 





10/08/21 05:11


C-REACTIVE PROTEIN [CHEM] AM 


CBC WITH AUTO DIFF [HEME] AM 


COMPREHENSIVE METABOLIC PN,CMP [CHEM] AM 


MAGNESIUM [CHEM] AM 





10/09/21 05:11


C-REACTIVE PROTEIN [CHEM] AM 


CBC WITH AUTO DIFF [HEME] AM 


COMPREHENSIVE METABOLIC PN,CMP [CHEM] AM 


MAGNESIUM [CHEM] AM 














- Assessment


Assessment:: 


Admission assessment - 10/5/2021


* 81-year-old female who presents to ED on 10/4/2021 with low oxygen saturations


* History of Mnire's disease, GERD, and prior Covid pneumonia


* Was hospitalized from 9/13/2021 through 9/27/2021 with COVID-19 pneumonia at 

  [facility] in [place]. 


* Discharged home on 4 L of oxygen with activity after completing 4 days of 

  remdesivir and 10 days of Decadron. 


* Of note patient was noted to have episodes of bradycardia and hypotension with

   heart rate in the 30s and 40s. She was noticed to have episodes of Mobitz typ

  e I AV block and 2.5-second sinus pauses. It was believed this was due to 

  remdesivir however this continued after stopping. Patient was to follow-up 

  with EP and have a 2-week cardiac monitor after discharge. 


* Prior to presenting to the ED she was noted to have saturations in the low 

  50s. 


* Patient's  increased her oxygen to 8 L via nasal cannula. 


* Denies any recent fever, chills, nausea, vomiting, diarrhea, urinary symptoms,

   or smoking history. 


* Of note patient did have acute cystitis with an E. coli UTI well in the 

  hospital in [place] and completed 5 days of Rocephin there. 


* 12-lead EKG was obtained showing a sinus tachycardia at 109 bpm with a LAFB 

  and very mild ST elevation in V2 and V3. 


* Placed on a nonrebreather mask which improved her saturations to 90 to 91%. 


* Noted to be dyspneic and only able to complete a few word sentences. 


* Labs are obtained:


   * WBC of 8.57. 


   * Hemoglobin 13.0. 


   * Hematocrit 30.5. 


   * Platelet 250,000. 


   * Neutrophils are elevated 75.7%. 


   * D-dimer is very high at 8.16. 


   * Sodium is low at 129. 


   * Potassium 3.8. 


   * Chloride 94. 


   * Carbon dioxide 24. 


   * Anion gap is 14.8. 


   * BUN is 15. Creatinine 0.6. GFR greater than 60. 


   * Glucose is 126. 


   * Calcium 8.7. 


   * Magnesium 1.8. 


   * Total bilirubin 0.6. 


   * AST is 36, ALT 31, alkaline phosphatase 86. 


   * Troponin is elevated at 0.127-->0.156-->0.067. 


   * CRP is very high at 29.3. 


   * Protein 7.1. 


   * Albumin 2.1. 


   * proBNP is 3117


   * UA is obtained and is mildly positive with slightly cloudy urine, 1+ 

     protein, 3+ ketones, 2+ occult blood, 1+ leukocyte esterase, 5-10 RBCs, 20-

     30 WBCs, rare WBC clumps, and moderate bacteria.


* ABG obtained in the left radial with a pH of 7.44. PCO2 of 30.9. PO2 of 58.0. 

  HCO3 of 20.5. O2 saturations 86%. Base excess is -2.3. Aa gradient is 15.0. 

  This is obtained while on nonrebreather at 15 L. 


* Facility was out of CPAP and BiPAP-> started on high flow O2 with saturations 

  in the low 90s. 


* CTA of the chest is obtained to rule out PE and shows:


   * 1. No findings of pulmonary embolism. 


   * 2. Minimal left-sided pleural effusion. 


   * 3. Diffuse emphysematous changes seen. Both lungs show diffuse increased 

     density uncertain how much of this represents diffuse fibrosis versus 

     possible superimposed pneumonia. Old comparison studies would be needed if 

     available. 


   * 4. 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 


   * 5. Other findings which are felt to be chronic as described above. 


* Chest x-ray shows scattered infiltrates bilaterally most prominent in the left

   lower lobe. 


* Plan was to transfer the patient for continued care due to elevated troponin 

  however [place] beds are noted to be available [place] or 

  [place]. 


* Noted to have a blood pressure of 80/61 and is given a 500 mill fluid bolus 

  over 2 hours. 


* Given 1 dose of Rocephin in the ED. She is also given 125 mg Solu-Medrol and a

   DuoNeb, which she reports greatly helped.


* Ultimately inpatient bed does open up at our facility and she is admitted to 

  the floor on telemetry for management of her hypoxia, suspected pneumonia, 

  elevated BNP, elevated troponin - likely demand ischemia, and questionable 

  UTI. 


* On floor bilateral lower extremity ultrasound shows thrombus within the left 

  mid and distal superficial, femoral, popliteal, posterior tibial, and peroneal

   veins.





10/6/2021


This is a 81 years old female post Covid infection who was hospitalized in 

[place] for approximately 2 weeks who presented to our ED with low saturations.

  On admission D-dimer was noted to be elevated however CTA was negative for any

 PE.  Lower extremity ultrasound did show a DVT in the left extremity and 

patient was started on 1 mg/kg Lovenox transitioning to 10 mg twice daily 

Eliquis today.  Patient has been requiring high flow 60 L and was weaned down to

 80% FiO2 today.  Unfortunately given patient's history of recent Covid 

infection CTA was not very helpful in differentiating pulmonary cause.  Given 

recent hospitalization there are concerns of a secondary bacterial infection.  

WBC has been WNL however neutrophils are elevated.  Procalcitonin was obtained 

and was 0.29.  Lactic acid yesterday was 1.0.  Patient was noted to be 

hypotensive in the emergency room and was given 2 fluid boluses.  She was also 

started on fluids over concerns of sepsis.  UA was positive and urine culture is

 growing out greater than 100,000 CFU's of gram-negative rods thus far.  She 

does have a history of a E. coli UTI treated with Rocephin in [place].  Patient

 denies any urinary symptoms.  proBNP was elevated.  Echocardiogram was obtained

 on 10/5/2021  And interpreted as: " 1.  The left ventricular internal cavity 

size is normal. 2.  Normal left ventricular systolic function. 3.  Left 

ventricular ejection fraction, by visual estimation, is 55 to 60%. 4.  No 

regional wall motion abnormalities. 5.  Mild proximal septal hypertrophy. 6.  

Aortic valve is tricuspid. 7.  Mild mitral valve regurgitation. 8.  Trace 

tricuspid valve regurgitation. 9.  Mild pulmonic valve regurgitation. 10.  The 

inferior vena cava is normal. 11.  The right ventricular systolic pressure is 

mildly elevated at 32.2 mmHg."  Patient was started on 20 mg IV push Lasix 3 

times daily yesterday and has had over 1 L output.  Potassium today is down to 

3.3 and this is being supplemented.  Sodium 137.  Carbon dioxide 24.  Anion gap 

is 13.3.  BUN is 19.  Creatinine 0.6.  GFR greater than 60.  Glucose is 146.  

Calcium 8.4.  Magnesium is 1.8.  Total bilirubin 0.5.  AST is 26, ALT 26, 

alkaline phosphatase 90.  Troponin today was down to 0.031.  CRP is down to 

24.2.  Albumin is down to 1.8.  Mycoplasma and strep pneumonia were both checked

 and were negative.  Respiratory viral panel was obtained and was negative 

however patient did return positive for Covid, which is unsurprising given the 

patient's recent known Covid infection.  We will continue current treatment 

plan.  It is felt it is prudent to continue IV antibiotics for now given 

patient's symptoms and lab results.  We will continue diuresis and Eliquis for 

DVT treatment.





10/7/2021


This is a 81-year-old female post Covid infection who was admitted for suspected

 pneumonia, pulmonary fibrosis, myocarditis, DVT, and apparent UTI.  Labs today 

show WBC of 8.94.  Hemoglobin is 11.1.  Neutrophils are elevated 77.7%.  Smear 

review feels slight toxic granulation and abnormal RBC morphology.  Sodium today

 is 140.  Potassium 3.7.  Chloride 103.  Carbon dioxide 20.  Anion gap is 12.7. 

 BUN is 25.  Creatinine 0.8.  GFR greater than 60.  Magnesium is 1.9.  Bilirubin

 0.5.  AST is 24, ALT 29, alkaline phosphatase is 90.  CRP is down to 12.5.  

Albumin is 1.8.  Patient does report that she feels quite a bit better today.  

She is down on high flow to 55 L with an FiO2 of 70% with saturations of upper 

80s to low 90s.  Urine culture continues to show greater than 100,000 CFU's of 

E. coli.  Sensitivities are pending.  Blood cultures have returned negative.  

Strep pneumonia is negative.  Discussed plan of care with Dr. Newsome who 

recommends patient receive 1 more dose of Lasix and then either discontinue or 

continue with 20 mg daily.  We will continue vancomycin and Zosyn due to 

concerns over possible pneumonia.  Unfortunately today patient reported that her

 insurance company tells her Eliquis will be $600 for 30-day supply.  She states

 she cannot afford this.  Apparently Xarelto is around the same pricing per case

 management.  We will therefore discontinue Eliquis start patient on 1 mg/kg 

Lovenox twice daily and start warfarin with pharmacy to dose.  Patient has had 

no fevers.  We will continue current treatment plan and hope to wean patient off

 of high flow.  Unknown length of stay due to severity of symptoms.





- Plan


Plan:: 


Pneumonia - suspected


Dyspnea


Hypoxia


History of COVID-19


On home oxygen therapy


Acute on chronic respiratory failure 


Pulmonary fibrosis - suspected 


* Droplet isolation (airborne/contact while on high flow O2)


* O2 as needed to keep saturation greater than 90%


* I-S/Acapella


* Given recent hospitalization we will start every 8 hours Zosyn and vancomycin 

  with pharmacy to dose


* High flow oxygen as directed


* Sputum culture if able to produce


* 4 times daily scheduled DuoNebs


* Every 2 hours as needed albuterol nebulizer


* Consult RT


* Telemetry


* Blood cultures negative thus far 


* Continuous pulse oximetry


* Mucinex BID


* Recommend PFT after recovery due to emphysematous changes noted in CTA


* Recommend pulmonology follow-up after discharge 





DVT


Elevated d-dimer


* CTA in ED negative for PE


* Discontinue Eliquis as patient reports $600 for 30 day supply


* Begin Warfarin with pharmacy to dose


* Start Lovenox 60mg (1mg/kg) BID as bridging 





Myocarditis - suspected 


Elevated troponin, resolved 


Elevated brain natriuretic peptide (BNP) level


* Troponin now WNL


* Echocardiogram obtained as above 


* Monitor patient's weight


* Strict I&O's


* Telemetry


* 20mg TID lasix 


* Supplement potassium


* Monitor CRP





UTI (urinary tract infection)


* ? Chronic bacteriuria as patient is reportedly asymptomatic.


* UA weakly positivepatient was treated for E. coli UTI with Rocephin in 

  [place]


* Urine culture showing >100cfu E. Coli, sensitivities pending 


* Blood cultures negative thus far 


* Zosyn as above





GERD (gastroesophageal reflux disease)


* No acute concerns


* Monitor 





Meniere disease


* No acute concerns


* Monitor





Hypokalemia


* Supplement





Hypotension - resolved


* Noted in ED and multiple fluid boluses given


* Monitor 





Code status: DNR/DNI


PCP: Dr. Monroe in [place]


DVT prophylaxis: Lovenox


Disposition: Patient admitted to the floor for management and further work-up of

 suspected pneumonia, hypoxia, elevated troponin, elevated D-dimer, and weak 

UTI.  Anticipated length of stay 3 to 4 days.

## 2021-10-08 RX ADMIN — SODIUM CHLORIDE SCH MG: 0.9 INJECTION, SOLUTION INTRAVENOUS at 15:40

## 2021-10-08 RX ADMIN — SODIUM CHLORIDE SCH MG: 0.9 INJECTION, SOLUTION INTRAVENOUS at 09:39

## 2021-10-08 RX ADMIN — SODIUM CHLORIDE SCH MG: 0.9 INJECTION, SOLUTION INTRAVENOUS at 23:06

## 2021-10-08 NOTE — PCM.PN
- General Info


Date of Service: 10/08/21


Admission Dx/Problem (Free Text): 


                           Admission Diagnosis/Problem





Admission Diagnosis/Problem      Hypoxia








Functional Status: Reports: Pain Controlled, Tolerating Diet, Urinating, 

Incentive Spirometry, Other (Acapella ).  Denies: Ambulating, New Symptoms





- Review of Systems


General: Reports: No Symptoms, Weakness, Fatigue.  Denies: Fever, Malaise, 

Chills


HEENT: Reports: No Symptoms.  Denies: Headaches, Sore Throat


Pulmonary: Reports: Shortness of Breath, Cough, Sputum.  Denies: Wheezing


Cardiovascular: Reports: Dyspnea on Exertion.  Denies: Chest Pain, Palpitations,

Edema


Gastrointestinal: Reports: Difficulty Swallowing.  Denies: Abdominal Pain, 

Constipation, Decreased Appetite, Diarrhea, Nausea, Vomiting


Genitourinary: Reports: No Symptoms.  Denies: Pain


Musculoskeletal: Reports: No Symptoms


Skin: Reports: No Symptoms.  Denies: Cyanosis


Neurological: Reports: No Symptoms, Difficulty Walking, Weakness, Gait 

Disturbance.  Denies: Confusion, Dizziness, Headache, Numbness, Seizure, Sync

ope, Tingling, Tremors


Psychiatric: Reports: No Symptoms





- Patient Data


Vitals - Most Recent: 


                                Last Vital Signs











Temp  97.2 F   10/08/21 03:11


 


Pulse  104 H  10/08/21 03:11


 


Resp  28 H  10/08/21 03:11


 


BP  146/94 H  10/08/21 03:11


 


Pulse Ox  89 L  10/08/21 06:00











Weight - Most Recent: 130 lb 1.6 oz


I&O - Last 24 Hours: 


                                 Intake & Output











 10/07/21 10/07/21 10/08/21





 14:59 22:59 06:59


 


Intake Total 1050 800 850


 


Output Total 1300 480 420


 


Balance -250 320 430











Lab Results Last 24 Hours: 


                         Laboratory Results - last 24 hr











  10/07/21 10/07/21 10/07/21 Range/Units





  04:45 13:27 20:34 


 


WBC     (3.98-10.04)  K/mm3


 


RBC     (3.98-5.22)  M/mm3


 


Hgb     (11.2-15.7)  gm/dl


 


Hct     (34.1-44.9)  %


 


MCV     (79.4-94.8)  fl


 


MCH     (25.6-32.2)  pg


 


MCHC     (32.2-35.5)  g/dl


 


RDW Std Deviation     (36.4-46.3)  fL


 


Plt Count     (182-369)  K/mm3


 


MPV     (9.4-12.3)  fl


 


Neut % (Auto)     (34.0-71.1)  %


 


Lymph % (Auto)     (19.3-51.7)  %


 


Mono % (Auto)     (4.7-12.5)  %


 


Eos % (Auto)     (0.7-5.8)  


 


Baso % (Auto)     (0.1-1.2)  %


 


Neut # (Auto)     (1.56-6.13)  K/mm3


 


Lymph # (Auto)     (1.18-3.74)  K/mm3


 


Mono # (Auto)     (0.24-0.36)  K/mm3


 


Eos # (Auto)     (0.04-0.36)  K/mm3


 


Baso # (Auto)     (0.01-0.08)  K/mm3


 


Manual Slide Review  Abnormal smear    


 


PT   13.1 H   (9.7-12.0)  SECONDS


 


INR   1.19   


 


Sodium     (136-145)  mEq/L


 


Potassium     (3.5-5.1)  mEq/L


 


Chloride     ()  mEq/L


 


Carbon Dioxide     (21-32)  mEq/L


 


Anion Gap     (5-15)  


 


BUN     (7-18)  mg/dL


 


Creatinine     (0.55-1.02)  mg/dL


 


Est Cr Clr Drug Dosing     mL/min


 


Estimated GFR (MDRD)     (>60)  mL/min


 


BUN/Creatinine Ratio     (14-18)  


 


Glucose     (70-99)  mg/dL


 


Calcium     (8.5-10.1)  mg/dL


 


Magnesium     (1.8-2.4)  mg/dL


 


Total Bilirubin     (0.2-1.0)  mg/dL


 


AST     (15-37)  U/L


 


ALT     (14-59)  U/L


 


Alkaline Phosphatase     ()  U/L


 


C-Reactive Protein     (<1.0)  mg/dL


 


Total Protein     (6.4-8.2)  g/dl


 


Albumin     (3.4-5.0)  g/dl


 


Globulin     gm/dL


 


Albumin/Globulin Ratio     (1-2)  


 


Vancomycin Trough    12.4  (10.0-20.0)  














  10/08/21 10/08/21 10/08/21 Range/Units





  05:01 05:01 05:01 


 


WBC  8.33    (3.98-10.04)  K/mm3


 


RBC  3.87 L    (3.98-5.22)  M/mm3


 


Hgb  11.7    (11.2-15.7)  gm/dl


 


Hct  35.2    (34.1-44.9)  %


 


MCV  91.0    (79.4-94.8)  fl


 


MCH  30.2    (25.6-32.2)  pg


 


MCHC  33.2    (32.2-35.5)  g/dl


 


RDW Std Deviation  46.6 H    (36.4-46.3)  fL


 


Plt Count  339    (182-369)  K/mm3


 


MPV  9.9    (9.4-12.3)  fl


 


Neut % (Auto)  70.6    (34.0-71.1)  %


 


Lymph % (Auto)  12.4 L    (19.3-51.7)  %


 


Mono % (Auto)  12.5    (4.7-12.5)  %


 


Eos % (Auto)  2.9    (0.7-5.8)  


 


Baso % (Auto)  0.2    (0.1-1.2)  %


 


Neut # (Auto)  5.88    (1.56-6.13)  K/mm3


 


Lymph # (Auto)  1.03 L    (1.18-3.74)  K/mm3


 


Mono # (Auto)  1.04 H    (0.24-0.36)  K/mm3


 


Eos # (Auto)  0.24    (0.04-0.36)  K/mm3


 


Baso # (Auto)  0.02    (0.01-0.08)  K/mm3


 


Manual Slide Review     


 


PT    13.2 H  (9.7-12.0)  SECONDS


 


INR    1.20  


 


Sodium   137   (136-145)  mEq/L


 


Potassium   3.2 L   (3.5-5.1)  mEq/L


 


Chloride   98   ()  mEq/L


 


Carbon Dioxide   29   (21-32)  mEq/L


 


Anion Gap   13.2   (5-15)  


 


BUN   29 H   (7-18)  mg/dL


 


Creatinine   1.0   (0.55-1.02)  mg/dL


 


Est Cr Clr Drug Dosing   34.42   mL/min


 


Estimated GFR (MDRD)   53   (>60)  mL/min


 


BUN/Creatinine Ratio   29.0 H   (14-18)  


 


Glucose   101 H   (70-99)  mg/dL


 


Calcium   8.4 L   (8.5-10.1)  mg/dL


 


Magnesium   1.9   (1.8-2.4)  mg/dL


 


Total Bilirubin   0.6   (0.2-1.0)  mg/dL


 


AST   22   (15-37)  U/L


 


ALT   22   (14-59)  U/L


 


Alkaline Phosphatase   91   ()  U/L


 


C-Reactive Protein   16.2 H*   (<1.0)  mg/dL


 


Total Protein   6.7   (6.4-8.2)  g/dl


 


Albumin   1.9 L   (3.4-5.0)  g/dl


 


Globulin   4.8   gm/dL


 


Albumin/Globulin Ratio   0.4 L   (1-2)  


 


Vancomycin Trough     (10.0-20.0)  











Robert Results Last 24 Hours: 


                                  Microbiology











 10/04/21 23:30 Urine Culture - Final





 Urine    Escherichia Coli





    Mixed Gram Positive Addl Isol


 


 10/05/21 16:30 Blood Culture - Preliminary





 Blood - Venous - Lab Draw 


 


 10/05/21 16:20 Blood Culture - Preliminary





 Blood - Venous 











Med Orders - Current: 


                               Current Medications





Albuterol (Albuterol 0.083% 2.5 Mg/3 Ml Neb Soln)  2.5 mg NEB Q2H PRN


   PRN Reason: Shortness Of Breath/wheezing


Albuterol/Ipratropium (Albuterol/Ipratropium 3.0-0.5 Mg/3 Ml Neb Soln)  3 ml NEB

QIDRT [facility]


   Last Admin: 10/08/21 05:59 Dose:  3 ml


   Documented by: 


Docusate Sodium (Docusate Sodium 100 Mg Cap)  100 mg PO Q12H PRN


   PRN Reason: Constipation


   Last Admin: 10/07/21 09:12 Dose:  100 mg


   Documented by: 


Enoxaparin Sodium (Enoxaparin 60 Mg/0.6 Ml Syringe)  60 mg SUBCUT Q12H [facility]


   Last Admin: 10/07/21 22:00 Dose:  60 mg


   Documented by: 


Furosemide (Furosemide 20 Mg/2 Ml Vial)  20 mg IVPUSH DAILY [facility]


Guaifenesin (Guaifenesin 600 Mg Tab.Er)  600 mg PO BID [facility]


   Last Admin: 10/07/21 22:00 Dose:  600 mg


   Documented by: 


Potassium Chloride 10 meq/ (Premix)  100 mls @ 100 mls/hr IV Q1H [facility]


   Stop: 10/08/21 12:59


Methylprednisolone Sodium Succinate (Methylprednisolone Sodium Succinate 40 Mg/1

Ml Sdv)  60 mg IVPUSH Q8H [facility]


Ondansetron HCl (Ondansetron 4 Mg/2 Ml Sdv)  4 mg IV Q6H PRN


   PRN Reason: Nausea/Vomiting


Sodium Chloride (Sodium Chloride 0.9% 10 Ml Syringe)  10 ml FLUSH ASDIRECTED PRN


   PRN Reason: Keep Vein Open


   Last Admin: 10/04/21 18:12 Dose:  10 ml


   Documented by: 


Trimethoprim/Sulfamethoxazole (Sulfamethoxazole/Trimethoprim 800-160 Mg Tab)  1 

tab PO BID [facility]


Vancomycin HCl (Pharmacy To Dose - Vancomycin)  1 dose .XX ASDIRECTED [facility]


Warfarin Sodium (Pharmacy To Dose - Warfarin)  1 dose .XX ASDIRECTED PRN


   PRN Reason: RX TOD DOSE WARFARIN





Discontinued Medications





Albuterol (Albuterol 0.083% 2.5 Mg/3 Ml Neb Soln)  2.5 mg NEB ONETIME ONE


   Stop: 10/04/21 15:19


   Last Admin: 10/04/21 15:47 Dose:  2.5 mg


   Documented by: 


Albuterol/Ipratropium (Albuterol/Ipratropium 3.0-0.5 Mg/3 Ml Neb Soln)  3 ml NEB

ONETIME ONE


   Stop: 10/05/21 08:21


   Last Admin: 10/05/21 08:37 Dose:  3 ml


   Documented by: 


Apixaban (Apixaban 5 Mg Tab)  10 mg PO BID [facility]


   Stop: 10/12/21 21:01


   Last Admin: 10/07/21 09:13 Dose:  10 mg


   Documented by: 


Enoxaparin Sodium (Enoxaparin 40 Mg/0.4 Ml Syringe)  40 mg SUBCUT DAILY [facility]


   Last Admin: 10/05/21 14:49 Dose:  40 mg


   Documented by: 


Enoxaparin Sodium (Enoxaparin 60 Mg/0.6 Ml Syringe)  20 mg SUBCUT ONETIME ONE


   Stop: 10/05/21 16:31


   Last Admin: 10/05/21 16:53 Dose:  20 mg


   Documented by: 


Furosemide (Furosemide 40 Mg/4 Ml Vial)  40 mg IVPUSH NOW ONE


   Stop: 10/05/21 20:04


   Last Admin: 10/05/21 21:33 Dose:  40 mg


   Documented by: 


Furosemide (Furosemide 20 Mg/2 Ml Vial)  20 mg IVPUSH TIDMEALS [facility]


   Last Admin: 10/08/21 06:34 Dose:  20 mg


   Documented by: 


Furosemide (Furosemide 20 Mg/2 Ml Vial)  20 mg IVPUSH DAILY [facility]


Sodium Chloride (Normal Saline)  100 mls @ 60 mls/min IV ASDIRECTED [facility]


   Last Admin: 10/04/21 18:12 Dose:  60 mls/min


   Documented by: 


Sodium Chloride (Normal Saline)  1,000 mls @ 250 mls/hr IV ASDIRECTED [facility]


   Last Admin: 10/04/21 22:14 Dose:  250 mls/hr


   Documented by: 


Ceftriaxone Sodium 1 gm/ (Sodium Chloride)  100 mls @ 200 mls/hr IV ONETIME ONE


   Stop: 10/05/21 09:25


   Last Admin: 10/05/21 09:20 Dose:  200 mls/hr


   Documented by: 


Vancomycin HCl 1 gm/ Sodium (Chloride)  250 mls @ 250 mls/hr IV Q18H [facility]


   Last Admin: 10/05/21 14:26 Dose:  Not Given


   Documented by: 


Vancomycin HCl 1 gm/ Sodium (Chloride)  250 mls @ 250 mls/hr IV Q18H [facility]


   Last Admin: 10/05/21 15:37 Dose:  Not Given


   Documented by: 


Piperacillin Sod/Tazobactam (Sod 4.5 gm/ Sodium Chloride)  100 mls @ 200 mls/hr 

IV ONETIME ONE


   Stop: 10/05/21 14:59


   Last Admin: 10/05/21 14:19 Dose:  200 mls/hr


   Documented by: 


Piperacillin Sod/Tazobactam (Sod 4.5 gm/ Sodium Chloride)  100 mls @ 25 mls/hr 

IV Q8H [facility]


   Last Admin: 10/08/21 06:34 Dose:  25 mls/hr


   Documented by: 


Vancomycin HCl 1 gm/ Sodium (Chloride)  250 mls @ 250 mls/hr IV Q18H [facility]


   Stop: 10/07/21 23:59


   Last Admin: 10/07/21 22:00 Dose:  250 mls/hr


   Documented by: 


Sodium Chloride (Normal Saline)  1,000 mls @ 100 mls/hr IV ASDIRECTED [facility]


Vancomycin HCl 1 gm/ Sodium (Chloride)  250 mls @ 250 mls/hr IV Q12H [facility]


Iopamidol (Iopamidol 755 Mg/Ml 100 Ml Bottle)  100 ml IVPUSH ONETIME ONE


   Stop: 10/04/21 18:12


   Last Admin: 10/04/21 18:12 Dose:  100 ml


   Documented by: 


Methylprednisolone Sodium Succinate (Methylprednisolone Sodium Succinate 125 

Mg/2 Ml Sdv)  125 mg IVPUSH ONETIME ONE


   Stop: 10/05/21 08:21


   Last Admin: 10/05/21 08:25 Dose:  125 mg


   Documented by: 


Potassium Chloride (Potassium Chloride 10 Meq Tab.Er)  10 meq PO TID [facility]


   Last Admin: 10/07/21 22:00 Dose:  10 meq


   Documented by: 


Potassium Chloride (Potassium Chloride 20 Meq Tab.Er)  20 meq PO ONETIME ONE


   Stop: 10/06/21 07:21


   Last Admin: 10/06/21 08:37 Dose:  20 meq


   Documented by: 


Sodium Chloride (Sodium Chloride 0.9% 10 Ml Sdv)  10 ml FLUSH ONETIME ONE


   Stop: 10/04/21 18:12


   Last Admin: 10/04/21 20:31 Dose:  10 ml


   Documented by: 


Warfarin Sodium (Warfarin 4 Mg Tab)  4 mg PO QPM [facility]


   Stop: 10/07/21 18:01


   Last Admin: 10/07/21 18:08 Dose:  4 mg


   Documented by: 











- Exam


Quality Assessment: Supplemental Oxygen (55 L with an FiO2 of 65%), DVT 

Prophylaxis (Warfarin with Lovenox bridging).  No: Urine Catheter


General: Alert, Oriented, Cooperative, Mild Distress (Looks uncomfortable)


HEENT: Pupils Equal, Pupils Reactive, Mucous Membr. Moist/Pink


Neck: Supple, Trachea Midline


Lungs: Normal Respiratory Effort, Decreased Breath Sounds, Crackles, Rhonchi, 

Wheezing


Cardiovascular: Regular Rate, Regular Rhythm


GI/Abdominal Exam: Normal Bowel Sounds, Soft, Non-Tender, No Distention


 (Female) Exam: Deferred


Back Exam: Normal Inspection, Decreased Range of Motion


Extremities: Normal Inspection, Normal Range of Motion, Non-Tender, No Pedal 

Edema, Normal Capillary Refill


Peripheral Pulses: 2+: Radial (L), Radial (R), Dorsalis Pedis (L), Dorsalis 

Pedis (R)


Skin: Warm, Dry, Intact


Neurological: No New Focal Deficit


Psy/Mental Status: Alert, Normal Affect, Normal Mood





- Patient Data


Lab Results Last 24 hrs: 


                         Laboratory Results - last 24 hr











  10/07/21 10/07/21 10/07/21 Range/Units





  04:45 13:27 20:34 


 


WBC     (3.98-10.04)  K/mm3


 


RBC     (3.98-5.22)  M/mm3


 


Hgb     (11.2-15.7)  gm/dl


 


Hct     (34.1-44.9)  %


 


MCV     (79.4-94.8)  fl


 


MCH     (25.6-32.2)  pg


 


MCHC     (32.2-35.5)  g/dl


 


RDW Std Deviation     (36.4-46.3)  fL


 


Plt Count     (182-369)  K/mm3


 


MPV     (9.4-12.3)  fl


 


Neut % (Auto)     (34.0-71.1)  %


 


Lymph % (Auto)     (19.3-51.7)  %


 


Mono % (Auto)     (4.7-12.5)  %


 


Eos % (Auto)     (0.7-5.8)  


 


Baso % (Auto)     (0.1-1.2)  %


 


Neut # (Auto)     (1.56-6.13)  K/mm3


 


Lymph # (Auto)     (1.18-3.74)  K/mm3


 


Mono # (Auto)     (0.24-0.36)  K/mm3


 


Eos # (Auto)     (0.04-0.36)  K/mm3


 


Baso # (Auto)     (0.01-0.08)  K/mm3


 


Manual Slide Review  Abnormal smear    


 


PT   13.1 H   (9.7-12.0)  SECONDS


 


INR   1.19   


 


Sodium     (136-145)  mEq/L


 


Potassium     (3.5-5.1)  mEq/L


 


Chloride     ()  mEq/L


 


Carbon Dioxide     (21-32)  mEq/L


 


Anion Gap     (5-15)  


 


BUN     (7-18)  mg/dL


 


Creatinine     (0.55-1.02)  mg/dL


 


Est Cr Clr Drug Dosing     mL/min


 


Estimated GFR (MDRD)     (>60)  mL/min


 


BUN/Creatinine Ratio     (14-18)  


 


Glucose     (70-99)  mg/dL


 


Calcium     (8.5-10.1)  mg/dL


 


Magnesium     (1.8-2.4)  mg/dL


 


Total Bilirubin     (0.2-1.0)  mg/dL


 


AST     (15-37)  U/L


 


ALT     (14-59)  U/L


 


Alkaline Phosphatase     ()  U/L


 


C-Reactive Protein     (<1.0)  mg/dL


 


Total Protein     (6.4-8.2)  g/dl


 


Albumin     (3.4-5.0)  g/dl


 


Globulin     gm/dL


 


Albumin/Globulin Ratio     (1-2)  


 


Vancomycin Trough    12.4  (10.0-20.0)  














  10/08/21 10/08/21 10/08/21 Range/Units





  05:01 05:01 05:01 


 


WBC  8.33    (3.98-10.04)  K/mm3


 


RBC  3.87 L    (3.98-5.22)  M/mm3


 


Hgb  11.7    (11.2-15.7)  gm/dl


 


Hct  35.2    (34.1-44.9)  %


 


MCV  91.0    (79.4-94.8)  fl


 


MCH  30.2    (25.6-32.2)  pg


 


MCHC  33.2    (32.2-35.5)  g/dl


 


RDW Std Deviation  46.6 H    (36.4-46.3)  fL


 


Plt Count  339    (182-369)  K/mm3


 


MPV  9.9    (9.4-12.3)  fl


 


Neut % (Auto)  70.6    (34.0-71.1)  %


 


Lymph % (Auto)  12.4 L    (19.3-51.7)  %


 


Mono % (Auto)  12.5    (4.7-12.5)  %


 


Eos % (Auto)  2.9    (0.7-5.8)  


 


Baso % (Auto)  0.2    (0.1-1.2)  %


 


Neut # (Auto)  5.88    (1.56-6.13)  K/mm3


 


Lymph # (Auto)  1.03 L    (1.18-3.74)  K/mm3


 


Mono # (Auto)  1.04 H    (0.24-0.36)  K/mm3


 


Eos # (Auto)  0.24    (0.04-0.36)  K/mm3


 


Baso # (Auto)  0.02    (0.01-0.08)  K/mm3


 


Manual Slide Review     


 


PT    13.2 H  (9.7-12.0)  SECONDS


 


INR    1.20  


 


Sodium   137   (136-145)  mEq/L


 


Potassium   3.2 L   (3.5-5.1)  mEq/L


 


Chloride   98   ()  mEq/L


 


Carbon Dioxide   29   (21-32)  mEq/L


 


Anion Gap   13.2   (5-15)  


 


BUN   29 H   (7-18)  mg/dL


 


Creatinine   1.0   (0.55-1.02)  mg/dL


 


Est Cr Clr Drug Dosing   34.42   mL/min


 


Estimated GFR (MDRD)   53   (>60)  mL/min


 


BUN/Creatinine Ratio   29.0 H   (14-18)  


 


Glucose   101 H   (70-99)  mg/dL


 


Calcium   8.4 L   (8.5-10.1)  mg/dL


 


Magnesium   1.9   (1.8-2.4)  mg/dL


 


Total Bilirubin   0.6   (0.2-1.0)  mg/dL


 


AST   22   (15-37)  U/L


 


ALT   22   (14-59)  U/L


 


Alkaline Phosphatase   91   ()  U/L


 


C-Reactive Protein   16.2 H*   (<1.0)  mg/dL


 


Total Protein   6.7   (6.4-8.2)  g/dl


 


Albumin   1.9 L   (3.4-5.0)  g/dl


 


Globulin   4.8   gm/dL


 


Albumin/Globulin Ratio   0.4 L   (1-2)  


 


Vancomycin Trough     (10.0-20.0)  











Result Diagrams: 


                                 10/08/21 05:01





                                 10/08/21 05:01


Robert Results Last 24 hrs: 


                                  Microbiology











 10/04/21 23:30 Urine Culture - Final





 Urine    Escherichia Coli





    Mixed Gram Positive Addl Isol


 


 10/05/21 16:30 Blood Culture - Preliminary





 Blood - Venous - Lab Draw 


 


 10/05/21 16:20 Blood Culture - Preliminary





 Blood - Venous 














Sepsis Event Note





- Evaluation


Sepsis Screening Result: Sepsis Risk





- Focused Exam


Vital Signs: 


                                   Vital Signs











  Temp Pulse Resp BP Pulse Ox Pulse Ox


 


 10/08/21 06:00       89 L


 


 10/08/21 03:11  97.2 F  104 H  28 H  146/94 H  90 L 


 


 10/07/21 22:10    24 H   


 


 10/07/21 22:02   109 H   135/98 H  91 L 


 


 10/07/21 20:19       89 L














- Problem List & Annotations


(1) History of COVID-19


SNOMED Code(s): 447293864731068529, 184615307246402604


   Code(s): Z86.16 - PERSONAL HISTORY OF COVID-19   Status: Chronic   Priority: 

Medium   Current Visit: Yes   





(2) Elevated d-dimer


SNOMED Code(s): 407918479


   Code(s): R79.89 - OTHER SPECIFIED ABNORMAL FINDINGS OF BLOOD CHEMISTRY   

Status: Acute   Priority: Medium   Current Visit: Yes   





(3) Elevated troponin


SNOMED Code(s): 744728465, 590543800, 150806180


   Code(s): R77.8 - OTHER SPECIFIED ABNORMALITIES OF PLASMA PROTEINS   Status: 

Resolved   Priority: High   Current Visit: Yes   





(4) Pneumonia


SNOMED Code(s): 044814597


   Code(s): J18.9 - PNEUMONIA, UNSPECIFIED ORGANISM   Status: Suspected   

Priority: High   Current Visit: Yes   


Qualifiers: 


   Pneumonia type: due to unspecified organism   Laterality: unspecified 

laterality   Lung location: unspecified part of lung   Qualified Code(s): J18.9 

- Pneumonia, unspecified organism   





(5) Dyspnea


SNOMED Code(s): 010342971


   Code(s): R06.00 - DYSPNEA, UNSPECIFIED   Status: Acute   Priority: High   

Current Visit: Yes   


Qualifiers: 


   Dyspnea type: unspecified   Qualified Code(s): R06.00 - Dyspnea, unspecified 

 





(6) Hypoxia


SNOMED Code(s): 446463023


   Code(s): R09.02 - HYPOXEMIA   Status: Acute   Priority: High   Current Visit:

Yes   





(7) UTI (urinary tract infection)


SNOMED Code(s): 37511828


   Code(s): N39.0 - URINARY TRACT INFECTION, SITE NOT SPECIFIED   Status: Acute 

 Priority: High   Current Visit: Yes   


Qualifiers: 


   Urinary tract infection type: site unspecified   Hematuria presence: without 

hematuria   Qualified Code(s): N39.0 - Urinary tract infection, site not 

specified   





(8) Elevated brain natriuretic peptide (BNP) level


SNOMED Code(s): 180709562, 831087821


   Code(s): R79.89 - OTHER SPECIFIED ABNORMAL FINDINGS OF BLOOD CHEMISTRY   

Status: Acute   Priority: High   Current Visit: Yes   





(9) On home oxygen therapy


SNOMED Code(s): 637452350112


   Code(s): Z99.81 - DEPENDENCE ON SUPPLEMENTAL OXYGEN   Status: Chronic   

Priority: Medium   Current Visit: Yes   





(10) GERD (gastroesophageal reflux disease)


SNOMED Code(s): 692163366


   Code(s): K21.9 - GASTRO-ESOPHAGEAL REFLUX DISEASE WITHOUT ESOPHAGITIS   

Status: Chronic   Priority: Low   Current Visit: No   


Qualifiers: 


   Esophagitis presence: esophagitis presence not specified   Qualified Code(s):

K21.9 - Gastro-esophageal reflux disease without esophagitis   





(11) Meniere disease


SNOMED Code(s): 50031784


   Code(s): H81.09 - MENIERE'S DISEASE, UNSPECIFIED EAR   Status: Chronic   

Priority: Low   Current Visit: No   


Qualifiers: 


   Laterality: unspecified laterality   Qualified Code(s): H81.09 - Meniere's 

disease, unspecified ear   





(12) DVT (deep venous thrombosis)


SNOMED Code(s): 315953604


   Code(s): I82.409 - ACUTE EMBOLISM AND THOMBOS UNSP DEEP VN UNSP LOWER 

EXTREMITY   Status: Acute   Priority: High   Current Visit: Yes   


Qualifiers: 


   DVT location: lower extremity   Affected thrombotic vein of extremity: 

unspecified vein of extremity   Chronicity: acute   Laterality: left   Qualified

Code(s): I82.402 - Acute embolism and thrombosis of unspecified deep veins of 

left lower extremity   





(13) Hypotension


SNOMED Code(s): 49630892


   Code(s): I95.9 - HYPOTENSION, UNSPECIFIED   Status: Resolved   Priority: High

  Current Visit: Yes   


Qualifiers: 


   Hypotension type: unspecified hypotension type   Qualified Code(s): I95.9 - 

Hypotension, unspecified   





(14) Acute and chronic respiratory failure


SNOMED Code(s): 75960100


   Code(s): J96.20 - ACUTE AND CHR RESP FAILURE, UNSP W HYPOXIA OR HYPERCAPNIA  

Status: Acute   Priority: High   Current Visit: Yes   


Qualifiers: 


   Respiratory failure complication: hypoxia   Qualified Code(s): J96.21 - Acute

and chronic respiratory failure with hypoxia   





(15) Myocarditis


SNOMED Code(s): 57102149


   Code(s): I51.4 - MYOCARDITIS, UNSPECIFIED   Status: Suspected   Priority: 

High   Current Visit: Yes   


Qualifiers: 


   Myocarditis type: infective   Infective myocarditis organism: viral   

Chronicity: acute   Qualified Code(s): I40.0 - Infective myocarditis   





(16) Hypokalemia


SNOMED Code(s): 53111131


   Code(s): E87.6 - HYPOKALEMIA   Status: Acute   Priority: High   Current 

Visit: Yes   





(17) Pulmonary fibrosis


SNOMED Code(s): 47010648


   Code(s): J84.10 - PULMONARY FIBROSIS, UNSPECIFIED   Status: Suspected   

Priority: High   Current Visit: Yes   





- Problem List Review


Problem List Initiated/Reviewed/Updated: Yes





- My Orders


Last 24 Hours: 


My Active Orders





10/07/21 13:15


Pharmacy to Dose - Warfarin   1 dose .XX ASDIRECTED PRN 





10/07/21 21:00


Enoxaparin [Lovenox]   60 mg SUBCUT Q12H 





10/08/21 05:01


CBC WITH AUTO DIFF [HEME] AM 





10/08/21 06:52


PROCALCITONIN [REF] Routine 





10/08/21 06:55


SLP Evaluation and Treatment [CONS] Routine 





10/08/21 07:00


Potassium Chloride [KCl in Water 10 MEQ/100 ML] 10 meq   Premix Bag 1 bag IV Q1H




methylPREDNISolone Sod Succ [Solu-MEDROL]   60 mg IVPUSH Q8H 





10/08/21 09:00


Sulfamethoxazole/Trimethoprim [Septra DS]   1 tab PO BID 





10/09/21 05:11


C-REACTIVE PROTEIN [CHEM] AM 


CBC WITH AUTO DIFF [HEME] AM 


COMPREHENSIVE METABOLIC PN,CMP [CHEM] AM 


INR,PT,PROTHROMBIN TIME [COAG] AM 


MAGNESIUM [CHEM] AM 





10/09/21 09:00


Furosemide [Lasix]   20 mg IVPUSH DAILY 





10/10/21 05:11


INR,PT,PROTHROMBIN TIME [COAG] AM 





10/11/21 05:11


INR,PT,PROTHROMBIN TIME [COAG] AM 














- Assessment


Assessment:: 


Admission assessment - 10/5/2021


* 81-year-old female who presents to ED on 10/4/2021 with low oxygen saturations


* History of Mnire's disease, GERD, and prior Covid pneumonia


* Was hospitalized from 9/13/2021 through 9/27/2021 with COVID-19 pneumonia at 

  [facility] in [place]. 


* Discharged home on 4 L of oxygen with activity after completing 4 days of 

  remdesivir and 10 days of Decadron. 


* Of note patient was noted to have episodes of bradycardia and hypotension with

   heart rate in the 30s and 40s. She was noticed to have episodes of Mobitz 

  type I AV block and 2.5-second sinus pauses. It was believed this was due to 

  remdesivir however this continued after stopping. Patient was to follow-up 

  with EP and have a 2-week cardiac monitor after discharge. 


* Prior to presenting to the ED she was noted to have saturations in the low 

  50s. 


* Patient's  increased her oxygen to 8 L via nasal cannula. 


* Denies any recent fever, chills, nausea, vomiting, diarrhea, urinary symptoms,

   or smoking history. 


* Of note patient did have acute cystitis with an E. coli UTI well in the 

  hospital in [place] and completed 5 days of Rocephin there. 


* 12-lead EKG was obtained showing a sinus tachycardia at 109 bpm with a LAFB 

  and very mild ST elevation in V2 and V3. 


* Placed on a nonrebreather mask which improved her saturations to 90 to 91%. 


* Noted to be dyspneic and only able to complete a few word sentences. 


* Labs are obtained:


   * WBC of 8.57. 


   * Hemoglobin 13.0. 


   * Hematocrit 30.5. 


   * Platelet 250,000. 


   * Neutrophils are elevated 75.7%. 


   * D-dimer is very high at 8.16. 


   * Sodium is low at 129. 


   * Potassium 3.8. 


   * Chloride 94. 


   * Carbon dioxide 24. 


   * Anion gap is 14.8. 


   * BUN is 15. Creatinine 0.6. GFR greater than 60. 


   * Glucose is 126. 


   * Calcium 8.7. 


   * Magnesium 1.8. 


   * Total bilirubin 0.6. 


   * AST is 36, ALT 31, alkaline phosphatase 86. 


   * Troponin is elevated at 0.127-->0.156-->0.067. 


   * CRP is very high at 29.3. 


   * Protein 7.1. 


   * Albumin 2.1. 


   * proBNP is 3117


   * UA is obtained and is mildly positive with slightly cloudy urine, 1+ 

     protein, 3+ ketones, 2+ occult blood, 1+ leukocyte esterase, 5-10 RBCs, 20-

     30 WBCs, rare WBC clumps, and moderate bacteria.


* ABG obtained in the left radial with a pH of 7.44. PCO2 of 30.9. PO2 of 58.0. 

  HCO3 of 20.5. O2 saturations 86%. Base excess is -2.3. Aa gradient is 15.0. 

  This is obtained while on nonrebreather at 15 L. 


* Facility was out of CPAP and BiPAP-> started on high flow O2 with saturations 

  in the low 90s. 


* CTA of the chest is obtained to rule out PE and shows:


   * 1. No findings of pulmonary embolism. 


   * 2. Minimal left-sided pleural effusion. 


   * 3. Diffuse emphysematous changes seen. Both lungs show diffuse increased 

     density uncertain how much of this represents diffuse fibrosis versus 

     possible superimposed pneumonia. Old comparison studies would be needed if 

     available. 


   * 4. 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 


   * 5. Other findings which are felt to be chronic as described above. 


* Chest x-ray shows scattered infiltrates bilaterally most prominent in the left

   lower lobe. 


* Plan was to transfer the patient for continued care due to elevated troponin 

  however [place] beds are noted to be available [place] or 

  [place]. 


* Noted to have a blood pressure of 80/61 and is given a 500 mill fluid bolus 

  over 2 hours. 


* Given 1 dose of Rocephin in the ED. She is also given 125 mg Solu-Medrol and a

   DuoNeb, which she reports greatly helped.


* Ultimately inpatient bed does open up at our facility and she is admitted to 

  the floor on telemetry for management of her hypoxia, suspected pneumonia, 

  elevated BNP, elevated troponin - likely demand ischemia, and questionable 

  UTI. 


* On floor bilateral lower extremity ultrasound shows thrombus within the left 

  mid and distal superficial, femoral, popliteal, posterior tibial, and peroneal

   veins.





10/6/2021


This is a 81 years old female post Covid infection who was hospitalized in 

[place] for approximately 2 weeks who presented to our ED with low saturations.

  On admission D-dimer was noted to be elevated however CTA was negative for any

 PE.  Lower extremity ultrasound did show a DVT in the left extremity and pat

ient was started on 1 mg/kg Lovenox transitioning to 10 mg twice daily Eliquis 

today.  Patient has been requiring high flow 60 L and was weaned down to 80% 

FiO2 today.  Unfortunately given patient's history of recent Covid infection CTA

 was not very helpful in differentiating pulmonary cause.  Given recent 

hospitalization there are concerns of a secondary bacterial infection.  WBC has 

been WNL however neutrophils are elevated.  Procalcitonin was obtained and was 

0.29.  Lactic acid yesterday was 1.0.  Patient was noted to be hypotensive in 

the emergency room and was given 2 fluid boluses.  She was also started on 

fluids over concerns of sepsis.  UA was positive and urine culture is growing ou

t greater than 100,000 CFU's of gram-negative rods thus far.  She does have a 

history of a E. coli UTI treated with Rocephin in [place].  Patient denies any 

urinary symptoms.  proBNP was elevated.  Echocardiogram was obtained on 

10/5/2021  And interpreted as: " 1.  The left ventricular internal cavity size 

is normal. 2.  Normal left ventricular systolic function. 3.  Left ventricular 

ejection fraction, by visual estimation, is 55 to 60%. 4.  No regional wall 

motion abnormalities. 5.  Mild proximal septal hypertrophy. 6.  Aortic valve is 

tricuspid. 7.  Mild mitral valve regurgitation. 8.  Trace tricuspid valve 

regurgitation. 9.  Mild pulmonic valve regurgitation. 10.  The inferior vena 

cava is normal. 11.  The right ventricular systolic pressure is mildly elevated 

at 32.2 mmHg."  Patient was started on 20 mg IV push Lasix 3 times daily 

yesterday and has had over 1 L output.  Potassium today is down to 3.3 and this 

is being supplemented.  Sodium 137.  Carbon dioxide 24.  Anion gap is 13.3.  BUN

 is 19.  Creatinine 0.6.  GFR greater than 60.  Glucose is 146.  Calcium 8.4.  

Magnesium is 1.8.  Total bilirubin 0.5.  AST is 26, ALT 26, alkaline phosphatase

 90.  Troponin today was down to 0.031.  CRP is down to 24.2.  Albumin is down 

to 1.8.  Mycoplasma and strep pneumonia were both checked and were negative.  

Respiratory viral panel was obtained and was negative however patient did return

 positive for Covid, which is unsurprising given the patient's recent known 

Covid infection.  We will continue current treatment plan.  It is felt it is 

prudent to continue IV antibiotics for now given patient's symptoms and lab 

results.  We will continue diuresis and Eliquis for DVT treatment.





10/7/2021


This is a 81-year-old female post Covid infection who was admitted for suspected

 pneumonia, pulmonary fibrosis, myocarditis, DVT, and apparent UTI.  Labs today 

show WBC of 8.94.  Hemoglobin is 11.1.  Neutrophils are elevated 77.7%.  Smear 

review feels slight toxic granulation and abnormal RBC morphology.  Sodium today

 is 140.  Potassium 3.7.  Chloride 103.  Carbon dioxide 20.  Anion gap is 12.7. 

 BUN is 25.  Creatinine 0.8.  GFR greater than 60.  Magnesium is 1.9.  Bilirubin

 0.5.  AST is 24, ALT 29, alkaline phosphatase is 90.  CRP is down to 12.5.  

Albumin is 1.8.  Patient does report that she feels quite a bit better today.  

She is down on high flow to 55 L with an FiO2 of 70% with saturations of upper 

80s to low 90s.  Urine culture continues to show greater than 100,000 CFU's of 

E. coli.  Sensitivities are pending.  Blood cultures have returned negative.  

Strep pneumonia is negative.  Discussed plan of care with Dr. Newsome who 

recommends patient receive 1 more dose of Lasix and then either discontinue or 

continue with 20 mg daily.  We will continue vancomycin and Zosyn due to 

concerns over possible pneumonia.  Unfortunately today patient reported that her

 insurance company tells her Eliquis will be $600 for 30-day supply.  She states

 she cannot afford this.  Apparently Xarelto is around the same pricing per case

 management.  We will therefore discontinue Eliquis start patient on 1 mg/kg 

Lovenox twice daily and start warfarin with pharmacy to dose.  Patient has had 

no fevers.  We will continue current treatment plan and hope to wean patient off

 of high flow.  Unknown length of stay due to severity of symptoms.





10/8/2021


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

 fibrosis, suspected myocarditis, DVT and UTI.  Labs today show WBC of 8.33.  

Hemoglobin is 11.7.  Neutrophils are 70.6.  INR is 1.20.  Sodium is 137.  

Potassium 3.2.  Chloride 98.  Carbon dioxide 29.  Anion gap is 13.2.  BUN is 29.

  Creatinine 1.0.  GFR 53.  Glucose 101.  CRP is up to 16.2.  Albumin is 1.9.  

Discussed plan of care with Dr. Russ, attending hospitalist.  We will at this 

time discontinue vancomycin and Zosyn and switch patient to Bactrim DS twice 

daily based on E. coli urine sensitivities.  We will repeat a procalcitonin.  We

 will also decrease Lasix to 20 mg IV push daily.  Patient's potassium will be 

supplemented.  Pharmacy continues to dose warfarin and patient remains on 1 mg/k

g twice daily Lovenox bridging.  Will start patient on 60 mg every 8 hour IV 

push methylprednisolone.  Patient has been reporting difficulty with swallowing 

pills.  Because of this we will order an SLP evaluation.  We have made some 

improvement with her high flow she is now at 55 L with an FiO2 of 65%.  We will 

continue to wean as patient tolerates.  Unknown length of stay due to severity 

of symptoms.





- Plan


Plan:: 


Pneumonia - suspected


Dyspnea


Hypoxia


History of COVID-19


On home oxygen therapy


Acute on chronic respiratory failure 


Pulmonary fibrosis - suspected 


* Droplet isolation (airborne/contact while on high flow O2)


* O2 as needed to keep saturation greater than 90%


* I-S/Acapella


* Discontinue zosyn and vancomycin


* High flow oxygen as directed


* 4 times daily scheduled DuoNebs


* Every 2 hours as needed albuterol nebulizer


* Consult RT


* Telemetry


* Blood cultures negative thus far 


* Continuous pulse oximetry


* Mucinex BID


* Recommend PFT after recovery due to emphysematous changes noted in CTA


* Recommend pulmonology follow-up after discharge


* Re-check procalcitonin 


* Start solu-medrol 60mg TID





DVT


Elevated d-dimer


* CTA in ED negative for PE


* Discontinue Eliquis as patient reports $600 for 30 day supply


* Warfarin with pharmacy to dose


* Lovenox 60mg (1mg/kg) BID as bridging 





Myocarditis - suspected 


Elevated troponin, resolved 


Elevated brain natriuretic peptide (BNP) level


* Troponin now WNL


* Echocardiogram obtained as above 


* Monitor patient's weight


* Strict I&O's


* Telemetry


* 20mg daily lasix


* Supplement potassium as needed 


* Monitor CRP





UTI (urinary tract infection)


* UA weakly positivepatient was treated for E. coli UTI with Rocephin in [facility]


* Urine culture showing >100cfu E. Coli, with good sensitivities to Bactrim DS


* Blood cultures negative thus far 


* Switch from zosyn/vancomycin to Bactrim DS BID PO





GERD (gastroesophageal reflux disease)


* No acute concerns


* Monitor 





Meniere disease


* No acute concerns


* Monitor





Hypokalemia


* Supplement





Hypotension - resolved


* Noted in ED and multiple fluid boluses given


* Monitor 





Dysphagia


* Patient reports difficulty swallowing pills


* SLP evaluation





Code status: DNR/DNI


PCP: Dr. Monroe in [place]


DVT prophylaxis: Lovenox


Disposition: Patient admitted to the floor for management and further work-up of

 suspected pneumonia, hypoxia, elevated troponin, elevated D-dimer, and weak 

UTI.  





Length of stay greater than 96 hours due to need for continued treatment.

## 2021-10-09 RX ADMIN — SODIUM CHLORIDE SCH MG: 0.9 INJECTION, SOLUTION INTRAVENOUS at 07:29

## 2021-10-09 RX ADMIN — SODIUM CHLORIDE SCH MG: 0.9 INJECTION, SOLUTION INTRAVENOUS at 22:10

## 2021-10-09 RX ADMIN — SODIUM CHLORIDE SCH MG: 0.9 INJECTION, SOLUTION INTRAVENOUS at 15:11

## 2021-10-09 RX ADMIN — DILTIAZEM HYDROCHLORIDE PRN MG: 5 INJECTION INTRAVENOUS at 10:36

## 2021-10-09 NOTE — PCM.PN
- General Info


Date of Service: 10/09/21


Admission Dx/Problem (Free Text): 


                           Admission Diagnosis/Problem





Admission Diagnosis/Problem      Hypoxia








Subjective Update: 





Patient had a difficult morning.  She required high flow nasal cannula at 60 L 

at 95% with a nonrebreather mask over that.  Oxygen saturations did finally 

increase.  Also, patient was in atrial fib with rate in the 1 teens to 130s.  

She required Cardizem 10 mg IV x1 to get her heart rate below 100.  This did 

help her oxygen saturations and at this time she is on high flow nasal cannula 

60 L at 95% without the nonrebreather mask.  Patient remains short of breath.


I had a long discussion with her and her  in regards to her outcome.  

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

fibrosis.  She does not want to be intubated, but she also does not want to be 

comfort measures at this time.   understands her condition and is 

supportive.


Functional Status: Reports: Pain Controlled





- Review of Systems


General: Reports: Fatigue


Pulmonary: Reports: Shortness of Breath, Cough


Cardiovascular: Reports: Dyspnea on Exertion





- Patient Data


Vitals - Most Recent: 


                                Last Vital Signs











Temp  96.4 F L  10/09/21 10:02


 


Pulse  110 H  10/09/21 10:02


 


Resp  22 H  10/09/21 10:02


 


BP  102/67   10/09/21 10:02


 


Pulse Ox  90 L  10/09/21 10:02











Weight - Most Recent: 129 lb 4.8 oz


I&O - Last 24 Hours: 


                                 Intake & Output











 10/08/21 10/09/21 10/09/21





 22:59 06:59 14:59


 


Intake Total 1300 200 


 


Output Total  150 


 


Balance 1300 50 











Lab Results Last 24 Hours: 


                         Laboratory Results - last 24 hr











  10/08/21 10/09/21 10/09/21 Range/Units





  05:01 05:37 05:37 


 


WBC   10.99 H   (3.98-10.04)  K/mm3


 


RBC   3.97 L   (3.98-5.22)  M/mm3


 


Hgb   12.0   (11.2-15.7)  gm/dl


 


Hct   35.4   (34.1-44.9)  %


 


MCV   89.2   (79.4-94.8)  fl


 


MCH   30.2   (25.6-32.2)  pg


 


MCHC   33.9   (32.2-35.5)  g/dl


 


RDW Std Deviation   46.6 H   (36.4-46.3)  fL


 


Plt Count   415 H D   (182-369)  K/mm3


 


MPV   10.1   (9.4-12.3)  fl


 


Neut % (Auto)   84.8 H   (34.0-71.1)  %


 


Lymph % (Auto)   10.3 L   (19.3-51.7)  %


 


Mono % (Auto)   3.5 L   (4.7-12.5)  %


 


Eos % (Auto)   0.1 L   (0.7-5.8)  


 


Baso % (Auto)   0.1   (0.1-1.2)  %


 


Neut # (Auto)   9.33 H   (1.56-6.13)  K/mm3


 


Lymph # (Auto)   1.13 L   (1.18-3.74)  K/mm3


 


Mono # (Auto)   0.38 H   (0.24-0.36)  K/mm3


 


Eos # (Auto)   0.01 L   (0.04-0.36)  K/mm3


 


Baso # (Auto)   0.01   (0.01-0.08)  K/mm3


 


PT     (9.7-12.0)  SECONDS


 


INR     


 


Sodium    134 L  (136-145)  mEq/L


 


Potassium    3.7  (3.5-5.1)  mEq/L


 


Chloride    100  ()  mEq/L


 


Carbon Dioxide    23  (21-32)  mEq/L


 


Anion Gap    14.7  (5-15)  


 


BUN    41 H  (7-18)  mg/dL


 


Creatinine    1.4 H  (0.55-1.02)  mg/dL


 


Est Cr Clr Drug Dosing    24.59  mL/min


 


Estimated GFR (MDRD)    36  (>60)  mL/min


 


BUN/Creatinine Ratio    29.3 H  (14-18)  


 


Glucose    142 H  (70-99)  mg/dL


 


Calcium    8.9  (8.5-10.1)  mg/dL


 


Magnesium    2.1  (1.8-2.4)  mg/dL


 


Total Bilirubin    0.3  (0.2-1.0)  mg/dL


 


AST    24  (15-37)  U/L


 


ALT    22  (14-59)  U/L


 


Alkaline Phosphatase    85  ()  U/L


 


C-Reactive Protein    12.7 H*  (<1.0)  mg/dL


 


Total Protein    6.8  (6.4-8.2)  g/dl


 


Albumin    1.9 L  (3.4-5.0)  g/dl


 


Globulin    4.9  gm/dL


 


Albumin/Globulin Ratio    0.4 L  (1-2)  


 


Procalcitonin  0.18 H    ng/mL














  10/09/21 Range/Units





  05:37 


 


WBC   (3.98-10.04)  K/mm3


 


RBC   (3.98-5.22)  M/mm3


 


Hgb   (11.2-15.7)  gm/dl


 


Hct   (34.1-44.9)  %


 


MCV   (79.4-94.8)  fl


 


MCH   (25.6-32.2)  pg


 


MCHC   (32.2-35.5)  g/dl


 


RDW Std Deviation   (36.4-46.3)  fL


 


Plt Count   (182-369)  K/mm3


 


MPV   (9.4-12.3)  fl


 


Neut % (Auto)   (34.0-71.1)  %


 


Lymph % (Auto)   (19.3-51.7)  %


 


Mono % (Auto)   (4.7-12.5)  %


 


Eos % (Auto)   (0.7-5.8)  


 


Baso % (Auto)   (0.1-1.2)  %


 


Neut # (Auto)   (1.56-6.13)  K/mm3


 


Lymph # (Auto)   (1.18-3.74)  K/mm3


 


Mono # (Auto)   (0.24-0.36)  K/mm3


 


Eos # (Auto)   (0.04-0.36)  K/mm3


 


Baso # (Auto)   (0.01-0.08)  K/mm3


 


PT  20.1 H D  (9.7-12.0)  SECONDS


 


INR  1.85  


 


Sodium   (136-145)  mEq/L


 


Potassium   (3.5-5.1)  mEq/L


 


Chloride   ()  mEq/L


 


Carbon Dioxide   (21-32)  mEq/L


 


Anion Gap   (5-15)  


 


BUN   (7-18)  mg/dL


 


Creatinine   (0.55-1.02)  mg/dL


 


Est Cr Clr Drug Dosing   mL/min


 


Estimated GFR (MDRD)   (>60)  mL/min


 


BUN/Creatinine Ratio   (14-18)  


 


Glucose   (70-99)  mg/dL


 


Calcium   (8.5-10.1)  mg/dL


 


Magnesium   (1.8-2.4)  mg/dL


 


Total Bilirubin   (0.2-1.0)  mg/dL


 


AST   (15-37)  U/L


 


ALT   (14-59)  U/L


 


Alkaline Phosphatase   ()  U/L


 


C-Reactive Protein   (<1.0)  mg/dL


 


Total Protein   (6.4-8.2)  g/dl


 


Albumin   (3.4-5.0)  g/dl


 


Globulin   gm/dL


 


Albumin/Globulin Ratio   (1-2)  


 


Procalcitonin   ng/mL











Med Orders - Current: 


                               Current Medications





Al Hydroxide/Mg Hydroxide (Aluminum Hydroxide/Magnesium Hydroxide/Simethicone 

Susp 30 Ml Cup)  30 ml PO Q4H PRN


   PRN Reason: Heartburn


   Last Admin: 10/08/21 23:27 Dose:  30 ml


   Documented by: 


Albuterol (Albuterol 0.083% 2.5 Mg/3 Ml Neb Soln)  2.5 mg NEB Q2H PRN


   PRN Reason: Shortness Of Breath/wheezing


Albuterol/Ipratropium (Albuterol/Ipratropium 3.0-0.5 Mg/3 Ml Neb Soln)  3 ml NEB

QIDRT [facility]


   Last Admin: 10/09/21 09:15 Dose:  3 ml


   Documented by: 


Diltiazem HCl (Diltiazem 50 Mg/10 Ml Sdv)  10 mg IVPUSH Q1H PRN


   PRN Reason: Tachycardia


   Last Admin: 10/09/21 10:36 Dose:  10 mg


   Documented by: 


Docusate Sodium (Docusate Sodium 100 Mg Cap)  100 mg PO Q12H PRN


   PRN Reason: Constipation


   Last Admin: 10/07/21 09:12 Dose:  100 mg


   Documented by: 


Enoxaparin Sodium (Enoxaparin 60 Mg/0.6 Ml Syringe)  60 mg SUBCUT Q12H [facility]


   Last Admin: 10/09/21 09:05 Dose:  60 mg


   Documented by: 


Furosemide (Furosemide 20 Mg/2 Ml Vial)  20 mg IVPUSH DAILY [facility]


   Last Admin: 10/09/21 09:05 Dose:  20 mg


   Documented by: 


Guaifenesin (Guaifenesin 600 Mg Tab.Er)  600 mg PO BID [facility]


   Last Admin: 10/09/21 09:05 Dose:  600 mg


   Documented by: 


Sodium Chloride (Normal Saline)  250 mls @ 40 mls/hr IV ASDIRECTED [facility]


   Last Admin: 10/08/21 15:51 Dose:  40 mls/hr


   Documented by: 


Methylprednisolone Sodium Succinate (Methylprednisolone Sodium Succinate 40 Mg/1

Ml Sdv)  60 mg IVPUSH Q8H [facility]


   Last Admin: 10/09/21 07:29 Dose:  60 mg


   Documented by: 


Ondansetron HCl (Ondansetron 4 Mg/2 Ml Sdv)  4 mg IV Q6H PRN


   PRN Reason: Nausea/Vomiting


Sodium Chloride (Sodium Chloride 0.9% 10 Ml Syringe)  10 ml FLUSH ASDIRECTED PRN


   PRN Reason: Keep Vein Open


   Last Admin: 10/04/21 18:12 Dose:  10 ml


   Documented by: 


Trimethoprim/Sulfamethoxazole (Sulfamethoxazole/Trimethoprim 800-160 Mg Tab)  1 

tab PO BID [facility]


   Last Admin: 10/09/21 09:05 Dose:  1 tab


   Documented by: 


Warfarin Sodium (Pharmacy To Dose - Warfarin)  1 dose .XX ASDIRECTED PRN


   PRN Reason: RX TOD DOSE WARFARIN


Warfarin Sodium (Warfarin 4 Mg Tab)  4 mg PO QPM [facility]


   Stop: 10/09/21 18:01





Discontinued Medications





Albuterol (Albuterol 0.083% 2.5 Mg/3 Ml Neb Soln)  2.5 mg NEB ONETIME ONE


   Stop: 10/04/21 15:19


   Last Admin: 10/04/21 15:47 Dose:  2.5 mg


   Documented by: 


Albuterol/Ipratropium (Albuterol/Ipratropium 3.0-0.5 Mg/3 Ml Neb Soln)  3 ml NEB

ONETIME ONE


   Stop: 10/05/21 08:21


   Last Admin: 10/05/21 08:37 Dose:  3 ml


   Documented by: 


Apixaban (Apixaban 5 Mg Tab)  10 mg PO BID [facility]


   Stop: 10/12/21 21:01


   Last Admin: 10/07/21 09:13 Dose:  10 mg


   Documented by: 


Enoxaparin Sodium (Enoxaparin 40 Mg/0.4 Ml Syringe)  40 mg SUBCUT DAILY [facility]


   Last Admin: 10/05/21 14:49 Dose:  40 mg


   Documented by: 


Enoxaparin Sodium (Enoxaparin 60 Mg/0.6 Ml Syringe)  20 mg SUBCUT ONETIME ONE


   Stop: 10/05/21 16:31


   Last Admin: 10/05/21 16:53 Dose:  20 mg


   Documented by: 


Furosemide (Furosemide 40 Mg/4 Ml Vial)  40 mg IVPUSH NOW ONE


   Stop: 10/05/21 20:04


   Last Admin: 10/05/21 21:33 Dose:  40 mg


   Documented by: 


Furosemide (Furosemide 20 Mg/2 Ml Vial)  20 mg IVPUSH TIDMEALS [facility]


   Last Admin: 10/08/21 06:34 Dose:  20 mg


   Documented by: 


Furosemide (Furosemide 20 Mg/2 Ml Vial)  20 mg IVPUSH DAILY [facility]


Sodium Chloride (Normal Saline)  100 mls @ 60 mls/min IV ASDIRECTED [facility]


   Last Admin: 10/04/21 18:12 Dose:  60 mls/min


   Documented by: 


Sodium Chloride (Normal Saline)  1,000 mls @ 250 mls/hr IV ASDIRECTED [facility]


   Last Admin: 10/04/21 22:14 Dose:  250 mls/hr


   Documented by: 


Ceftriaxone Sodium 1 gm/ (Sodium Chloride)  100 mls @ 200 mls/hr IV ONETIME ONE


   Stop: 10/05/21 09:25


   Last Admin: 10/05/21 09:20 Dose:  200 mls/hr


   Documented by: 


Vancomycin HCl 1 gm/ Sodium (Chloride)  250 mls @ 250 mls/hr IV Q18H [facility]


   Last Admin: 10/05/21 14:26 Dose:  Not Given


   Documented by: 


Vancomycin HCl 1 gm/ Sodium (Chloride)  250 mls @ 250 mls/hr IV Q18H [facility]


   Last Admin: 10/05/21 15:37 Dose:  Not Given


   Documented by: 


Piperacillin Sod/Tazobactam (Sod 4.5 gm/ Sodium Chloride)  100 mls @ 200 mls/hr 

IV ONETIME ONE


   Stop: 10/05/21 14:59


   Last Admin: 10/05/21 14:19 Dose:  200 mls/hr


   Documented by: 


Piperacillin Sod/Tazobactam (Sod 4.5 gm/ Sodium Chloride)  100 mls @ 25 mls/hr 

IV Q8H [facility]


   Last Admin: 10/08/21 06:34 Dose:  25 mls/hr


   Documented by: 


Vancomycin HCl 1 gm/ Sodium (Chloride)  250 mls @ 250 mls/hr IV Q18H [facility]


   Stop: 10/07/21 23:59


   Last Admin: 10/07/21 22:00 Dose:  250 mls/hr


   Documented by: 


Sodium Chloride (Normal Saline)  1,000 mls @ 100 mls/hr IV ASDIRECTED [facility]


Vancomycin HCl 1 gm/ Sodium (Chloride)  250 mls @ 250 mls/hr IV Q12H [facility]


Potassium Chloride 10 meq/ (Premix)  100 mls @ 100 mls/hr IV Q1H [facility]


   Stop: 10/08/21 12:59


   Last Admin: 10/08/21 15:37 Dose:  100 mls/hr


   Documented by: 


Iopamidol (Iopamidol 755 Mg/Ml 100 Ml Bottle)  100 ml IVPUSH ONETIME ONE


   Stop: 10/04/21 18:12


   Last Admin: 10/04/21 18:12 Dose:  100 ml


   Documented by: 


Methylprednisolone Sodium Succinate (Methylprednisolone Sodium Succinate 125 

Mg/2 Ml Sdv)  125 mg IVPUSH ONETIME ONE


   Stop: 10/05/21 08:21


   Last Admin: 10/05/21 08:25 Dose:  125 mg


   Documented by: 


Potassium Chloride (Potassium Chloride 10 Meq Tab.Er)  10 meq PO TID [facility]


   Last Admin: 10/07/21 22:00 Dose:  10 meq


   Documented by: 


Potassium Chloride (Potassium Chloride 20 Meq Tab.Er)  20 meq PO ONETIME ONE


   Stop: 10/06/21 07:21


   Last Admin: 10/06/21 08:37 Dose:  20 meq


   Documented by: 


Sodium Chloride (Sodium Chloride 0.9% 10 Ml Sdv)  10 ml FLUSH ONETIME ONE


   Stop: 10/04/21 18:12


   Last Admin: 10/04/21 20:31 Dose:  10 ml


   Documented by: 


Vancomycin HCl (Pharmacy To Dose - Vancomycin)  1 dose .XX ASDIRECTED [facility]


Warfarin Sodium (Warfarin 4 Mg Tab)  4 mg PO QPM [facility]


   Stop: 10/07/21 18:01


   Last Admin: 10/07/21 18:08 Dose:  4 mg


   Documented by: 


Warfarin Sodium (Warfarin 4 Mg Tab)  4 mg PO QPM [facility]


   Stop: 10/08/21 18:01


   Last Admin: 10/08/21 18:36 Dose:  4 mg


   Documented by: 











- Exam


Quality Assessment: Supplemental Oxygen


General: Alert, Oriented


HEENT: Pupils Equal, Mucous Membr. Moist/Pink


Neck: Supple


Lungs: Clear to Auscultation.  No: Normal Respiratory Effort (Increased 

respiratory rate)


Cardiovascular: Regular Rate, Regular Rhythm


GI/Abdominal Exam: Normal Bowel Sounds, Soft, Non-Tender, No Distention


Extremities: Normal Inspection, Normal Capillary Refill


Skin: Warm, Dry, Intact


Psy/Mental Status: Alert, Normal Affect, Normal Mood





- Patient Data


Lab Results Last 24 hrs: 


                         Laboratory Results - last 24 hr











  10/08/21 10/09/21 10/09/21 Range/Units





  05:01 05:37 05:37 


 


WBC   10.99 H   (3.98-10.04)  K/mm3


 


RBC   3.97 L   (3.98-5.22)  M/mm3


 


Hgb   12.0   (11.2-15.7)  gm/dl


 


Hct   35.4   (34.1-44.9)  %


 


MCV   89.2   (79.4-94.8)  fl


 


MCH   30.2   (25.6-32.2)  pg


 


MCHC   33.9   (32.2-35.5)  g/dl


 


RDW Std Deviation   46.6 H   (36.4-46.3)  fL


 


Plt Count   415 H D   (182-369)  K/mm3


 


MPV   10.1   (9.4-12.3)  fl


 


Neut % (Auto)   84.8 H   (34.0-71.1)  %


 


Lymph % (Auto)   10.3 L   (19.3-51.7)  %


 


Mono % (Auto)   3.5 L   (4.7-12.5)  %


 


Eos % (Auto)   0.1 L   (0.7-5.8)  


 


Baso % (Auto)   0.1   (0.1-1.2)  %


 


Neut # (Auto)   9.33 H   (1.56-6.13)  K/mm3


 


Lymph # (Auto)   1.13 L   (1.18-3.74)  K/mm3


 


Mono # (Auto)   0.38 H   (0.24-0.36)  K/mm3


 


Eos # (Auto)   0.01 L   (0.04-0.36)  K/mm3


 


Baso # (Auto)   0.01   (0.01-0.08)  K/mm3


 


PT     (9.7-12.0)  SECONDS


 


INR     


 


Sodium    134 L  (136-145)  mEq/L


 


Potassium    3.7  (3.5-5.1)  mEq/L


 


Chloride    100  ()  mEq/L


 


Carbon Dioxide    23  (21-32)  mEq/L


 


Anion Gap    14.7  (5-15)  


 


BUN    41 H  (7-18)  mg/dL


 


Creatinine    1.4 H  (0.55-1.02)  mg/dL


 


Est Cr Clr Drug Dosing    24.59  mL/min


 


Estimated GFR (MDRD)    36  (>60)  mL/min


 


BUN/Creatinine Ratio    29.3 H  (14-18)  


 


Glucose    142 H  (70-99)  mg/dL


 


Calcium    8.9  (8.5-10.1)  mg/dL


 


Magnesium    2.1  (1.8-2.4)  mg/dL


 


Total Bilirubin    0.3  (0.2-1.0)  mg/dL


 


AST    24  (15-37)  U/L


 


ALT    22  (14-59)  U/L


 


Alkaline Phosphatase    85  ()  U/L


 


C-Reactive Protein    12.7 H*  (<1.0)  mg/dL


 


Total Protein    6.8  (6.4-8.2)  g/dl


 


Albumin    1.9 L  (3.4-5.0)  g/dl


 


Globulin    4.9  gm/dL


 


Albumin/Globulin Ratio    0.4 L  (1-2)  


 


Procalcitonin  0.18 H    ng/mL














  10/09/21 Range/Units





  05:37 


 


WBC   (3.98-10.04)  K/mm3


 


RBC   (3.98-5.22)  M/mm3


 


Hgb   (11.2-15.7)  gm/dl


 


Hct   (34.1-44.9)  %


 


MCV   (79.4-94.8)  fl


 


MCH   (25.6-32.2)  pg


 


MCHC   (32.2-35.5)  g/dl


 


RDW Std Deviation   (36.4-46.3)  fL


 


Plt Count   (182-369)  K/mm3


 


MPV   (9.4-12.3)  fl


 


Neut % (Auto)   (34.0-71.1)  %


 


Lymph % (Auto)   (19.3-51.7)  %


 


Mono % (Auto)   (4.7-12.5)  %


 


Eos % (Auto)   (0.7-5.8)  


 


Baso % (Auto)   (0.1-1.2)  %


 


Neut # (Auto)   (1.56-6.13)  K/mm3


 


Lymph # (Auto)   (1.18-3.74)  K/mm3


 


Mono # (Auto)   (0.24-0.36)  K/mm3


 


Eos # (Auto)   (0.04-0.36)  K/mm3


 


Baso # (Auto)   (0.01-0.08)  K/mm3


 


PT  20.1 H D  (9.7-12.0)  SECONDS


 


INR  1.85  


 


Sodium   (136-145)  mEq/L


 


Potassium   (3.5-5.1)  mEq/L


 


Chloride   ()  mEq/L


 


Carbon Dioxide   (21-32)  mEq/L


 


Anion Gap   (5-15)  


 


BUN   (7-18)  mg/dL


 


Creatinine   (0.55-1.02)  mg/dL


 


Est Cr Clr Drug Dosing   mL/min


 


Estimated GFR (MDRD)   (>60)  mL/min


 


BUN/Creatinine Ratio   (14-18)  


 


Glucose   (70-99)  mg/dL


 


Calcium   (8.5-10.1)  mg/dL


 


Magnesium   (1.8-2.4)  mg/dL


 


Total Bilirubin   (0.2-1.0)  mg/dL


 


AST   (15-37)  U/L


 


ALT   (14-59)  U/L


 


Alkaline Phosphatase   ()  U/L


 


C-Reactive Protein   (<1.0)  mg/dL


 


Total Protein   (6.4-8.2)  g/dl


 


Albumin   (3.4-5.0)  g/dl


 


Globulin   gm/dL


 


Albumin/Globulin Ratio   (1-2)  


 


Procalcitonin   ng/mL











Result Diagrams: 


                                 10/09/21 05:37





                                 10/09/21 05:37





Sepsis Event Note





- Evaluation


Sepsis Screening Result: Sepsis Risk





- Focused Exam


Vital Signs: 


                                   Vital Signs











  Temp Pulse Resp BP Pulse Ox Pulse Ox Pulse Ox


 


 10/09/21 10:02  96.4 F L  110 H  22 H  102/67  90 L  


 


 10/09/21 09:28       86 L  86 L


 


 10/09/21 09:16       89 L  89 L


 


 10/09/21 06:19  97.3 F  93  24 H  126/91 H  81 L  


 


 10/09/21 06:16       91 L 














- Problem List & Annotations


(1) Acute and chronic respiratory failure


SNOMED Code(s): 45082485


   Code(s): J96.20 - ACUTE AND CHR RESP FAILURE, UNSP W HYPOXIA OR HYPERCAPNIA  

Status: Acute   Priority: High   Current Visit: Yes   


Qualifiers: 


   Respiratory failure complication: hypoxia   Qualified Code(s): J96.21 - Acute

and chronic respiratory failure with hypoxia   





(2) Pulmonary fibrosis


SNOMED Code(s): 19116676


   Code(s): J84.10 - PULMONARY FIBROSIS, UNSPECIFIED   Status: Suspected   

Priority: High   Current Visit: Yes   





- Problem List Review


Problem List Initiated/Reviewed/Updated: Yes





- My Orders


Last 24 Hours: 


My Active Orders





10/08/21 23:09


Alum Hydrox/Mag Hydrox/Simeth [Mag-Al Plus]   30 ml PO Q4H PRN 





10/09/21 10:24


Diltiazem [Cardizem]   10 mg IVPUSH Q1H PRN 














- Assessment


Assessment:: 


Admission assessment - 10/5/2021


* 81-year-old female who presents to ED on 10/4/2021 with low oxygen saturations


* History of Mnire's disease, GERD, and prior Covid pneumonia


* Was hospitalized from 9/13/2021 through 9/27/2021 with COVID-19 pneumonia at 

  [facility] in [place]. 


* Discharged home on 4 L of oxygen with activity after completing 4 days of 

  remdesivir and 10 days of Decadron. 


* Of note patient was noted to have episodes of bradycardia and hypotension with

   heart rate in the 30s and 40s. She was noticed to have episodes of Mobitz 

  type I AV block and 2.5-second sinus pauses. It was believed this was due to 

  remdesivir however this continued after stopping. Patient was to follow-up 

  with EP and have a 2-week cardiac monitor after discharge. 


* Prior to presenting to the ED she was noted to have saturations in the low 

  50s. 


* Patient's  increased her oxygen to 8 L via nasal cannula. 


* Denies any recent fever, chills, nausea, vomiting, diarrhea, urinary symptoms,

   or smoking history. 


* Of note patient did have acute cystitis with an E. coli UTI well in the 

  hospital in [place] and completed 5 days of Rocephin there. 


* 12-lead EKG was obtained showing a sinus tachycardia at 109 bpm with a LAFB 

  and very mild ST elevation in V2 and V3. 


* Placed on a nonrebreather mask which improved her saturations to 90 to 91%. 


* Noted to be dyspneic and only able to complete a few word sentences. 


* Labs are obtained:


   * WBC of 8.57. 


   * Hemoglobin 13.0. 


   * Hematocrit 30.5. 


   * Platelet 250,000. 


   * Neutrophils are elevated 75.7%. 


   * D-dimer is very high at 8.16. 


   * Sodium is low at 129. 


   * Potassium 3.8. 


   * Chloride 94. 


   * Carbon dioxide 24. 


   * Anion gap is 14.8. 


   * BUN is 15. Creatinine 0.6. GFR greater than 60. 


   * Glucose is 126. 


   * Calcium 8.7. 


   * Magnesium 1.8. 


   * Total bilirubin 0.6. 


   * AST is 36, ALT 31, alkaline phosphatase 86. 


   * Troponin is elevated at 0.127-->0.156-->0.067. 


   * CRP is very high at 29.3. 


   * Protein 7.1. 


   * Albumin 2.1. 


   * proBNP is 3117


   * UA is obtained and is mildly positive with slightly cloudy urine, 1+ p

     rotein, 3+ ketones, 2+ occult blood, 1+ leukocyte esterase, 5-10 RBCs, 20-

     30 WBCs, rare WBC clumps, and moderate bacteria.


* ABG obtained in the left radial with a pH of 7.44. PCO2 of 30.9. PO2 of 58.0. 

  HCO3 of 20.5. O2 saturations 86%. Base excess is -2.3. Aa gradient is 15.0. 

  This is obtained while on nonrebreather at 15 L. 


* Facility was out of CPAP and BiPAP-> started on high flow O2 with saturations 

  in the low 90s. 


* CTA of the chest is obtained to rule out PE and shows:


   * 1. No findings of pulmonary embolism. 


   * 2. Minimal left-sided pleural effusion. 


   * 3. Diffuse emphysematous changes seen. Both lungs show diffuse increased 

     density uncertain how much of this represents diffuse fibrosis versus 

     possible superimposed pneumonia. Old comparison studies would be needed if 

     available. 


   * 4. 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 


   * 5. Other findings which are felt to be chronic as described above. 


* Chest x-ray shows scattered infiltrates bilaterally most prominent in the left

   lower lobe. 


* Plan was to transfer the patient for continued care due to elevated troponin 

  however [place] beds are noted to be available [place], or 

  [place]. 


* Noted to have a blood pressure of 80/61 and is given a 500 mill fluid bolus 

  over 2 hours. 


* Given 1 dose of Rocephin in the ED. She is also given 125 mg Solu-Medrol and a

   DuoNeb, which she reports greatly helped.


* Ultimately inpatient bed does open up at our facility and she is admitted to 

  the floor on telemetry for management of her hypoxia, suspected pneumonia, 

  elevated BNP, elevated troponin - likely demand ischemia, and questionable 

  UTI. 


* On floor bilateral lower extremity ultrasound shows thrombus within the left 

  mid and distal superficial, femoral, popliteal, posterior tibial, and peroneal

   veins.





10/6/2021


This is a 81 years old female post Covid infection who was hospitalized in 

[place] for approximately 2 weeks who presented to our ED with low saturations.

  On admission D-dimer was noted to be elevated however CTA was negative for any

 PE.  Lower extremity ultrasound did show a DVT in the left extremity and 

patient was started on 1 mg/kg Lovenox transitioning to 10 mg twice daily Eliq

uis today.  Patient has been requiring high flow 60 L and was weaned down to 80%

 FiO2 today.  Unfortunately given patient's history of recent Covid infection 

CTA was not very helpful in differentiating pulmonary cause.  Given recent 

hospitalization there are concerns of a secondary bacterial infection.  WBC has 

been WNL however neutrophils are elevated.  Procalcitonin was obtained and was 

0.29.  Lactic acid yesterday was 1.0.  Patient was noted to be hypotensive in 

the emergency room and was given 2 fluid boluses.  She was also started on 

fluids over concerns of sepsis.  UA was positive and urine culture is growing 

out greater than 100,000 CFU's of gram-negative rods thus far.  She does have a 

history of a E. coli UTI treated with Rocephin in [place].  Patient denies any 

urinary symptoms.  proBNP was elevated.  Echocardiogram was obtained on 

10/5/2021  And interpreted as: " 1.  The left ventricular internal cavity size 

is normal. 2.  Normal left ventricular systolic function. 3.  Left ventricular 

ejection fraction, by visual estimation, is 55 to 60%. 4.  No regional wall 

motion abnormalities. 5.  Mild proximal septal hypertrophy. 6.  Aortic valve is 

tricuspid. 7.  Mild mitral valve regurgitation. 8.  Trace tricuspid valve 

regurgitation. 9.  Mild pulmonic valve regurgitation. 10.  The inferior vena 

cava is normal. 11.  The right ventricular systolic pressure is mildly elevated 

at 32.2 mmHg."  Patient was started on 20 mg IV push Lasix 3 times daily 

yesterday and has had over 1 L output.  Potassium today is down to 3.3 and this 

is being supplemented.  Sodium 137.  Carbon dioxide 24.  Anion gap is 13.3.  BUN

 is 19.  Creatinine 0.6.  GFR greater than 60.  Glucose is 146.  Calcium 8.4.  

Magnesium is 1.8.  Total bilirubin 0.5.  AST is 26, ALT 26, alkaline phosphatase

 90.  Troponin today was down to 0.031.  CRP is down to 24.2.  Albumin is down 

to 1.8.  Mycoplasma and strep pneumonia were both checked and were negative.  

Respiratory viral panel was obtained and was negative however patient did return

 positive for Covid, which is unsurprising given the patient's recent known 

Covid infection.  We will continue current treatment plan.  It is felt it is 

prudent to continue IV antibiotics for now given patient's symptoms and lab 

results.  We will continue diuresis and Eliquis for DVT treatment.





10/7/2021


This is a 81-year-old female post Covid infection who was admitted for suspected

 pneumonia, pulmonary fibrosis, myocarditis, DVT, and apparent UTI.  Labs today 

show WBC of 8.94.  Hemoglobin is 11.1.  Neutrophils are elevated 77.7%.  Smear 

review feels slight toxic granulation and abnormal RBC morphology.  Sodium today

 is 140.  Potassium 3.7.  Chloride 103.  Carbon dioxide 20.  Anion gap is 12.7. 

 BUN is 25.  Creatinine 0.8.  GFR greater than 60.  Magnesium is 1.9.  Bilirubin

 0.5.  AST is 24, ALT 29, alkaline phosphatase is 90.  CRP is down to 12.5.  

Albumin is 1.8.  Patient does report that she feels quite a bit better today.  

She is down on high flow to 55 L with an FiO2 of 70% with saturations of upper 

80s to low 90s.  Urine culture continues to show greater than 100,000 CFU's of 

E. coli.  Sensitivities are pending.  Blood cultures have returned negative.  

Strep pneumonia is negative.  Discussed plan of care with Dr. Newsome who 

recommends patient receive 1 more dose of Lasix and then either discontinue or 

continue with 20 mg daily.  We will continue vancomycin and Zosyn due to 

concerns over possible pneumonia.  Unfortunately today patient reported that her

 insurance company tells her Eliquis will be $600 for 30-day supply.  She states

 she cannot afford this.  Apparently Xarelto is around the same pricing per case

 management.  We will therefore discontinue Eliquis start patient on 1 mg/kg 

Lovenox twice daily and start warfarin with pharmacy to dose.  Patient has had 

no fevers.  We will continue current treatment plan and hope to wean patient off

 of high flow.  Unknown length of stay due to severity of symptoms.





10/8/2021


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

 fibrosis, suspected myocarditis, DVT and UTI.  Labs today show WBC of 8.33.  

Hemoglobin is 11.7.  Neutrophils are 70.6.  INR is 1.20.  Sodium is 137.  

Potassium 3.2.  Chloride 98.  Carbon dioxide 29.  Anion gap is 13.2.  BUN is 29.

  Creatinine 1.0.  GFR 53.  Glucose 101.  CRP is up to 16.2.  Albumin is 1.9.  

Discussed plan of care with Dr. Russ, attending hospitalist.  We will at this 

time discontinue vancomycin and Zosyn and switch patient to Bactrim DS twice 

daily based on E. coli urine sensitivities.  We will repeat a procalcitonin.  We

 will also decrease Lasix to 20 mg IV push daily.  Patient's potassium will be 

supplemented.  Pharmacy continues to dose warfarin and patient remains on 1 

mg/kg twice daily Lovenox bridging.  Will start patient on 60 mg every 8 hour IV

 push methylprednisolone.  Patient has been reporting difficulty with swallowing

 pills.  Because of this we will order an SLP evaluation.  We have made some 

improvement with her high flow she is now at 55 L with an FiO2 of 65%.  We will 

continue to wean as patient tolerates.  Unknown length of stay due to severity 

of symptoms.





10/9/2021


Seven 11-year-old female with post Covid admitted for suspected pneumonia, 

pulmonary fibrosis, suspected myocarditis, DVT, and UTI developed A. fib with 

RVR with rates in the 110s to 120s+ this morning.  This required increasing 

oxygen support with high flow nasal cannula at 60 L with FiO2 of 95% and 

nonrebreather mask.  Patient continues to be DNR/DNI.  She was given 1 dose of 

Cardizem 10 mg IV x1.  Blood pressure was stable with systolic in the 120s.  

After rate control was achieved we were able to remove the nonrebreather mask.  

CRP continues to be elevated at 12.7.  She was started on methylprednisolone 

yesterday and switched vancomycin and Zosyn to Bactrim for her UTI.  

Procalcitonin did return at 0.18 reassuring us that she does not have a severe 

bacterial sepsis.  Patient is getting warfarin secondary to DVT with INR today 

at 1.85.  She continues on Lovenox until therapeutic INR.


EKG shows diffuse T wave inversion consistent with heart strain.  Troponin was 

drawn and increased to 0.14.  Also suggesting some component of heart strain 

versus infarction.  EKG also demonstrates new onset atrial fibrillation.  She is

 not a candidate for transfer at this time and is already on full dose Lovenox 

and INR is 1.8 as we titrate up her warfarin.





- Plan


Plan:: 


Pneumonia - suspected


Dyspnea


Hypoxia


History of COVID-19


On home oxygen therapy


Acute on chronic respiratory failure 


Pulmonary fibrosis -on CT scan


* Droplet isolation (airborne/contact while on high flow O2)


* O2 as needed to keep saturation greater than 90%


* I-S/Acapella


* Discontinued zosyn and vancomycin on 10/8/2021


* High flow oxygen as directed


* 4 times daily scheduled DuoNebs


* Every 2 hours as needed albuterol nebulizer


* Consult RT


* Telemetry


* Blood cultures negative thus far 


* Continuous pulse oximetry


* Mucinex BID


* Recommend PFT after recovery due to emphysematous changes noted in CTA


* Recommend pulmonology follow-up after discharge


* Repeat procalcitonin 0.18 on 10/8/2021


* Continue solu-medrol 60mg TID


* Continue to follow CRP








DVT


Elevated d-dimer


* CTA in ED negative for PE


* Discontinue Eliquis as patient reports $600 for 30 day supply


* Warfarin with pharmacy to dose


* Lovenox 60mg (1mg/kg) BID as bridging 





Myocarditis - suspected 


Elevated troponin 


Elevated brain natriuretic peptide (BNP) level


* Troponin increased to 0.14 with new onset atrial fibrillation


* Echocardiogram obtained as above 


* Monitor patient's weight


* Strict I&O's


* Telemetry


* Hold Lasix tomorrow morning


* Supplement potassium as needed 


* Monitor CRP





UTI (urinary tract infection)


* UA weakly positivepatient was treated for E. coli UTI with Rocephin in 

  [place]


* Urine culture showing >100cfu E. Coli, with good sensitivities to Bactrim DS


* Blood cultures negative thus far 


* Switch from zosyn/vancomycin to Bactrim DS BID PO





GERD (gastroesophageal reflux disease)


* No acute concerns


* Monitor 





Meniere disease


* No acute concerns


* Monitor





Hypokalemiaresolved


* Supplement as needed





Hypotension - resolved


* Noted in ED and multiple fluid boluses given


* Monitor 





Dysphagia


* Patient reports difficulty swallowing pills


* SLP evaluation





Code status: DNR/DNI


PCP: Dr. Monroe in [place]


DVT prophylaxis: Lovenox


Disposition: Patient admitted to the floor for management and further work-up of

 suspected pneumonia, hypoxia, elevated troponin, elevated D-dimer, and weak 

UTI.  





Length of stay greater than 96 hours due to need for continued treatment.





Prognosis poor.

## 2021-10-09 NOTE — PCM.EKG
** #1 Interpretation


EKG Date: 10/09/21


Time: 15:30


Rhythm: A-Fib


Rate (Beats/Min): 91


Axis: LAD-Left Axis Deviation (Left anterior fascicular block)


P-Wave: Absent


QRS: Normal


ST-T: Normal


QT: Prolonged (649 ms)


Comparison: Change From Previous EKG (New T wave inversion in all leads except 

lead III)


EKG Interpretation Comments: 





Significant change in EKG from prior EKG on 10/4/2021.


New inverted T waves suggesting heart strain

## 2021-10-10 RX ADMIN — SODIUM CHLORIDE SCH MG: 0.9 INJECTION, SOLUTION INTRAVENOUS at 22:00

## 2021-10-10 RX ADMIN — SODIUM CHLORIDE SCH MG: 0.9 INJECTION, SOLUTION INTRAVENOUS at 06:00

## 2021-10-10 RX ADMIN — SODIUM CHLORIDE SCH MG: 0.9 INJECTION, SOLUTION INTRAVENOUS at 15:05

## 2021-10-10 NOTE — PCM.PN
- General Info


Date of Service: 10/10/21


Admission Dx/Problem (Free Text): 


                           Admission Diagnosis/Problem





Admission Diagnosis/Problem      Hypoxia








Subjective Update: 





81-year-old post Covid with pulmonary fibrosis secondary to COVID-19 pneumonia. 

Oxygen saturations marginally better overnight with oxygen saturations in the 

low 90s at 60 L and 90% FiO2.  Troponin decreased back down to normal range at 

0.056 from a slight elevation yesterday of 0.14.  INR has increased to 4.  BUN 

continues to increase.  Patient is feeling much better than yesterday.





Functional Status: Reports: Pain Controlled





- Review of Systems


General: Reports: Fatigue


HEENT: Reports: No Symptoms


Pulmonary: Reports: Shortness of Breath, Cough


Cardiovascular: Reports: No Symptoms


Gastrointestinal: Reports: No Symptoms


Musculoskeletal: Reports: No Symptoms





- Patient Data


Vitals - Most Recent: 


                                Last Vital Signs











Temp  97.5 F   10/10/21 09:09


 


Pulse  94   10/10/21 09:09


 


Resp  18   10/10/21 09:09


 


BP  106/68   10/10/21 09:09


 


Pulse Ox  93 L  10/10/21 10:17











Weight - Most Recent: 129 lb 1.6 oz


I&O - Last 24 Hours: 


                                 Intake & Output











 10/09/21 10/10/21 10/10/21





 22:59 06:59 14:59


 


Intake Total 300 200 


 


Output Total 500 325 


 


Balance -200 -125 











Lab Results Last 24 Hours: 


                         Laboratory Results - last 24 hr











  10/09/21 10/10/21 10/10/21 Range/Units





  16:05 06:31 06:31 


 


WBC    12.48 H  (3.98-10.04)  K/mm3


 


RBC    4.23  (3.98-5.22)  M/mm3


 


Hgb    12.7  (11.2-15.7)  gm/dl


 


Hct    37.7  (34.1-44.9)  %


 


MCV    89.1  (79.4-94.8)  fl


 


MCH    30.0  (25.6-32.2)  pg


 


MCHC    33.7  (32.2-35.5)  g/dl


 


RDW Std Deviation    46.7 H  (36.4-46.3)  fL


 


Plt Count    446 H  (182-369)  K/mm3


 


MPV    9.8  (9.4-12.3)  fl


 


Neut % (Auto)    84.9 H  (34.0-71.1)  %


 


Lymph % (Auto)    8.9 L  (19.3-51.7)  %


 


Mono % (Auto)    5.1  (4.7-12.5)  %


 


Eos % (Auto)    0 L  (0.7-5.8)  


 


Baso % (Auto)    0.1  (0.1-1.2)  %


 


Neut # (Auto)    10.59 H  (1.56-6.13)  K/mm3


 


Lymph # (Auto)    1.11 L  (1.18-3.74)  K/mm3


 


Mono # (Auto)    0.64 H  (0.24-0.36)  K/mm3


 


Eos # (Auto)    0.00 L  (0.04-0.36)  K/mm3


 


Baso # (Auto)    0.01  (0.01-0.08)  K/mm3


 


PT   41.9 H D   (9.7-12.0)  SECONDS


 


INR   3.98   


 


Sodium     (136-145)  mEq/L


 


Potassium     (3.5-5.1)  mEq/L


 


Chloride     ()  mEq/L


 


Carbon Dioxide     (21-32)  mEq/L


 


Anion Gap     (5-15)  


 


BUN     (7-18)  mg/dL


 


Creatinine     (0.55-1.02)  mg/dL


 


Est Cr Clr Drug Dosing     mL/min


 


Estimated GFR (MDRD)     (>60)  mL/min


 


BUN/Creatinine Ratio     (14-18)  


 


Glucose     (70-99)  mg/dL


 


Calcium     (8.5-10.1)  mg/dL


 


Magnesium     (1.8-2.4)  mg/dL


 


Total Bilirubin     (0.2-1.0)  mg/dL


 


AST     (15-37)  U/L


 


ALT     (14-59)  U/L


 


Alkaline Phosphatase     ()  U/L


 


Troponin I  0.141 H*    (0.00-0.056)  ng/mL


 


Total Protein     (6.4-8.2)  g/dl


 


Albumin     (3.4-5.0)  g/dl


 


Globulin     gm/dL


 


Albumin/Globulin Ratio     (1-2)  














  10/10/21 Range/Units





  06:31 


 


WBC   (3.98-10.04)  K/mm3


 


RBC   (3.98-5.22)  M/mm3


 


Hgb   (11.2-15.7)  gm/dl


 


Hct   (34.1-44.9)  %


 


MCV   (79.4-94.8)  fl


 


MCH   (25.6-32.2)  pg


 


MCHC   (32.2-35.5)  g/dl


 


RDW Std Deviation   (36.4-46.3)  fL


 


Plt Count   (182-369)  K/mm3


 


MPV   (9.4-12.3)  fl


 


Neut % (Auto)   (34.0-71.1)  %


 


Lymph % (Auto)   (19.3-51.7)  %


 


Mono % (Auto)   (4.7-12.5)  %


 


Eos % (Auto)   (0.7-5.8)  


 


Baso % (Auto)   (0.1-1.2)  %


 


Neut # (Auto)   (1.56-6.13)  K/mm3


 


Lymph # (Auto)   (1.18-3.74)  K/mm3


 


Mono # (Auto)   (0.24-0.36)  K/mm3


 


Eos # (Auto)   (0.04-0.36)  K/mm3


 


Baso # (Auto)   (0.01-0.08)  K/mm3


 


PT   (9.7-12.0)  SECONDS


 


INR   


 


Sodium  135 L  (136-145)  mEq/L


 


Potassium  3.8  (3.5-5.1)  mEq/L


 


Chloride  101  ()  mEq/L


 


Carbon Dioxide  24  (21-32)  mEq/L


 


Anion Gap  13.8  (5-15)  


 


BUN  49 H  (7-18)  mg/dL


 


Creatinine  1.4 H  (0.55-1.02)  mg/dL


 


Est Cr Clr Drug Dosing  24.59  mL/min


 


Estimated GFR (MDRD)  36  (>60)  mL/min


 


BUN/Creatinine Ratio  35.0 H  (14-18)  


 


Glucose  153 H  (70-99)  mg/dL


 


Calcium  9.1  (8.5-10.1)  mg/dL


 


Magnesium  2.3  (1.8-2.4)  mg/dL


 


Total Bilirubin  0.3  (0.2-1.0)  mg/dL


 


AST  18  (15-37)  U/L


 


ALT  23  (14-59)  U/L


 


Alkaline Phosphatase  76  ()  U/L


 


Troponin I  0.056  (0.00-0.056)  ng/mL


 


Total Protein  6.9  (6.4-8.2)  g/dl


 


Albumin  2.1 L  (3.4-5.0)  g/dl


 


Globulin  4.8  gm/dL


 


Albumin/Globulin Ratio  0.4 L  (1-2)  











Med Orders - Current: 


                               Current Medications





Al Hydroxide/Mg Hydroxide (Aluminum Hydroxide/Magnesium Hydroxide/Simethicone 

Susp 30 Ml Cup)  30 ml PO Q4H PRN


   PRN Reason: Heartburn


   Last Admin: 10/08/21 23:27 Dose:  30 ml


   Documented by: 


Albuterol (Albuterol 0.083% 2.5 Mg/3 Ml Neb Soln)  2.5 mg NEB Q2H PRN


   PRN Reason: Shortness Of Breath/wheezing


Albuterol/Ipratropium (Albuterol/Ipratropium 3.0-0.5 Mg/3 Ml Neb Soln)  3 ml NEB

QIDRT [facility]


   Last Admin: 10/10/21 10:03 Dose:  3 ml


   Documented by: 


Aspirin (Aspirin 81 Mg Tab.Ec)  81 mg PO DAILY [facility]


   Last Admin: 10/10/21 09:04 Dose:  81 mg


   Documented by: 


Diltiazem HCl (Diltiazem 50 Mg/10 Ml Sdv)  10 mg IVPUSH Q1H PRN


   PRN Reason: Tachycardia


   Last Admin: 10/09/21 10:36 Dose:  10 mg


   Documented by: 


Docusate Sodium (Docusate Sodium 100 Mg Cap)  100 mg PO Q12H PRN


   PRN Reason: Constipation


   Last Admin: 10/07/21 09:12 Dose:  100 mg


   Documented by: 


Guaifenesin (Guaifenesin 600 Mg Tab.Er)  600 mg PO BID [facility]


   Last Admin: 10/10/21 09:12 Dose:  600 mg


   Documented by: 


Sodium Chloride (Normal Saline)  250 mls @ 40 mls/hr IV ASDIRECTED [facility]


   Last Admin: 10/08/21 15:51 Dose:  40 mls/hr


   Documented by: 


Methylprednisolone Sodium Succinate (Methylprednisolone Sodium Succinate 40 Mg/1

Ml Sdv)  60 mg IVPUSH Q8H [facility]


   Last Admin: 10/10/21 06:00 Dose:  60 mg


   Documented by: 


Metoprolol Tartrate (Metoprolol Tartrate 25 Mg Tab)  25 mg PO Q12H [facility]


   Last Admin: 10/10/21 09:04 Dose:  25 mg


   Documented by: 


Ondansetron HCl (Ondansetron 4 Mg/2 Ml Sdv)  4 mg IV Q6H PRN


   PRN Reason: Nausea/Vomiting


Sodium Chloride (Sodium Chloride 0.9% 10 Ml Syringe)  10 ml FLUSH ASDIRECTED PRN


   PRN Reason: Keep Vein Open


   Last Admin: 10/04/21 18:12 Dose:  10 ml


   Documented by: 


Trimethoprim/Sulfamethoxazole (Sulfamethoxazole/Trimethoprim 800-160 Mg Tab)  1 

tab PO BID [facility]


   Last Admin: 10/10/21 09:04 Dose:  1 tab


   Documented by: 


Warfarin Sodium (Pharmacy To Dose - Warfarin)  1 dose .XX ASDIRECTED PRN


   PRN Reason: RX TOD DOSE WARFARIN


Warfarin Sodium (Warfarin 1 Mg Tab)  1 mg PO QPM [facility]





Discontinued Medications





Albuterol (Albuterol 0.083% 2.5 Mg/3 Ml Neb Soln)  2.5 mg NEB ONETIME ONE


   Stop: 10/04/21 15:19


   Last Admin: 10/04/21 15:47 Dose:  2.5 mg


   Documented by: 


Albuterol/Ipratropium (Albuterol/Ipratropium 3.0-0.5 Mg/3 Ml Neb Soln)  3 ml NEB

ONETIME ONE


   Stop: 10/05/21 08:21


   Last Admin: 10/05/21 08:37 Dose:  3 ml


   Documented by: 


Apixaban (Apixaban 5 Mg Tab)  10 mg PO BID [facility]


   Stop: 10/12/21 21:01


   Last Admin: 10/07/21 09:13 Dose:  10 mg


   Documented by: 


Diltiazem HCl (Diltiazem Ir 30 Mg Tab)  30 mg PO Q8HR [facility]


   Last Admin: 10/10/21 05:51 Dose:  30 mg


   Documented by: 


Enoxaparin Sodium (Enoxaparin 40 Mg/0.4 Ml Syringe)  40 mg SUBCUT DAILY [facility]


   Last Admin: 10/05/21 14:49 Dose:  40 mg


   Documented by: 


Enoxaparin Sodium (Enoxaparin 60 Mg/0.6 Ml Syringe)  20 mg SUBCUT ONETIME ONE


   Stop: 10/05/21 16:31


   Last Admin: 10/05/21 16:53 Dose:  20 mg


   Documented by: 


Enoxaparin Sodium (Enoxaparin 60 Mg/0.6 Ml Syringe)  60 mg SUBCUT Q12H [facility]


   Last Admin: 10/09/21 21:22 Dose:  60 mg


   Documented by: 


Furosemide (Furosemide 40 Mg/4 Ml Vial)  40 mg IVPUSH NOW ONE


   Stop: 10/05/21 20:04


   Last Admin: 10/05/21 21:33 Dose:  40 mg


   Documented by: 


Furosemide (Furosemide 20 Mg/2 Ml Vial)  20 mg IVPUSH TIDMEALS [facility]


   Last Admin: 10/08/21 06:34 Dose:  20 mg


   Documented by: 


Furosemide (Furosemide 20 Mg/2 Ml Vial)  20 mg IVPUSH DAILY [facility]


Furosemide (Furosemide 20 Mg/2 Ml Vial)  20 mg IVPUSH DAILY [facility]


   Last Admin: 10/09/21 09:05 Dose:  20 mg


   Documented by: 


Sodium Chloride (Normal Saline)  100 mls @ 60 mls/min IV ASDIRECTED [facility]


   Last Admin: 10/04/21 18:12 Dose:  60 mls/min


   Documented by: 


Sodium Chloride (Normal Saline)  1,000 mls @ 250 mls/hr IV ASDIRECTED [facility]


   Last Admin: 10/04/21 22:14 Dose:  250 mls/hr


   Documented by: 


Ceftriaxone Sodium 1 gm/ (Sodium Chloride)  100 mls @ 200 mls/hr IV ONETIME ONE


   Stop: 10/05/21 09:25


   Last Admin: 10/05/21 09:20 Dose:  200 mls/hr


   Documented by: 


Vancomycin HCl 1 gm/ Sodium (Chloride)  250 mls @ 250 mls/hr IV Q18H [facility]


   Last Admin: 10/05/21 14:26 Dose:  Not Given


   Documented by: 


Vancomycin HCl 1 gm/ Sodium (Chloride)  250 mls @ 250 mls/hr IV Q18H [facility]


   Last Admin: 10/05/21 15:37 Dose:  Not Given


   Documented by: 


Piperacillin Sod/Tazobactam (Sod 4.5 gm/ Sodium Chloride)  100 mls @ 200 mls/hr 

IV ONETIME ONE


   Stop: 10/05/21 14:59


   Last Admin: 10/05/21 14:19 Dose:  200 mls/hr


   Documented by: 


Piperacillin Sod/Tazobactam (Sod 4.5 gm/ Sodium Chloride)  100 mls @ 25 mls/hr 

IV Q8H [facility]


   Last Admin: 10/08/21 06:34 Dose:  25 mls/hr


   Documented by: 


Vancomycin HCl 1 gm/ Sodium (Chloride)  250 mls @ 250 mls/hr IV Q18H [facility]


   Stop: 10/07/21 23:59


   Last Admin: 10/07/21 22:00 Dose:  250 mls/hr


   Documented by: 


Sodium Chloride (Normal Saline)  1,000 mls @ 100 mls/hr IV ASDIRECTED [facility]


Vancomycin HCl 1 gm/ Sodium (Chloride)  250 mls @ 250 mls/hr IV Q12H [facility]


Potassium Chloride 10 meq/ (Premix)  100 mls @ 100 mls/hr IV Q1H [facility]


   Stop: 10/08/21 12:59


   Last Admin: 10/08/21 15:37 Dose:  100 mls/hr


   Documented by: 


Iopamidol (Iopamidol 755 Mg/Ml 100 Ml Bottle)  100 ml IVPUSH ONETIME ONE


   Stop: 10/04/21 18:12


   Last Admin: 10/04/21 18:12 Dose:  100 ml


   Documented by: 


Methylprednisolone Sodium Succinate (Methylprednisolone Sodium Succinate 125 

Mg/2 Ml Sdv)  125 mg IVPUSH ONETIME ONE


   Stop: 10/05/21 08:21


   Last Admin: 10/05/21 08:25 Dose:  125 mg


   Documented by: 


Potassium Chloride (Potassium Chloride 10 Meq Tab.Er)  10 meq PO TID [facility]


   Last Admin: 10/07/21 22:00 Dose:  10 meq


   Documented by: 


Potassium Chloride (Potassium Chloride 20 Meq Tab.Er)  20 meq PO ONETIME ONE


   Stop: 10/06/21 07:21


   Last Admin: 10/06/21 08:37 Dose:  20 meq


   Documented by: 


Sodium Chloride (Sodium Chloride 0.9% 10 Ml Sdv)  10 ml FLUSH ONETIME ONE


   Stop: 10/04/21 18:12


   Last Admin: 10/04/21 20:31 Dose:  10 ml


   Documented by: 


Vancomycin HCl (Pharmacy To Dose - Vancomycin)  1 dose .XX ASDIRECTED [facility]


Warfarin Sodium (Warfarin 4 Mg Tab)  4 mg PO QPM [facility]


   Stop: 10/07/21 18:01


   Last Admin: 10/07/21 18:08 Dose:  4 mg


   Documented by: 


Warfarin Sodium (Warfarin 4 Mg Tab)  4 mg PO QPM [facility]


   Stop: 10/08/21 18:01


   Last Admin: 10/08/21 18:36 Dose:  4 mg


   Documented by: 


Warfarin Sodium (Warfarin 4 Mg Tab)  4 mg PO QPM [facility]


   Stop: 10/09/21 18:01


   Last Admin: 10/09/21 18:15 Dose:  4 mg


   Documented by: 











- Exam


Quality Assessment: Supplemental Oxygen (High flow)


General: Alert, Oriented


HEENT: Pupils Equal, Mucous Membr. Moist/Pink


Neck: Supple


Lungs: Crackles (Bibasilar).  No: Normal Respiratory Effort (Increased rate)


Cardiovascular: Regular Rate, Regular Rhythm


GI/Abdominal Exam: Normal Bowel Sounds, Soft, Non-Tender, No Distention


Extremities: Normal Inspection, No Pedal Edema, Normal Capillary Refill


Skin: Warm, Dry, Intact


Neurological: No New Focal Deficit





- Patient Data


Lab Results Last 24 hrs: 


                         Laboratory Results - last 24 hr











  10/09/21 10/10/21 10/10/21 Range/Units





  16:05 06:31 06:31 


 


WBC    12.48 H  (3.98-10.04)  K/mm3


 


RBC    4.23  (3.98-5.22)  M/mm3


 


Hgb    12.7  (11.2-15.7)  gm/dl


 


Hct    37.7  (34.1-44.9)  %


 


MCV    89.1  (79.4-94.8)  fl


 


MCH    30.0  (25.6-32.2)  pg


 


MCHC    33.7  (32.2-35.5)  g/dl


 


RDW Std Deviation    46.7 H  (36.4-46.3)  fL


 


Plt Count    446 H  (182-369)  K/mm3


 


MPV    9.8  (9.4-12.3)  fl


 


Neut % (Auto)    84.9 H  (34.0-71.1)  %


 


Lymph % (Auto)    8.9 L  (19.3-51.7)  %


 


Mono % (Auto)    5.1  (4.7-12.5)  %


 


Eos % (Auto)    0 L  (0.7-5.8)  


 


Baso % (Auto)    0.1  (0.1-1.2)  %


 


Neut # (Auto)    10.59 H  (1.56-6.13)  K/mm3


 


Lymph # (Auto)    1.11 L  (1.18-3.74)  K/mm3


 


Mono # (Auto)    0.64 H  (0.24-0.36)  K/mm3


 


Eos # (Auto)    0.00 L  (0.04-0.36)  K/mm3


 


Baso # (Auto)    0.01  (0.01-0.08)  K/mm3


 


PT   41.9 H D   (9.7-12.0)  SECONDS


 


INR   3.98   


 


Sodium     (136-145)  mEq/L


 


Potassium     (3.5-5.1)  mEq/L


 


Chloride     ()  mEq/L


 


Carbon Dioxide     (21-32)  mEq/L


 


Anion Gap     (5-15)  


 


BUN     (7-18)  mg/dL


 


Creatinine     (0.55-1.02)  mg/dL


 


Est Cr Clr Drug Dosing     mL/min


 


Estimated GFR (MDRD)     (>60)  mL/min


 


BUN/Creatinine Ratio     (14-18)  


 


Glucose     (70-99)  mg/dL


 


Calcium     (8.5-10.1)  mg/dL


 


Magnesium     (1.8-2.4)  mg/dL


 


Total Bilirubin     (0.2-1.0)  mg/dL


 


AST     (15-37)  U/L


 


ALT     (14-59)  U/L


 


Alkaline Phosphatase     ()  U/L


 


Troponin I  0.141 H*    (0.00-0.056)  ng/mL


 


Total Protein     (6.4-8.2)  g/dl


 


Albumin     (3.4-5.0)  g/dl


 


Globulin     gm/dL


 


Albumin/Globulin Ratio     (1-2)  














  10/10/21 Range/Units





  06:31 


 


WBC   (3.98-10.04)  K/mm3


 


RBC   (3.98-5.22)  M/mm3


 


Hgb   (11.2-15.7)  gm/dl


 


Hct   (34.1-44.9)  %


 


MCV   (79.4-94.8)  fl


 


MCH   (25.6-32.2)  pg


 


MCHC   (32.2-35.5)  g/dl


 


RDW Std Deviation   (36.4-46.3)  fL


 


Plt Count   (182-369)  K/mm3


 


MPV   (9.4-12.3)  fl


 


Neut % (Auto)   (34.0-71.1)  %


 


Lymph % (Auto)   (19.3-51.7)  %


 


Mono % (Auto)   (4.7-12.5)  %


 


Eos % (Auto)   (0.7-5.8)  


 


Baso % (Auto)   (0.1-1.2)  %


 


Neut # (Auto)   (1.56-6.13)  K/mm3


 


Lymph # (Auto)   (1.18-3.74)  K/mm3


 


Mono # (Auto)   (0.24-0.36)  K/mm3


 


Eos # (Auto)   (0.04-0.36)  K/mm3


 


Baso # (Auto)   (0.01-0.08)  K/mm3


 


PT   (9.7-12.0)  SECONDS


 


INR   


 


Sodium  135 L  (136-145)  mEq/L


 


Potassium  3.8  (3.5-5.1)  mEq/L


 


Chloride  101  ()  mEq/L


 


Carbon Dioxide  24  (21-32)  mEq/L


 


Anion Gap  13.8  (5-15)  


 


BUN  49 H  (7-18)  mg/dL


 


Creatinine  1.4 H  (0.55-1.02)  mg/dL


 


Est Cr Clr Drug Dosing  24.59  mL/min


 


Estimated GFR (MDRD)  36  (>60)  mL/min


 


BUN/Creatinine Ratio  35.0 H  (14-18)  


 


Glucose  153 H  (70-99)  mg/dL


 


Calcium  9.1  (8.5-10.1)  mg/dL


 


Magnesium  2.3  (1.8-2.4)  mg/dL


 


Total Bilirubin  0.3  (0.2-1.0)  mg/dL


 


AST  18  (15-37)  U/L


 


ALT  23  (14-59)  U/L


 


Alkaline Phosphatase  76  ()  U/L


 


Troponin I  0.056  (0.00-0.056)  ng/mL


 


Total Protein  6.9  (6.4-8.2)  g/dl


 


Albumin  2.1 L  (3.4-5.0)  g/dl


 


Globulin  4.8  gm/dL


 


Albumin/Globulin Ratio  0.4 L  (1-2)  











Result Diagrams: 


                                 10/10/21 06:31





                                 10/10/21 06:31





Sepsis Event Note





- Evaluation


Sepsis Screening Result: No Definite Risk





- Focused Exam


Vital Signs: 


                                   Vital Signs











  Temp Temp Pulse Resp BP BP Pulse Ox


 


 10/10/21 10:17       


 


 10/10/21 10:03       


 


 10/10/21 09:09  97.5 F   94  18  106/68   90 L


 


 10/10/21 09:04    94   106/68  


 


 10/10/21 06:20       


 


 10/10/21 04:00   98.1 F   22 H   112/71  84 L














  Pulse Ox


 


 10/10/21 10:17  93 L


 


 10/10/21 10:03  93 L


 


 10/10/21 09:09 


 


 10/10/21 09:04 


 


 10/10/21 06:20  92 L


 


 10/10/21 04:00 














- Problem List & Annotations


(1) Acute and chronic respiratory failure


SNOMED Code(s): 46474326


   Code(s): J96.20 - ACUTE AND CHR RESP FAILURE, UNSP W HYPOXIA OR HYPERCAPNIA  

Status: Acute   Priority: High   Current Visit: Yes   


Qualifiers: 


   Respiratory failure complication: hypoxia   Qualified Code(s): J96.21 - Acute

and chronic respiratory failure with hypoxia   





(2) Pulmonary fibrosis


SNOMED Code(s): 73037892


   Code(s): J84.10 - PULMONARY FIBROSIS, UNSPECIFIED   Status: Suspected   

Priority: High   Current Visit: Yes   





- Problem List Review


Problem List Initiated/Reviewed/Updated: Yes





- My Orders


Last 24 Hours: 


My Active Orders





10/09/21 10:24


Diltiazem [Cardizem]   10 mg IVPUSH Q1H PRN 





10/09/21 18:59


EKG 12 Lead [EK] Routine 





10/10/21 09:00


Aspirin [Halfprin]   81 mg PO DAILY 





10/10/21 10:00


Metoprolol Tartrate [Lopressor]   25 mg PO Q12H 














- Assessment


Assessment:: 


Admission assessment - 10/5/2021


* 81-year-old female who presents to ED on 10/4/2021 with low oxygen saturations


* History of Mnire's disease, GERD, and prior Covid pneumonia


* Was hospitalized from 9/13/2021 through 9/27/2021 with COVID-19 pneumonia at 

   in [place]. 


* Discharged home on 4 L of oxygen with activity after completing 4 days of 

  remdesivir and 10 days of Decadron. 


* Of note patient was noted to have episodes of bradycardia and hypotension with

   heart rate in the 30s and 40s. She was noticed to have episodes of Mobitz 

  type I AV block and 2.5-second sinus pauses. It was believed this was due to 

  remdesivir however this continued after stopping. Patient was to follow-up 

  with EP and have a 2-week cardiac monitor after discharge. 


* Prior to presenting to the ED she was noted to have saturations in the low 

  50s. 


* Patient's  increased her oxygen to 8 L via nasal cannula. 


* Denies any recent fever, chills, nausea, vomiting, diarrhea, urinary symptoms,

   or smoking history. 


* Of note patient did have acute cystitis with an E. coli UTI well in the 

  hospital in [place] and completed 5 days of Rocephin there. 


* 12-lead EKG was obtained showing a sinus tachycardia at 109 bpm with a LAFB 

  and very mild ST elevation in V2 and V3. 


* Placed on a nonrebreather mask which improved her saturations to 90 to 91%. 


* Noted to be dyspneic and only able to complete a few word sentences. 


* Labs are obtained:


   * WBC of 8.57. 


   * Hemoglobin 13.0. 


   * Hematocrit 30.5. 


   * Platelet 250,000. 


   * Neutrophils are elevated 75.7%. 


   * D-dimer is very high at 8.16. 


   * Sodium is low at 129. 


   * Potassium 3.8. 


   * Chloride 94. 


   * Carbon dioxide 24. 


   * Anion gap is 14.8. 


   * BUN is 15. Creatinine 0.6. GFR greater than 60. 


   * Glucose is 126. 


   * Calcium 8.7. 


   * Magnesium 1.8. 


   * Total bilirubin 0.6. 


   * AST is 36, ALT 31, alkaline phosphatase 86. 


   * Troponin is elevated at 0.127-->0.156-->0.067. 


   * CRP is very high at 29.3. 


   * Protein 7.1. 


   * Albumin 2.1. 


   * proBNP is 3117


   * UA is obtained and is mildly positive with slightly cloudy urine, 1+ 

     protein, 3+ ketones, 2+ occult blood, 1+ leukocyte esterase, 5-10 RBCs, 

     20-30 WBCs, rare WBC clumps, and moderate bacteria.


* ABG obtained in the left radial with a pH of 7.44. PCO2 of 30.9. PO2 of 58.0. 

  HCO3 of 20.5. O2 saturations 86%. Base excess is -2.3. Aa gradient is 15.0. 

  This is obtained while on nonrebreather at 15 L. 


* Facility was out of CPAP and BiPAP-> started on high flow O2 with saturations 

  in the low 90s. 


* CTA of the chest is obtained to rule out PE and shows:


   * 1. No findings of pulmonary embolism. 


   * 2. Minimal left-sided pleural effusion. 


   * 3. Diffuse emphysematous changes seen. Both lungs show diffuse increased 

     density uncertain how much of this represents diffuse fibrosis versus 

     possible superimposed pneumonia. Old comparison studies would be needed if 

     available. 


   * 4. 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 


   * 5. Other findings which are felt to be chronic as described above. 


* Chest x-ray shows scattered infiltrates bilaterally most prominent in the left

   lower lobe. 


* Plan was to transfer the patient for continued care due to elevated troponin 

  however no beds are noted to be available [place], or 

  [place]. 


* Noted to have a blood pressure of 80/61 and is given a 500 mill fluid bolus 

  over 2 hours. 


* Given 1 dose of Rocephin in the ED. She is also given 125 mg Solu-Medrol and a

   DuoNeb, which she reports greatly helped.


* Ultimately inpatient bed does open up at our facility and she is admitted to 

  the floor on telemetry for management of her hypoxia, suspected pneumonia, 

  elevated BNP, elevated troponin - likely demand ischemia, and questionable 

  UTI. 


* On floor bilateral lower extremity ultrasound shows thrombus within the left 

  mid and distal superficial, femoral, popliteal, posterior tibial, and peroneal

   veins.





10/6/2021


This is a 81 years old female post Covid infection who was hospitalized in 

[place] for approximately 2 weeks who presented to our ED with low saturations.

  On admission D-dimer was noted to be elevated however CTA was negative for any

 PE.  Lower extremity ultrasound did show a DVT in the left extremity and 

patient was started on 1 mg/kg Lovenox transitioning to 10 mg twice daily 

Eliquis today.  Patient has been requiring high flow 60 L and was weaned down to

 80% FiO2 today.  Unfortunately given patient's history of recent Covid 

infection CTA was not very helpful in differentiating pulmonary cause.  Given 

recent hospitalization there are concerns of a secondary bacterial infection.  

WBC has been WNL however neutrophils are elevated.  Procalcitonin was obtained 

and was 0.29.  Lactic acid yesterday was 1.0.  Patient was noted to be 

hypotensive in the emergency room and was given 2 fluid boluses.  She was also 

started on fluids over concerns of sepsis.  UA was positive and urine culture is

 growing out greater than 100,000 CFU's of gram-negative rods thus far.  She 

does have a history of a E. coli UTI treated with Rocephin in [place].  Patient

 denies any urinary symptoms.  proBNP was elevated.  Echocardiogram was obtained

 on 10/5/2021  And interpreted as: " 1.  The left ventricular internal cavity 

size is normal. 2.  Normal left ventricular systolic function. 3.  Left 

ventricular ejection fraction, by visual estimation, is 55 to 60%. 4.  No r

egional wall motion abnormalities. 5.  Mild proximal septal hypertrophy. 6.  

Aortic valve is tricuspid. 7.  Mild mitral valve regurgitation. 8.  Trace 

tricuspid valve regurgitation. 9.  Mild pulmonic valve regurgitation. 10.  The 

inferior vena cava is normal. 11.  The right ventricular systolic pressure is 

mildly elevated at 32.2 mmHg."  Patient was started on 20 mg IV push Lasix 3 

times daily yesterday and has had over 1 L output.  Potassium today is down to 

3.3 and this is being supplemented.  Sodium 137.  Carbon dioxide 24.  Anion gap 

is 13.3.  BUN is 19.  Creatinine 0.6.  GFR greater than 60.  Glucose is 146.  

Calcium 8.4.  Magnesium is 1.8.  Total bilirubin 0.5.  AST is 26, ALT 26, jon

line phosphatase 90.  Troponin today was down to 0.031.  CRP is down to 24.2.  

Albumin is down to 1.8.  Mycoplasma and strep pneumonia were both checked and 

were negative.  Respiratory viral panel was obtained and was negative however 

patient did return positive for Covid, which is unsurprising given the patient's

 recent known Covid infection.  We will continue current treatment plan.  It is 

felt it is prudent to continue IV antibiotics for now given patient's symptoms 

and lab results.  We will continue diuresis and Eliquis for DVT treatment.





10/7/2021


This is a 81-year-old female post Covid infection who was admitted for suspected

 pneumonia, pulmonary fibrosis, myocarditis, DVT, and apparent UTI.  Labs today 

show WBC of 8.94.  Hemoglobin is 11.1.  Neutrophils are elevated 77.7%.  Smear 

review feels slight toxic granulation and abnormal RBC morphology.  Sodium today

 is 140.  Potassium 3.7.  Chloride 103.  Carbon dioxide 20.  Anion gap is 12.7. 

 BUN is 25.  Creatinine 0.8.  GFR greater than 60.  Magnesium is 1.9.  Bilirubin

 0.5.  AST is 24, ALT 29, alkaline phosphatase is 90.  CRP is down to 12.5.  

Albumin is 1.8.  Patient does report that she feels quite a bit better today.  

She is down on high flow to 55 L with an FiO2 of 70% with saturations of upper 8

0s to low 90s.  Urine culture continues to show greater than 100,000 CFU's of E.

 coli.  Sensitivities are pending.  Blood cultures have returned negative.  

Strep pneumonia is negative.  Discussed plan of care with Dr. Newsome who 

recommends patient receive 1 more dose of Lasix and then either discontinue or 

continue with 20 mg daily.  We will continue vancomycin and Zosyn due to 

concerns over possible pneumonia.  Unfortunately today patient reported that her

 insurance company tells her Eliquis will be $600 for 30-day supply.  She states

 she cannot afford this.  Apparently Xarelto is around the same pricing per case

 management.  We will therefore discontinue Eliquis start patient on 1 mg/kg L

ovenox twice daily and start warfarin with pharmacy to dose.  Patient has had no

 fevers.  We will continue current treatment plan and hope to wean patient off 

of high flow.  Unknown length of stay due to severity of symptoms.





10/8/2021


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

 fibrosis, suspected myocarditis, DVT and UTI.  Labs today show WBC of 8.33.  

Hemoglobin is 11.7.  Neutrophils are 70.6.  INR is 1.20.  Sodium is 137.  Potass

ium 3.2.  Chloride 98.  Carbon dioxide 29.  Anion gap is 13.2.  BUN is 29.  

Creatinine 1.0.  GFR 53.  Glucose 101.  CRP is up to 16.2.  Albumin is 1.9.  

Discussed plan of care with Dr. Russ, attending hospitalist.  We will at this 

time discontinue vancomycin and Zosyn and switch patient to Bactrim DS twice 

daily based on E. coli urine sensitivities.  We will repeat a procalcitonin.  We

 will also decrease Lasix to 20 mg IV push daily.  Patient's potassium will be 

supplemented.  Pharmacy continues to dose warfarin and patient remains on 1 

mg/kg twice daily Lovenox bridging.  Will start patient on 60 mg every 8 hour IV

 push methylprednisolone.  Patient has been reporting difficulty with swallowing

 pills.  Because of this we will order an SLP evaluation.  We have made some 

improvement with her high flow she is now at 55 L with an FiO2 of 65%.  We will 

continue to wean as patient tolerates.  Unknown length of stay due to severity 

of symptoms.





10/9/2021


81-year-old female with post Covid admitted for suspected pneumonia, pulmonary 

fibrosis, suspected myocarditis, DVT, and UTI developed A. fib with RVR with 

rates in the 110s to 120s+ this morning.  This required increasing oxygen 

support with high flow nasal cannula at 60 L with FiO2 of 95% and nonrebreather 

mask.  Patient continues to be DNR/DNI.  She was given 1 dose of Cardizem 10 mg 

IV x1.  Blood pressure was stable with systolic in the 120s.  After rate control

 was achieved we were able to remove the nonrebreather mask.  CRP continues to 

be elevated at 12.7.  She was started on methylprednisolone yesterday and 

switched vancomycin and Zosyn to Bactrim for her UTI.  Procalcitonin did return 

at 0.18 reassuring us that she does not have a severe bacterial sepsis.  Patient

 is getting warfarin secondary to DVT with INR today at 1.85.  She continues on 

Lovenox until therapeutic INR.


EKG shows diffuse T wave inversion consistent with heart strain.  Troponin was 

drawn and increased to 0.14.  Also suggesting some component of heart strain 

versus infarction.  EKG also demonstrates new onset atrial fibrillation.  She is

 not a candidate for transfer at this time and is already on full dose Lovenox a

nd INR is 1.8 as we titrate up her warfarin.





10/10/2021


Patient has had a marginal improvement with high flow nasal cannula at 60 L and 

FiO2 of 90%.  She no longer has a nonrebreather mask over it.  Her INR is now 

supratherapeutic and her Lovenox will be held.  Pharmacy is dosing INR. 

Creatinine is 1.4 with estimated GFR of 36 and BUN of 49.  Lasix was stopped 

after the morning dose yesterday.  Today is day 6 of antibiotics for her UTI and

 she should be able to stop them tomorrow.  White count has increased to 12.5, 

but she has been placed on moderately high Solu-Medrol.  INR is also likely 

higher because of the Bactrim.  This will make getting her INR stabilized di

fficult.





- Plan


Plan:: 


Pneumonia - suspected


Dyspnea


Hypoxia


History of COVID-19


On home oxygen therapy


Acute on chronic respiratory failure 


Pulmonary fibrosis -on CT scan


* Droplet isolation (airborne/contact while on high flow O2)


* O2 as needed to keep saturation greater than 90%


* I-S/Acapella


* Discontinued zosyn and vancomycin on 10/8/2021


* High flow oxygen as directed


* 4 times daily scheduled DuoNebs


* Every 2 hours as needed albuterol nebulizer


* Consult RT


* Telemetry


* Blood cultures negative 


* Continuous pulse oximetry


* Mucinex BID


* Recommend PFT after recovery due to emphysematous changes noted in CTA


* Recommend pulmonology follow-up after discharge


* Repeat procalcitonin 0.18 on 10/8/2021


* Continue solu-medrol 60mg TID consider decreasing in the next day or 2


* Continue to follow CRP








DVT


Elevated d-dimer


* CTA in ED negative for PE


* Discontinue Eliquis as patient reports $600 for 30 day supply


* Warfarin with pharmacy to dose; INR for today


* DC Lovenox 60mg (1mg/kg) BID as bridging 





Myocarditis - suspected 


Elevated troponin 


Elevated brain natriuretic peptide (BNP) level


* Troponin increased returned to normal of 0.056 and she is back in sinus rhythm


* Start metoprolol


* DC Cardizem


* Echocardiogram obtained as above 


* Monitor patient's weight


* Strict I&O's


* Telemetry


* Continue to hold Lasix


* Supplement potassium as needed 


* Monitor CRP





UTI (urinary tract infection)


* UA weakly positivepatient was treated for E. coli UTI with Rocephin in 

  [place]


* Urine culture showing >100cfu E. Coli, with good sensitivities to Bactrim DS


* Blood cultures negative thus far 


* Switch from zosyn/vancomycin to Bactrim DS BID PO.  Tomorrow will be final day

   of Bactrim.





GERD (gastroesophageal reflux disease)


* No acute concerns


* Monitor 





Meniere disease


* No acute concerns


* Monitor





Hypokalemiaresolved


* Supplement as needed





Hypotension - resolved


* Noted in ED and multiple fluid boluses given


* Monitor 





Dysphagia


* Patient reports difficulty swallowing pills


* SLP evaluation





Code status: DNR/DNI


PCP: Dr. Monroe in [place]


DVT prophylaxis: Lovenox


Disposition: Patient admitted to the floor for management and further work-up of

 suspected pneumonia, hypoxia, elevated troponin, elevated D-dimer, and weak 

UTI.  





Length of stay greater than 96 hours due to need for continued treatment.





Prognosis poor.

## 2021-10-10 NOTE — PCM.EKG
** #1 Interpretation


EKG Date: 10/09/21


Time: 19:35


Rhythm: Other (Sinus tachycardia)


Rate (Beats/Min): 103


Axis: LAD-Left Axis Deviation (Left anterior hemiblock)


P-Wave: Present


QRS: Normal


ST-T: Other (Diffuse T wave inversion)


QT: Normal (Prolonged 592 ms)


Comparison: Change From Previous EKG (Return to sinus rhythm)


EKG Interpretation Comments: 





Abnormal EKG.  Resolution of atrial fibrillation with return to sinus 

tachycardia.  T wave inversion with poor R wave progression.  Likely heart 

strain.

## 2021-10-11 RX ADMIN — SODIUM CHLORIDE SCH MG: 0.9 INJECTION, SOLUTION INTRAVENOUS at 17:40

## 2021-10-11 RX ADMIN — ALBUTEROL SULFATE PRN MG: 2.5 SOLUTION RESPIRATORY (INHALATION) at 03:26

## 2021-10-11 RX ADMIN — SODIUM CHLORIDE SCH: 0.9 INJECTION, SOLUTION INTRAVENOUS at 18:17

## 2021-10-11 RX ADMIN — SODIUM CHLORIDE SCH MG: 0.9 INJECTION, SOLUTION INTRAVENOUS at 06:45

## 2021-10-11 NOTE — PCM.PN
- General Info


Date of Service: 10/11/21


Admission Dx/Problem (Free Text): 


                           Admission Diagnosis/Problem





Admission Diagnosis/Problem      Hypoxia








Functional Status: Reports: Pain Controlled, Tolerating Diet, Ambulating, 

Urinating, Incentive Spirometry.  Denies: New Symptoms





- Review of Systems


General: Reports: No Symptoms.  Denies: Fever, Weakness, Fatigue, Malaise, 

Chills


HEENT: Reports: No Symptoms.  Denies: Headaches, Sore Throat


Pulmonary: Reports: Shortness of Breath.  Denies: Cough, Sputum


Cardiovascular: Reports: Dyspnea on Exertion.  Denies: Chest Pain, Palpitations


Gastrointestinal: Reports: Diarrhea.  Denies: Abdominal Pain, Constipation, 

Nausea, Vomiting


Genitourinary: Reports: No Symptoms.  Denies: Pain


Musculoskeletal: Reports: No Symptoms


Skin: Reports: No Symptoms.  Denies: Cyanosis


Neurological: Reports: No Symptoms, Difficulty Walking, Weakness.  Denies: 

Confusion, Dizziness, Headache, Numbness, Pre-Existing Deficit, Seizure, 

Syncope, Tingling, Gait Disturbance


Psychiatric: Reports: No Symptoms





- Patient Data


Vitals - Most Recent: 


                                Last Vital Signs











Temp  97.2 F   10/11/21 07:46


 


Pulse  55 L  10/11/21 07:46


 


Resp  22 H  10/11/21 07:46


 


BP  128/71   10/11/21 07:46


 


Pulse Ox  86 L  10/11/21 07:46











Weight - Most Recent: 134 lb 11.2 oz


I&O - Last 24 Hours: 


                                 Intake & Output











 10/10/21 10/11/21 10/11/21





 22:59 06:59 14:59


 


Intake Total 1100 400 


 


Output Total 500 400 


 


Balance 600 0 











Lab Results Last 24 Hours: 


                         Laboratory Results - last 24 hr











  10/11/21 10/11/21 10/11/21 Range/Units





  05:06 05:06 05:06 


 


WBC   11.29 H   (3.98-10.04)  K/mm3


 


RBC   3.74 L   (3.98-5.22)  M/mm3


 


Hgb   11.3   (11.2-15.7)  gm/dl


 


Hct   33.9 L   (34.1-44.9)  %


 


MCV   90.6   (79.4-94.8)  fl


 


MCH   30.2   (25.6-32.2)  pg


 


MCHC   33.3   (32.2-35.5)  g/dl


 


RDW Std Deviation   47.8 H   (36.4-46.3)  fL


 


Plt Count   391 H   (182-369)  K/mm3


 


MPV   10.3   (9.4-12.3)  fl


 


Neut % (Auto)   87.0 H   (34.0-71.1)  %


 


Lymph % (Auto)   7.0 L   (19.3-51.7)  %


 


Mono % (Auto)   5.0   (4.7-12.5)  %


 


Eos % (Auto)   0 L   (0.7-5.8)  


 


Baso % (Auto)   0.1   (0.1-1.2)  %


 


Neut # (Auto)   9.82 H   (1.56-6.13)  K/mm3


 


Lymph # (Auto)   0.79 L   (1.18-3.74)  K/mm3


 


Mono # (Auto)   0.57 H   (0.24-0.36)  K/mm3


 


Eos # (Auto)   0.00 L   (0.04-0.36)  K/mm3


 


Baso # (Auto)   0.01   (0.01-0.08)  K/mm3


 


PT  44.5 H    (9.7-12.0)  SECONDS


 


INR  4.24    


 


Sodium    135 L  (136-145)  mEq/L


 


Potassium    4.4  (3.5-5.1)  mEq/L


 


Chloride    104  ()  mEq/L


 


Carbon Dioxide    25  (21-32)  mEq/L


 


Anion Gap    10.4  (5-15)  


 


BUN    55 H  (7-18)  mg/dL


 


Creatinine    1.1 H  (0.55-1.02)  mg/dL


 


Est Cr Clr Drug Dosing    31.29  mL/min


 


Estimated GFR (MDRD)    48  (>60)  mL/min


 


BUN/Creatinine Ratio    50.0 H  (14-18)  


 


Glucose    126 H  (70-99)  mg/dL


 


Calcium    8.7  (8.5-10.1)  mg/dL


 


Phosphorus    3.4  (2.6-4.7)  mg/dL


 


Magnesium    2.2  (1.8-2.4)  mg/dL


 


Total Bilirubin    0.2  (0.2-1.0)  mg/dL


 


AST    18  (15-37)  U/L


 


ALT    24  (14-59)  U/L


 


Alkaline Phosphatase    62  ()  U/L


 


C-Reactive Protein    3.2 H*  (<1.0)  mg/dL


 


Total Protein    5.9 L  (6.4-8.2)  g/dl


 


Albumin    1.9 L  (3.4-5.0)  g/dl


 


Globulin    4.0  gm/dL


 


Albumin/Globulin Ratio    0.5 L  (1-2)  











Med Orders - Current: 


                               Current Medications





Al Hydroxide/Mg Hydroxide (Aluminum Hydroxide/Magnesium Hydroxide/Simethicone S

usp 30 Ml Cup)  30 ml PO Q4H PRN


   PRN Reason: Heartburn


   Last Admin: 10/08/21 23:27 Dose:  30 ml


   Documented by: 


Albuterol (Albuterol 0.083% 2.5 Mg/3 Ml Neb Soln)  2.5 mg NEB Q2H PRN


   PRN Reason: Shortness Of Breath/wheezing


   Last Admin: 10/11/21 03:26 Dose:  2.5 mg


   Documented by: 


Albuterol/Ipratropium (Albuterol/Ipratropium 3.0-0.5 Mg/3 Ml Neb Soln)  3 ml NEB

QIDRT [facility]


   Last Admin: 10/11/21 06:02 Dose:  3 ml


   Documented by: 


Aspirin (Aspirin 81 Mg Tab.Ec)  81 mg PO DAILY [facility]


   Last Admin: 10/10/21 09:04 Dose:  81 mg


   Documented by: 


Diltiazem HCl (Diltiazem 50 Mg/10 Ml Sdv)  10 mg IVPUSH Q1H PRN


   PRN Reason: Tachycardia


   Last Admin: 10/09/21 10:36 Dose:  10 mg


   Documented by: 


Docusate Sodium (Docusate Sodium 100 Mg Cap)  100 mg PO Q12H PRN


   PRN Reason: Constipation


   Last Admin: 10/07/21 09:12 Dose:  100 mg


   Documented by: 


Guaifenesin (Guaifenesin 600 Mg Tab.Er)  600 mg PO BID [facility]


   Last Admin: 10/10/21 20:14 Dose:  600 mg


   Documented by: 


Sodium Chloride (Normal Saline)  250 mls @ 40 mls/hr IV ASDIRECTED [facility]


   Last Admin: 10/08/21 15:51 Dose:  40 mls/hr


   Documented by: 


Methylprednisolone Sodium Succinate (Methylprednisolone Sodium Succinate 40 Mg/1

Ml Sdv)  60 mg IVPUSH Q8H [facility]


   Last Admin: 10/11/21 06:45 Dose:  60 mg


   Documented by: 


Metoprolol Tartrate (Metoprolol Tartrate 25 Mg Tab)  25 mg PO Q12H [facility]


   Last Admin: 10/10/21 21:48 Dose:  25 mg


   Documented by: 


Ondansetron HCl (Ondansetron 4 Mg/2 Ml Sdv)  4 mg IV Q6H PRN


   PRN Reason: Nausea/Vomiting


Sodium Chloride (Sodium Chloride 0.9% 10 Ml Syringe)  10 ml FLUSH ASDIRECTED PRN


   PRN Reason: Keep Vein Open


   Last Admin: 10/04/21 18:12 Dose:  10 ml


   Documented by: 


Trimethoprim/Sulfamethoxazole (Sulfamethoxazole/Trimethoprim 800-160 Mg Tab)  1 

tab PO BID [facility]


   Last Admin: 10/10/21 20:14 Dose:  1 tab


   Documented by: 


Warfarin Sodium (Pharmacy To Dose - Warfarin)  1 dose .XX ASDIRECTED PRN


   PRN Reason: RX TOD DOSE WARFARIN


Warfarin Sodium (Warfarin 1 Mg Tab)  1 mg PO QPM [facility]


   Last Admin: 10/10/21 18:04 Dose:  1 mg


   Documented by: 





Discontinued Medications





Albuterol (Albuterol 0.083% 2.5 Mg/3 Ml Neb Soln)  2.5 mg NEB ONETIME ONE


   Stop: 10/04/21 15:19


   Last Admin: 10/04/21 15:47 Dose:  2.5 mg


   Documented by: 


Albuterol/Ipratropium (Albuterol/Ipratropium 3.0-0.5 Mg/3 Ml Neb Soln)  3 ml NEB

ONETIME ONE


   Stop: 10/05/21 08:21


   Last Admin: 10/05/21 08:37 Dose:  3 ml


   Documented by: 


Apixaban (Apixaban 5 Mg Tab)  10 mg PO BID [facility]


   Stop: 10/12/21 21:01


   Last Admin: 10/07/21 09:13 Dose:  10 mg


   Documented by: 


Diltiazem HCl (Diltiazem Ir 30 Mg Tab)  30 mg PO Q8HR [facility]


   Last Admin: 10/10/21 05:51 Dose:  30 mg


   Documented by: 


Enoxaparin Sodium (Enoxaparin 40 Mg/0.4 Ml Syringe)  40 mg SUBCUT DAILY [facility]


   Last Admin: 10/05/21 14:49 Dose:  40 mg


   Documented by: 


Enoxaparin Sodium (Enoxaparin 60 Mg/0.6 Ml Syringe)  20 mg SUBCUT ONETIME ONE


   Stop: 10/05/21 16:31


   Last Admin: 10/05/21 16:53 Dose:  20 mg


   Documented by: 


Enoxaparin Sodium (Enoxaparin 60 Mg/0.6 Ml Syringe)  60 mg SUBCUT Q12H [facility]


   Last Admin: 10/10/21 12:17 Dose:  Not Given


   Documented by: 


Furosemide (Furosemide 40 Mg/4 Ml Vial)  40 mg IVPUSH NOW ONE


   Stop: 10/05/21 20:04


   Last Admin: 10/05/21 21:33 Dose:  40 mg


   Documented by: 


Furosemide (Furosemide 20 Mg/2 Ml Vial)  20 mg IVPUSH TIDMEALS [facility]


   Last Admin: 10/08/21 06:34 Dose:  20 mg


   Documented by: 


Furosemide (Furosemide 20 Mg/2 Ml Vial)  20 mg IVPUSH DAILY [facility]


Furosemide (Furosemide 20 Mg/2 Ml Vial)  20 mg IVPUSH DAILY [facility]


   Last Admin: 10/09/21 09:05 Dose:  20 mg


   Documented by: 


Sodium Chloride (Normal Saline)  100 mls @ 60 mls/min IV ASDIRECTED [facility]


   Last Admin: 10/04/21 18:12 Dose:  60 mls/min


   Documented by: 


Sodium Chloride (Normal Saline)  1,000 mls @ 250 mls/hr IV ASDIRECTED [facility]


   Last Admin: 10/04/21 22:14 Dose:  250 mls/hr


   Documented by: 


Ceftriaxone Sodium 1 gm/ (Sodium Chloride)  100 mls @ 200 mls/hr IV ONETIME ONE


   Stop: 10/05/21 09:25


   Last Admin: 10/05/21 09:20 Dose:  200 mls/hr


   Documented by: 


Vancomycin HCl 1 gm/ Sodium (Chloride)  250 mls @ 250 mls/hr IV Q18H [facility]


   Last Admin: 10/05/21 14:26 Dose:  Not Given


   Documented by: 


Vancomycin HCl 1 gm/ Sodium (Chloride)  250 mls @ 250 mls/hr IV Q18H [facility]


   Last Admin: 10/05/21 15:37 Dose:  Not Given


   Documented by: 


Piperacillin Sod/Tazobactam (Sod 4.5 gm/ Sodium Chloride)  100 mls @ 200 mls/hr 

IV ONETIME ONE


   Stop: 10/05/21 14:59


   Last Admin: 10/05/21 14:19 Dose:  200 mls/hr


   Documented by: 


Piperacillin Sod/Tazobactam (Sod 4.5 gm/ Sodium Chloride)  100 mls @ 25 mls/hr 

IV Q8H [facility]


   Last Admin: 10/08/21 06:34 Dose:  25 mls/hr


   Documented by: 


Vancomycin HCl 1 gm/ Sodium (Chloride)  250 mls @ 250 mls/hr IV Q18H [facility]


   Stop: 10/07/21 23:59


   Last Admin: 10/07/21 22:00 Dose:  250 mls/hr


   Documented by: 


Sodium Chloride (Normal Saline)  1,000 mls @ 100 mls/hr IV ASDIRECTED [facility]


Vancomycin HCl 1 gm/ Sodium (Chloride)  250 mls @ 250 mls/hr IV Q12H [facility]


Potassium Chloride 10 meq/ (Premix)  100 mls @ 100 mls/hr IV Q1H [facility]


   Stop: 10/08/21 12:59


   Last Admin: 10/08/21 15:37 Dose:  100 mls/hr


   Documented by: 


Iopamidol (Iopamidol 755 Mg/Ml 100 Ml Bottle)  100 ml IVPUSH ONETIME ONE


   Stop: 10/04/21 18:12


   Last Admin: 10/04/21 18:12 Dose:  100 ml


   Documented by: 


Methylprednisolone Sodium Succinate (Methylprednisolone Sodium Succinate 125 

Mg/2 Ml Sdv)  125 mg IVPUSH ONETIME ONE


   Stop: 10/05/21 08:21


   Last Admin: 10/05/21 08:25 Dose:  125 mg


   Documented by: 


Potassium Chloride (Potassium Chloride 10 Meq Tab.Er)  10 meq PO TID [facility]


   Last Admin: 10/07/21 22:00 Dose:  10 meq


   Documented by: 


Potassium Chloride (Potassium Chloride 20 Meq Tab.Er)  20 meq PO ONETIME ONE


   Stop: 10/06/21 07:21


   Last Admin: 10/06/21 08:37 Dose:  20 meq


   Documented by: 


Sodium Chloride (Sodium Chloride 0.9% 10 Ml Sdv)  10 ml FLUSH ONETIME ONE


   Stop: 10/04/21 18:12


   Last Admin: 10/04/21 20:31 Dose:  10 ml


   Documented by: 


Vancomycin HCl (Pharmacy To Dose - Vancomycin)  1 dose .XX ASDIRECTED [facility]


Warfarin Sodium (Warfarin 4 Mg Tab)  4 mg PO QPM [facility]


   Stop: 10/07/21 18:01


   Last Admin: 10/07/21 18:08 Dose:  4 mg


   Documented by: 


Warfarin Sodium (Warfarin 4 Mg Tab)  4 mg PO QPM [facility]


   Stop: 10/08/21 18:01


   Last Admin: 10/08/21 18:36 Dose:  4 mg


   Documented by: 


Warfarin Sodium (Warfarin 4 Mg Tab)  4 mg PO QPM [facility]


   Stop: 10/09/21 18:01


   Last Admin: 10/09/21 18:15 Dose:  4 mg


   Documented by: 











- Exam


Quality Assessment: Supplemental Oxygen (55 L with FiO2 of 85%), DVT 

Prophylaxis.  No: Urine Catheter


General: Alert, Oriented, Cooperative, No Acute Distress


HEENT: Pupils Equal, Pupils Reactive, Mucous Membr. Moist/Pink


Neck: Supple, Trachea Midline


Lungs: Decreased Breath Sounds, Crackles (Bibasilar).  No: Normal Respiratory 

Effort (Tachypnea)


Cardiovascular: Regular Rate, Regular Rhythm


GI/Abdominal Exam: Normal Bowel Sounds, Soft, Non-Tender, No Distention


 (Female) Exam: Deferred


Back Exam: Normal Inspection, Full Range of Motion


Extremities: Normal Inspection, Normal Range of Motion, Non-Tender, No Pedal 

Edema


Peripheral Pulses: 2+: Radial (L), Radial (R), Dorsalis Pedis (L), Dorsalis 

Pedis (R)


Skin: Warm, Dry, Intact


Neurological: No New Focal Deficit


Psy/Mental Status: Alert, Normal Affect, Normal Mood





- Patient Data


Lab Results Last 24 hrs: 


                         Laboratory Results - last 24 hr











  10/11/21 10/11/21 10/11/21 Range/Units





  05:06 05:06 05:06 


 


WBC   11.29 H   (3.98-10.04)  K/mm3


 


RBC   3.74 L   (3.98-5.22)  M/mm3


 


Hgb   11.3   (11.2-15.7)  gm/dl


 


Hct   33.9 L   (34.1-44.9)  %


 


MCV   90.6   (79.4-94.8)  fl


 


MCH   30.2   (25.6-32.2)  pg


 


MCHC   33.3   (32.2-35.5)  g/dl


 


RDW Std Deviation   47.8 H   (36.4-46.3)  fL


 


Plt Count   391 H   (182-369)  K/mm3


 


MPV   10.3   (9.4-12.3)  fl


 


Neut % (Auto)   87.0 H   (34.0-71.1)  %


 


Lymph % (Auto)   7.0 L   (19.3-51.7)  %


 


Mono % (Auto)   5.0   (4.7-12.5)  %


 


Eos % (Auto)   0 L   (0.7-5.8)  


 


Baso % (Auto)   0.1   (0.1-1.2)  %


 


Neut # (Auto)   9.82 H   (1.56-6.13)  K/mm3


 


Lymph # (Auto)   0.79 L   (1.18-3.74)  K/mm3


 


Mono # (Auto)   0.57 H   (0.24-0.36)  K/mm3


 


Eos # (Auto)   0.00 L   (0.04-0.36)  K/mm3


 


Baso # (Auto)   0.01   (0.01-0.08)  K/mm3


 


PT  44.5 H    (9.7-12.0)  SECONDS


 


INR  4.24    


 


Sodium    135 L  (136-145)  mEq/L


 


Potassium    4.4  (3.5-5.1)  mEq/L


 


Chloride    104  ()  mEq/L


 


Carbon Dioxide    25  (21-32)  mEq/L


 


Anion Gap    10.4  (5-15)  


 


BUN    55 H  (7-18)  mg/dL


 


Creatinine    1.1 H  (0.55-1.02)  mg/dL


 


Est Cr Clr Drug Dosing    31.29  mL/min


 


Estimated GFR (MDRD)    48  (>60)  mL/min


 


BUN/Creatinine Ratio    50.0 H  (14-18)  


 


Glucose    126 H  (70-99)  mg/dL


 


Calcium    8.7  (8.5-10.1)  mg/dL


 


Phosphorus    3.4  (2.6-4.7)  mg/dL


 


Magnesium    2.2  (1.8-2.4)  mg/dL


 


Total Bilirubin    0.2  (0.2-1.0)  mg/dL


 


AST    18  (15-37)  U/L


 


ALT    24  (14-59)  U/L


 


Alkaline Phosphatase    62  ()  U/L


 


C-Reactive Protein    3.2 H*  (<1.0)  mg/dL


 


Total Protein    5.9 L  (6.4-8.2)  g/dl


 


Albumin    1.9 L  (3.4-5.0)  g/dl


 


Globulin    4.0  gm/dL


 


Albumin/Globulin Ratio    0.5 L  (1-2)  











Result Diagrams: 


                                 10/11/21 05:06





                                 10/11/21 05:06





Sepsis Event Note





- Evaluation


Sepsis Screening Result: No Definite Risk





- Focused Exam


Vital Signs: 


                                   Vital Signs











  Temp Pulse Resp BP Pulse Ox Pulse Ox


 


 10/11/21 07:46  97.2 F  55 L  22 H  128/71  86 L 


 


 10/11/21 06:02       90 L


 


 10/11/21 04:09   60   94/59 L  91 L 


 


 10/11/21 03:26       91 L


 


 10/10/21 21:48   67   107/79  


 


 10/10/21 21:14       91 L














- Problem List & Annotations


(1) History of COVID-19


SNOMED Code(s): 005326976348864350, 615674260532340646


   Code(s): Z86.16 - PERSONAL HISTORY OF COVID-19   Status: Chronic   Priority: 

Medium   Current Visit: Yes   





(2) Elevated d-dimer


SNOMED Code(s): 328391336


   Code(s): R79.89 - OTHER SPECIFIED ABNORMAL FINDINGS OF BLOOD CHEMISTRY   

Status: Acute   Priority: Medium   Current Visit: Yes   





(3) Elevated troponin


SNOMED Code(s): 869491939, 568934654, 251616680


   Code(s): R77.8 - OTHER SPECIFIED ABNORMALITIES OF PLASMA PROTEINS   Status: 

Resolved   Priority: High   Current Visit: Yes   





(4) Pneumonia


SNOMED Code(s): 270833859


   Code(s): J18.9 - PNEUMONIA, UNSPECIFIED ORGANISM   Status: Ruled-out   

Priority: High   Current Visit: Yes   


Qualifiers: 


   Pneumonia type: due to unspecified organism   Laterality: unspecified 

laterality   Lung location: unspecified part of lung   Qualified Code(s): J18.9 

- Pneumonia, unspecified organism   





(5) Dyspnea


SNOMED Code(s): 230608559


   Code(s): R06.00 - DYSPNEA, UNSPECIFIED   Status: Acute   Priority: High   

Current Visit: Yes   


Qualifiers: 


   Dyspnea type: unspecified   Qualified Code(s): R06.00 - Dyspnea, unspecified 

 





(6) Hypoxia


SNOMED Code(s): 222987082


   Code(s): R09.02 - HYPOXEMIA   Status: Acute   Priority: High   Current Visit:

Yes   





(7) UTI (urinary tract infection)


SNOMED Code(s): 03298693


   Code(s): N39.0 - URINARY TRACT INFECTION, SITE NOT SPECIFIED   Status: Acute 

 Priority: High   Current Visit: Yes   


Qualifiers: 


   Urinary tract infection type: site unspecified   Hematuria presence: without 

hematuria   Qualified Code(s): N39.0 - Urinary tract infection, site not 

specified   





(8) Elevated brain natriuretic peptide (BNP) level


SNOMED Code(s): 129552031, 903904732


   Code(s): R79.89 - OTHER SPECIFIED ABNORMAL FINDINGS OF BLOOD CHEMISTRY   

Status: Acute   Priority: High   Current Visit: Yes   





(9) On home oxygen therapy


SNOMED Code(s): 168625365101


   Code(s): Z99.81 - DEPENDENCE ON SUPPLEMENTAL OXYGEN   Status: Chronic   

Priority: Medium   Current Visit: Yes   





(10) GERD (gastroesophageal reflux disease)


SNOMED Code(s): 817227625


   Code(s): K21.9 - GASTRO-ESOPHAGEAL REFLUX DISEASE WITHOUT ESOPHAGITIS   

Status: Chronic   Priority: Low   Current Visit: No   


Qualifiers: 


   Esophagitis presence: esophagitis presence not specified   Qualified Code(s):

K21.9 - Gastro-esophageal reflux disease without esophagitis   





(11) Meniere disease


SNOMED Code(s): 91527886


   Code(s): H81.09 - MENIERE'S DISEASE, UNSPECIFIED EAR   Status: Chronic   

Priority: Low   Current Visit: No   


Qualifiers: 


   Laterality: unspecified laterality   Qualified Code(s): H81.09 - Meniere's 

disease, unspecified ear   





(12) DVT (deep venous thrombosis)


SNOMED Code(s): 817108196


   Code(s): I82.409 - ACUTE EMBOLISM AND THOMBOS UNSP DEEP VN UNSP LOWER 

EXTREMITY   Status: Acute   Priority: High   Current Visit: Yes   


Qualifiers: 


   DVT location: lower extremity   Affected thrombotic vein of extremity: 

unspecified vein of extremity   Chronicity: acute   Laterality: left   Qualified

Code(s): I82.402 - Acute embolism and thrombosis of unspecified deep veins of 

left lower extremity   





(13) Hypotension


SNOMED Code(s): 67369003


   Code(s): I95.9 - HYPOTENSION, UNSPECIFIED   Status: Resolved   Priority: High

  Current Visit: Yes   


Qualifiers: 


   Hypotension type: unspecified hypotension type   Qualified Code(s): I95.9 - 

Hypotension, unspecified   





(14) Acute and chronic respiratory failure


SNOMED Code(s): 72759486


   Code(s): J96.20 - ACUTE AND CHR RESP FAILURE, UNSP W HYPOXIA OR HYPERCAPNIA  

Status: Acute   Priority: High   Current Visit: Yes   


Qualifiers: 


   Respiratory failure complication: hypoxia   Qualified Code(s): J96.21 - Acute

and chronic respiratory failure with hypoxia   





(15) Myocarditis


SNOMED Code(s): 48265602


   Code(s): I51.4 - MYOCARDITIS, UNSPECIFIED   Status: Suspected   Priority: 

High   Current Visit: Yes   


Qualifiers: 


   Myocarditis type: infective   Infective myocarditis organism: viral   

Chronicity: acute   Qualified Code(s): I40.0 - Infective myocarditis   





(16) Hypokalemia


SNOMED Code(s): 16453204


   Code(s): E87.6 - HYPOKALEMIA   Status: Resolved   Priority: High   Current 

Visit: Yes   





(17) Pulmonary fibrosis


SNOMED Code(s): 90209551


   Code(s): J84.10 - PULMONARY FIBROSIS, UNSPECIFIED   Status: Suspected   

Priority: High   Current Visit: Yes   





(18) Diarrhea


SNOMED Code(s): 58975927


   Code(s): R19.7 - DIARRHEA, UNSPECIFIED   Status: Acute   Priority: Medium   

Current Visit: Yes   


Qualifiers: 


   Diarrhea type: unspecified type   Qualified Code(s): R19.7 - Diarrhea, 

unspecified   





(19) New onset a-fib


SNOMED Code(s): 61672138


   Code(s): I48.91 - UNSPECIFIED ATRIAL FIBRILLATION   Status: Acute   Priority:

High   Current Visit: Yes   





- Problem List Review


Problem List Initiated/Reviewed/Updated: Yes





- My Orders


Last 24 Hours: 


My Active Orders





10/10/21 18:00


Warfarin [Coumadin]   1 mg PO QPM 














- Assessment


Assessment:: 


Admission assessment - 10/5/2021


* 81-year-old female who presents to ED on 10/4/2021 with low oxygen saturations


* History of Mnire's disease, GERD, and prior Covid pneumonia


* Was hospitalized from 9/13/2021 through 9/27/2021 with COVID-19 pneumonia at 

  [facility] in [place]. 


* Discharged home on 4 L of oxygen with activity after completing 4 days of 

  remdesivir and 10 days of Decadron. 


* Of note patient was noted to have episodes of bradycardia and hypotension with

   heart rate in the 30s and 40s. She was noticed to have episodes of Mobitz 

  type I AV block and 2.5-second sinus pauses. It was believed this was due to 

  remdesivir however this continued after stopping. Patient was to follow-up 

  with EP and have a 2-week cardiac monitor after discharge. 


* Prior to presenting to the ED she was noted to have saturations in the low 

  50s. 


* Patient's  increased her oxygen to 8 L via nasal cannula. 


* Denies any recent fever, chills, nausea, vomiting, diarrhea, urinary symptoms,

   or smoking history. 


* Of note patient did have acute cystitis with an E. coli UTI well in the 

  hospital in [place] and completed 5 days of Rocephin there. 


* 12-lead EKG was obtained showing a sinus tachycardia at 109 bpm with a LAFB 

  and very mild ST elevation in V2 and V3. 


* Placed on a nonrebreather mask which improved her saturations to 90 to 91%. 


* Noted to be dyspneic and only able to complete a few word sentences. 


* Labs are obtained:


   * WBC of 8.57. 


   * Hemoglobin 13.0. 


   * Hematocrit 30.5. 


   * Platelet 250,000. 


   * Neutrophils are elevated 75.7%. 


   * D-dimer is very high at 8.16. 


   * Sodium is low at 129. 


   * Potassium 3.8. 


   * Chloride 94. 


   * Carbon dioxide 24. 


   * Anion gap is 14.8. 


   * BUN is 15. Creatinine 0.6. GFR greater than 60. 


   * Glucose is 126. 


   * Calcium 8.7. 


   * Magnesium 1.8. 


   * Total bilirubin 0.6. 


   * AST is 36, ALT 31, alkaline phosphatase 86. 


   * Troponin is elevated at 0.127-->0.156-->0.067. 


   * CRP is very high at 29.3. 


   * Protein 7.1. 


   * Albumin 2.1. 


   * proBNP is 3117


   * UA is obtained and is mildly positive with slightly cloudy urine, 1+ 

     protein, 3+ ketones, 2+ occult blood, 1+ leukocyte esterase, 5-10 RBCs, 20-

     30 WBCs, rare WBC clumps, and moderate bacteria.


* ABG obtained in the left radial with a pH of 7.44. PCO2 of 30.9. PO2 of 58.0. 

  HCO3 of 20.5. O2 saturations 86%. Base excess is -2.3. Aa gradient is 15.0. 

  This is obtained while on nonrebreather at 15 L. 


* Facility was out of CPAP and BiPAP-> started on high flow O2 with saturations 

  in the low 90s. 


* CTA of the chest is obtained to rule out PE and shows:


   * 1. No findings of pulmonary embolism. 


   * 2. Minimal left-sided pleural effusion. 


   * 3. Diffuse emphysematous changes seen. Both lungs show diffuse increased 

     density uncertain how much of this represents diffuse fibrosis versus 

     possible superimposed pneumonia. Old comparison studies would be needed if 

     available. 


   * 4. 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 


   * 5. Other findings which are felt to be chronic as described above. 


* Chest x-ray shows scattered infiltrates bilaterally most prominent in the left

   lower lobe. 


* Plan was to transfer the patient for continued care due to elevated troponin 

  however no beds are noted to be available [place] or 

  [place]. 


* Noted to have a blood pressure of 80/61 and is given a 500 mill fluid bolus 

  over 2 hours. 


* Given 1 dose of Rocephin in the ED. She is also given 125 mg Solu-Medrol and a

   DuoNeb, which she reports greatly helped.


* Ultimately inpatient bed does open up at our facility and she is admitted to 

  the floor on telemetry for management of her hypoxia, suspected pneumonia, 

  elevated BNP, elevated troponin - likely demand ischemia, and questionable UTI

  . 


* On floor bilateral lower extremity ultrasound shows thrombus within the left 

  mid and distal superficial, femoral, popliteal, posterior tibial, and peroneal

   veins.





10/6/2021


This is a 81 years old female post Covid infection who was hospitalized in 

[place] for approximately 2 weeks who presented to our ED with low saturations.

  On admission D-dimer was noted to be elevated however CTA was negative for any

 PE.  Lower extremity ultrasound did show a DVT in the left extremity and 

patient was started on 1 mg/kg Lovenox transitioning to 10 mg twice daily 

Eliquis today.  Patient has been requiring high flow 60 L and was weaned down to

 80% FiO2 today.  Unfortunately given patient's history of recent Covid 

infection CTA was not very helpful in differentiating pulmonary cause.  Given 

recent hospitalization there are concerns of a secondary bacterial infection.  

WBC has been WNL however neutrophils are elevated.  Procalcitonin was obtained 

and was 0.29.  Lactic acid yesterday was 1.0.  Patient was noted to be 

hypotensive in the emergency room and was given 2 fluid boluses.  She was also 

started on fluids over concerns of sepsis.  UA was positive and urine culture is

 growing out greater than 100,000 CFU's of gram-negative rods thus far.  She 

does have a history of a E. coli UTI treated with Rocephin in [place].  Patient

 denies any urinary symptoms.  proBNP was elevated.  Echocardiogram was obtained

 on 10/5/2021  And interpreted as: " 1.  The left ventricular internal cavity 

size is normal. 2.  Normal left ventricular systolic function. 3.  Left 

ventricular ejection fraction, by visual estimation, is 55 to 60%. 4.  No 

regional wall motion abnormalities. 5.  Mild proximal septal hypertrophy. 6.  

Aortic valve is tricuspid. 7.  Mild mitral valve regurgitation. 8.  Trace 

tricuspid valve regurgitation. 9.  Mild pulmonic valve regurgitation. 10.  The 

inferior vena cava is normal. 11.  The right ventricular systolic pressure is 

mildly elevated at 32.2 mmHg."  Patient was started on 20 mg IV push Lasix 3 

times daily yesterday and has had over 1 L output.  Potassium today is down to 

3.3 and this is being supplemented.  Sodium 137.  Carbon dioxide 24.  Anion gap 

is 13.3.  BUN is 19.  Creatinine 0.6.  GFR greater than 60.  Glucose is 146.  

Calcium 8.4.  Magnesium is 1.8.  Total bilirubin 0.5.  AST is 26, ALT 26, 

alkaline phosphatase 90.  Troponin today was down to 0.031.  CRP is down to 

24.2.  Albumin is down to 1.8.  Mycoplasma and strep pneumonia were both checked

 and were negative.  Respiratory viral panel was obtained and was negative 

however patient did return positive for Covid, which is unsurprising given the 

patient's recent known Covid infection.  We will continue current treatment 

plan.  It is felt it is prudent to continue IV antibiotics for now given 

patient's symptoms and lab results.  We will continue diuresis and Eliquis for 

DVT treatment.





10/7/2021


This is a 81-year-old female post Covid infection who was admitted for suspected

 pneumonia, pulmonary fibrosis, myocarditis, DVT, and apparent UTI.  Labs today 

show WBC of 8.94.  Hemoglobin is 11.1.  Neutrophils are elevated 77.7%.  Smear 

review feels slight toxic granulation and abnormal RBC morphology.  Sodium today

 is 140.  Potassium 3.7.  Chloride 103.  Carbon dioxide 20.  Anion gap is 12.7. 

 BUN is 25.  Creatinine 0.8.  GFR greater than 60.  Magnesium is 1.9.  Bilirubin

 0.5.  AST is 24, ALT 29, alkaline phosphatase is 90.  CRP is down to 12.5.  

Albumin is 1.8.  Patient does report that she feels quite a bit better today.  

She is down on high flow to 55 L with an FiO2 of 70% with saturations of upper 

80s to low 90s.  Urine culture continues to show greater than 100,000 CFU's of 

E. coli.  Sensitivities are pending.  Blood cultures have returned negative.  

Strep pneumonia is negative.  Discussed plan of care with Dr. Newsome who nick

mmends patient receive 1 more dose of Lasix and then either discontinue or 

continue with 20 mg daily.  We will continue vancomycin and Zosyn due to 

concerns over possible pneumonia.  Unfortunately today patient reported that her

 insurance company tells her Eliquis will be $600 for 30-day supply.  She states

 she cannot afford this.  Apparently Xarelto is around the same pricing per case

 management.  We will therefore discontinue Eliquis start patient on 1 mg/kg 

Lovenox twice daily and start warfarin with pharmacy to dose.  Patient has had 

no fevers.  We will continue current treatment plan and hope to wean patient off

 of high flow.  Unknown length of stay due to severity of symptoms.





10/8/2021


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

 fibrosis, suspected myocarditis, DVT and UTI.  Labs today show WBC of 8.33.  

Hemoglobin is 11.7.  Neutrophils are 70.6.  INR is 1.20.  Sodium is 137.  

Potassium 3.2.  Chloride 98.  Carbon dioxide 29.  Anion gap is 13.2.  BUN is 29.

  Creatinine 1.0.  GFR 53.  Glucose 101.  CRP is up to 16.2.  Albumin is 1.9.  

Discussed plan of care with Dr. Russ, attending hospitalist.  We will at this 

time discontinue vancomycin and Zosyn and switch patient to Bactrim DS twice 

daily based on E. coli urine sensitivities.  We will repeat a procalcitonin.  We

 will also decrease Lasix to 20 mg IV push daily.  Patient's potassium will be 

supplemented.  Pharmacy continues to dose warfarin and patient remains on 1 

mg/kg twice daily Lovenox bridging.  Will start patient on 60 mg every 8 hour IV

 push methylprednisolone.  Patient has been reporting difficulty with swallowing

 pills.  Because of this we will order an SLP evaluation.  We have made some 

improvement with her high flow she is now at 55 L with an FiO2 of 65%.  We will 

continue to wean as patient tolerates.  Unknown length of stay due to severity 

of symptoms.





10/9/2021


81-year-old female with post Covid admitted for suspected pneumonia, pulmonary 

fibrosis, suspected myocarditis, DVT, and UTI developed A. fib with RVR with 

rates in the 110s to 120s+ this morning.  This required increasing oxygen 

support with high flow nasal cannula at 60 L with FiO2 of 95% and nonrebreather 

mask.  Patient continues to be DNR/DNI.  She was given 1 dose of Cardizem 10 mg 

IV x1.  Blood pressure was stable with systolic in the 120s.  After rate control

 was achieved we were able to remove the nonrebreather mask.  CRP continues to 

be elevated at 12.7.  She was started on methylprednisolone yesterday and 

switched vancomycin and Zosyn to Bactrim for her UTI.  Procalcitonin did return 

at 0.18 reassuring us that she does not have a severe bacterial sepsis.  Patient

 is getting warfarin secondary to DVT with INR today at 1.85.  She continues on 

Lovenox until therapeutic INR.


EKG shows diffuse T wave inversion consistent with heart strain.  Troponin was 

drawn and increased to 0.14.  Also suggesting some component of heart strain 

versus infarction.  EKG also demonstrates new onset atrial fibrillation.  She is

 not a candidate for transfer at this time and is already on full dose Lovenox 

and INR is 1.8 as we titrate up her warfarin.





10/10/2021


Patient has had a marginal improvement with high flow nasal cannula at 60 L and 

FiO2 of 90%.  She no longer has a nonrebreather mask over it.  Her INR is now 

supratherapeutic and her Lovenox will be held.  Pharmacy is dosing INR. 

Creatinine is 1.4 with estimated GFR of 36 and BUN of 49.  Lasix was stopped 

after the morning dose yesterday.  Today is day 6 of antibiotics for her UTI and

 she should be able to stop them tomorrow.  White count has increased to 12.5, 

but she has been placed on moderately high Solu-Medrol.  INR is also likely 

higher because of the Bactrim.  This will make getting her INR stabilized 

difficult.





10/11/2021


This is a 81-year-old female post Covid who was admitted due to acute on chronic

 respiratory failure.  She has been requiring high flow and is currently on 55 L

 with an FiO2 of 85%.  She is initially treated for possible pneumonia and given

 labs and CT results it appears she has more of a pulmonary fibrosis picture.  

She was noted to have a UTI and was switched to Bactrim DS and she will complete

 treatment today.  Questioning myocarditis.  She also has a left leg DVT and was

 started on Eliquis, however patient has concerns with affording this and she 

was switched to warfarin.  INR today is 4.21.  She was started on steroids and 

will be decreased to 60 mg IV push Solu-Medrol twice daily today.  Given the 

Bactrim and Solu-Medrol her INR has been somewhat variable with difficult to 

dose warfarin.  Pharmacy continues to dose this.  WBC today is 11.29.  

Hemoglobin 11.3.  Platelet 391,000.  Neutrophils are 87%.  Sodium 135.  

Potassium 4.4.  Chloride 104.  Carbon dioxide 25.  Anion gap 10.4.  BUN is 55.  

Creatinine 1.1.  GFR is up to 48.  Glucose is 126.  Phosphorus 3.4.  Magnesium 

2.2.  Total bilirubin 0.2.  AST is 18, ALT 24, alkaline phosphatase 62.  CRP is 

down to 3.2.  Protein is 5.9.  Albumin is down to 1.9.  Patient was complaining 

of some diarrhea today and given all of her antibiotic she was on we checked a 

C. difficile which was negative.  We will therefore schedule Imodium.  Unknown 

length of stay due to severity of symptoms and concern over minimal improvement 

in oxygenation.  We will look at possible swing bed/LTAC facilities in the 

future.





- Plan


Plan:: 


Dyspnea


Hypoxia


History of COVID-19


On home oxygen therapy


Acute on chronic respiratory failure 


Pulmonary fibrosis -on CT scan


* Droplet isolation (airborne/contact while on high flow O2)


* O2 as needed to keep saturation greater than 90%


* I-S/Acapella


* Discontinued zosyn and vancomycin on 10/8/2021


* High flow oxygen as directed


* 4 times daily scheduled DuoNebs


* Every 2 hours as needed albuterol nebulizer


* Consult RT


* Telemetry


* Blood cultures negative 


* Continuous pulse oximetry


* Mucinex BID


* Recommend PFT after recovery due to emphysematous changes noted in CTA


* Recommend pulmonology follow-up after discharge


* Repeat procalcitonin 0.18 on 10/8/2021


* Discontinue solu-medrol 60mg BID


* Continue to follow CRP





DVT


Elevated d-dimer


* CTA in ED negative for PE


* Discontinue Eliquis as patient reports $600 for 30 day supply


* Warfarin with pharmacy to dose; INR for today


* DC Lovenox 60mg (1mg/kg) BID as bridging 





Myocarditis - suspected 


Elevated troponin, resolved 


Elevated brain natriuretic peptide (BNP) level


New onset A-fib


* Troponin increased returned to normal of 0.056 and she is back in sinus rhythm


* Metoprolol tartrate - 25mg BID


* Start 81mg daily aspirin


* DC Cardizem and switch to PRN


* Echocardiogram obtained as above 


* Monitor patient's weight


* Monitor I&O's


* Telemetry


* Continue to hold Lasix


* Supplement potassium as needed 


* Monitor CRP





UTI (urinary tract infection), Resolved 


* UA weakly positivepatient was treated for E. coli UTI with Rocephin in 

  [place]


* Urine culture showing >100cfu E. Coli, with good sensitivities to Bactrim DS


* Blood cultures negative thus far 


* Switch from zosyn/vancomycin to Bactrim DS BID PO --> Completed treatment on 

  10/11/2021





GERD (gastroesophageal reflux disease)


* No acute concerns


* Monitor 





Meniere disease


* No acute concerns


* Monitor





Hypokalemiaresolved


* Supplement as needed





Hypotension - resolved


* Noted in ED and multiple fluid boluses given


* Monitor 





Dysphagia


* Patient reports difficulty swallowing pills


* SLP evaluation -> crush pills





Diarrhea


* Check c. diff -->negative


* Start PRN Imodium





Code status: DNR/DNI


PCP: Dr. Monroe in [place]


DVT prophylaxis: Lovenox


Disposition: Patient admitted to the floor for management and further work-up of

 suspected pneumonia, hypoxia, elevated troponin, elevated D-dimer, and weak 

UTI.  





Length of stay greater than 96 hours due to need for continued treatment.





Unfortunately very poor overall prognosis

## 2021-10-12 RX ADMIN — SODIUM CHLORIDE SCH MG: 0.9 INJECTION, SOLUTION INTRAVENOUS at 06:41

## 2021-10-12 RX ADMIN — SODIUM CHLORIDE SCH MG: 0.9 INJECTION, SOLUTION INTRAVENOUS at 18:54

## 2021-10-12 NOTE — PCM.PN
- General Info


Date of Service: 10/12/21


Admission Dx/Problem (Free Text): 


                           Admission Diagnosis/Problem





Admission Diagnosis/Problem      Hypoxia








Functional Status: Reports: Pain Controlled, Tolerating Diet, Ambulating, 

Urinating, Incentive Spirometry.  Denies: New Symptoms





- Review of Systems


General: Reports: Weakness.  Denies: Fever, Fatigue, Malaise, Chills


HEENT: Reports: No Symptoms.  Denies: Headaches, Sore Throat


Pulmonary: Reports: Shortness of Breath.  Denies: Pleuritic Chest Pain, Cough, 

Sputum


Cardiovascular: Reports: Dyspnea on Exertion.  Denies: Chest Pain, Palpitations,

Lightheadedness


Gastrointestinal: Reports: Diarrhea (improving ).  Denies: Abdominal Pain, 

Constipation, Nausea, Vomiting


Genitourinary: Reports: No Symptoms


Musculoskeletal: Reports: No Symptoms


Skin: Reports: No Symptoms.  Denies: Cyanosis


Neurological: Reports: Difficulty Walking (Becomes short of breath), Weakness.  

Denies: Confusion, Headache, Numbness, Paresthesia, Pre-Existing Deficit, 

Seizure, Syncope, Tingling, Tremors, Trouble Speaking, Change in Speech, Gait 

Disturbance


Psychiatric: Reports: No Symptoms





- Patient Data


Vitals - Most Recent: 


                                Last Vital Signs











Temp  97.7 F   10/11/21 20:35


 


Pulse  77   10/11/21 21:23


 


Resp  26 H  10/11/21 20:35


 


BP  108/81   10/11/21 21:23


 


Pulse Ox  90 L  10/12/21 05:53











Weight - Most Recent: 132 lb 1.6 oz


I&O - Last 24 Hours: 


                                 Intake & Output











 10/11/21 10/12/21 10/12/21





 22:59 06:59 14:59


 


Intake Total 530 300 


 


Output Total 550 400 


 


Balance -20 -100 











Lab Results Last 24 Hours: 


                         Laboratory Results - last 24 hr











  10/11/21 Range/Units





  09:00 


 


C.difficile [id number]-[id number]  Presumptive negative  


 


C. difficile Tox (PCR)  Negative  











Robert Results Last 24 Hours: 


                                  Microbiology











 10/05/21 16:30 Blood Culture - Final





 Blood - Venous - Lab Draw 


 


 10/05/21 16:20 Blood Culture - Final





 Blood - Venous 











Med Orders - Current: 


                               Current Medications





Al Hydroxide/Mg Hydroxide (Aluminum Hydroxide/Magnesium Hydroxide/Simethicone 

Susp 30 Ml Cup)  30 ml PO Q4H PRN


   PRN Reason: Heartburn


   Last Admin: 10/08/21 23:27 Dose:  30 ml


   Documented by: 


Albuterol (Albuterol 0.083% 2.5 Mg/3 Ml Neb Soln)  2.5 mg NEB Q2H PRN


   PRN Reason: Shortness Of Breath/wheezing


   Last Admin: 10/11/21 03:26 Dose:  2.5 mg


   Documented by: 


Albuterol/Ipratropium (Albuterol/Ipratropium 3.0-0.5 Mg/3 Ml Neb Soln)  3 ml NEB

QIDRT [facility]


   Last Admin: 10/12/21 05:50 Dose:  3 ml


   Documented by: 


Aspirin (Aspirin 81 Mg Tab.Ec)  81 mg PO DAILY [facility]


   Last Admin: 10/11/21 08:44 Dose:  81 mg


   Documented by: 


Diltiazem HCl (Diltiazem 50 Mg/10 Ml Sdv)  10 mg IVPUSH Q1H PRN


   PRN Reason: Tachycardia


   Last Admin: 10/09/21 10:36 Dose:  10 mg


   Documented by: 


Docusate Sodium (Docusate Sodium 100 Mg Cap)  100 mg PO Q12H PRN


   PRN Reason: Constipation


   Last Admin: 10/07/21 09:12 Dose:  100 mg


   Documented by: 


Guaifenesin (Guaifenesin 600 Mg Tab.Er)  600 mg PO BID [facility]


   Last Admin: 10/11/21 20:28 Dose:  600 mg


   Documented by: 


Sodium Chloride (Normal Saline)  250 mls @ 40 mls/hr IV ASDIRECTED [facility]


   Last Admin: 10/08/21 15:51 Dose:  40 mls/hr


   Documented by: 


Loperamide HCl (Loperamide 2 Mg Cap)  2 mg PO Q4H PRN


   PRN Reason: Diarrhea


Methylprednisolone Sodium Succinate (Methylprednisolone Sodium Succinate 40 Mg/1

Ml Sdv)  60 mg IVPUSH Q12H [facility]


   Last Admin: 10/12/21 06:41 Dose:  60 mg


   Documented by: 


Metoprolol Tartrate (Metoprolol Tartrate 25 Mg Tab)  25 mg PO Q12H [facility]


   Last Admin: 10/11/21 21:23 Dose:  25 mg


   Documented by: 


Ondansetron HCl (Ondansetron 4 Mg/2 Ml Sdv)  4 mg IV Q6H PRN


   PRN Reason: Nausea/Vomiting


Sodium Chloride (Sodium Chloride 0.9% 10 Ml Syringe)  10 ml FLUSH ASDIRECTED PRN


   PRN Reason: Keep Vein Open


   Last Admin: 10/04/21 18:12 Dose:  10 ml


   Documented by: 


Warfarin Sodium (Pharmacy To Dose - Warfarin)  1 dose .XX ASDIRECTED PRN


   PRN Reason: RX TOD DOSE WARFARIN





Discontinued Medications





Albuterol (Albuterol 0.083% 2.5 Mg/3 Ml Neb Soln)  2.5 mg NEB ONETIME ONE


   Stop: 10/04/21 15:19


   Last Admin: 10/04/21 15:47 Dose:  2.5 mg


   Documented by: 


Albuterol/Ipratropium (Albuterol/Ipratropium 3.0-0.5 Mg/3 Ml Neb Soln)  3 ml NEB

ONETIME ONE


   Stop: 10/05/21 08:21


   Last Admin: 10/05/21 08:37 Dose:  3 ml


   Documented by: 


Apixaban (Apixaban 5 Mg Tab)  10 mg PO BID [facility]


   Stop: 10/12/21 21:01


   Last Admin: 10/07/21 09:13 Dose:  10 mg


   Documented by: 


Diltiazem HCl (Diltiazem Ir 30 Mg Tab)  30 mg PO Q8HR [facility]


   Last Admin: 10/10/21 05:51 Dose:  30 mg


   Documented by: 


Enoxaparin Sodium (Enoxaparin 40 Mg/0.4 Ml Syringe)  40 mg SUBCUT DAILY [facility]


   Last Admin: 10/05/21 14:49 Dose:  40 mg


   Documented by: 


Enoxaparin Sodium (Enoxaparin 60 Mg/0.6 Ml Syringe)  20 mg SUBCUT ONETIME ONE


   Stop: 10/05/21 16:31


   Last Admin: 10/05/21 16:53 Dose:  20 mg


   Documented by: 


Enoxaparin Sodium (Enoxaparin 60 Mg/0.6 Ml Syringe)  60 mg SUBCUT Q12H [facility]


   Last Admin: 10/10/21 12:17 Dose:  Not Given


   Documented by: 


Furosemide (Furosemide 40 Mg/4 Ml Vial)  40 mg IVPUSH NOW ONE


   Stop: 10/05/21 20:04


   Last Admin: 10/05/21 21:33 Dose:  40 mg


   Documented by: 


Furosemide (Furosemide 20 Mg/2 Ml Vial)  20 mg IVPUSH TIDMEALS [facility]


   Last Admin: 10/08/21 06:34 Dose:  20 mg


   Documented by: 


Furosemide (Furosemide 20 Mg/2 Ml Vial)  20 mg IVPUSH DAILY [facility]


Furosemide (Furosemide 20 Mg/2 Ml Vial)  20 mg IVPUSH DAILY [facility]


   Last Admin: 10/09/21 09:05 Dose:  20 mg


   Documented by: 


Sodium Chloride (Normal Saline)  100 mls @ 60 mls/min IV ASDIRECTED [facility]


   Last Admin: 10/04/21 18:12 Dose:  60 mls/min


   Documented by: 


Sodium Chloride (Normal Saline)  1,000 mls @ 250 mls/hr IV ASDIRECTED [facility]


   Last Admin: 10/04/21 22:14 Dose:  250 mls/hr


   Documented by: 


Ceftriaxone Sodium 1 gm/ (Sodium Chloride)  100 mls @ 200 mls/hr IV ONETIME ONE


   Stop: 10/05/21 09:25


   Last Admin: 10/05/21 09:20 Dose:  200 mls/hr


   Documented by: 


Vancomycin HCl 1 gm/ Sodium (Chloride)  250 mls @ 250 mls/hr IV Q18H [facility]


   Last Admin: 10/05/21 14:26 Dose:  Not Given


   Documented by: 


Vancomycin HCl 1 gm/ Sodium (Chloride)  250 mls @ 250 mls/hr IV Q18H [facility]


   Last Admin: 10/05/21 15:37 Dose:  Not Given


   Documented by: 


Piperacillin Sod/Tazobactam (Sod 4.5 gm/ Sodium Chloride)  100 mls @ 200 mls/hr 

IV ONETIME ONE


   Stop: 10/05/21 14:59


   Last Admin: 10/05/21 14:19 Dose:  200 mls/hr


   Documented by: 


Piperacillin Sod/Tazobactam (Sod 4.5 gm/ Sodium Chloride)  100 mls @ 25 mls/hr 

IV Q8H [facility]


   Last Admin: 10/08/21 06:34 Dose:  25 mls/hr


   Documented by: 


Vancomycin HCl 1 gm/ Sodium (Chloride)  250 mls @ 250 mls/hr IV Q18H [facility]


   Stop: 10/07/21 23:59


   Last Admin: 10/07/21 22:00 Dose:  250 mls/hr


   Documented by: 


Sodium Chloride (Normal Saline)  1,000 mls @ 100 mls/hr IV ASDIRECTED [facility]


Vancomycin HCl 1 gm/ Sodium (Chloride)  250 mls @ 250 mls/hr IV Q12H [facility]


Potassium Chloride 10 meq/ (Premix)  100 mls @ 100 mls/hr IV Q1H [facility]


   Stop: 10/08/21 12:59


   Last Admin: 10/08/21 15:37 Dose:  100 mls/hr


   Documented by: 


Iopamidol (Iopamidol 755 Mg/Ml 100 Ml Bottle)  100 ml IVPUSH ONETIME ONE


   Stop: 10/04/21 18:12


   Last Admin: 10/04/21 18:12 Dose:  100 ml


   Documented by: 


Loperamide HCl (Loperamide 2 Mg Cap)  4 mg PO ONETIME ONE


   Stop: 10/11/21 10:51


   Last Admin: 10/11/21 11:06 Dose:  4 mg


   Documented by: 


Methylprednisolone Sodium Succinate (Methylprednisolone Sodium Succinate 125 

Mg/2 Ml Sdv)  125 mg IVPUSH ONETIME ONE


   Stop: 10/05/21 08:21


   Last Admin: 10/05/21 08:25 Dose:  125 mg


   Documented by: 


Methylprednisolone Sodium Succinate (Methylprednisolone Sodium Succinate 40 Mg/1

Ml Sdv)  60 mg IVPUSH Q8H [facility]


   Last Admin: 10/11/21 06:45 Dose:  60 mg


   Documented by: 


Potassium Chloride (Potassium Chloride 10 Meq Tab.Er)  10 meq PO TID [facility]


   Last Admin: 10/07/21 22:00 Dose:  10 meq


   Documented by: 


Potassium Chloride (Potassium Chloride 20 Meq Tab.Er)  20 meq PO ONETIME ONE


   Stop: 10/06/21 07:21


   Last Admin: 10/06/21 08:37 Dose:  20 meq


   Documented by: 


Sodium Chloride (Sodium Chloride 0.9% 10 Ml Sdv)  10 ml FLUSH ONETIME ONE


   Stop: 10/04/21 18:12


   Last Admin: 10/04/21 20:31 Dose:  10 ml


   Documented by: 


Trimethoprim/Sulfamethoxazole (Sulfamethoxazole/Trimethoprim 800-160 Mg Tab)  1 

tab PO BID [facility]


   Last Admin: 10/11/21 08:44 Dose:  1 tab


   Documented by: 


Vancomycin HCl (Pharmacy To Dose - Vancomycin)  1 dose .XX ASDIRECTED [facility]


Warfarin Sodium (Warfarin 4 Mg Tab)  4 mg PO QPM [facility]


   Stop: 10/07/21 18:01


   Last Admin: 10/07/21 18:08 Dose:  4 mg


   Documented by: 


Warfarin Sodium (Warfarin 4 Mg Tab)  4 mg PO QPM [facility]


   Stop: 10/08/21 18:01


   Last Admin: 10/08/21 18:36 Dose:  4 mg


   Documented by: 


Warfarin Sodium (Warfarin 4 Mg Tab)  4 mg PO QPM [facility]


   Stop: 10/09/21 18:01


   Last Admin: 10/09/21 18:15 Dose:  4 mg


   Documented by: 


Warfarin Sodium (Warfarin 1 Mg Tab)  1 mg PO QPM [facility]


   Last Admin: 10/10/21 18:04 Dose:  1 mg


   Documented by: 


Warfarin Sodium (Warfarin Sliding Scale)  0 each PO QPM [facility]


   Stop: 10/11/21 18:01


   Last Admin: 10/11/21 17:46 Dose:  Not Given


   Documented by: 











- Exam


Quality Assessment: Supplemental Oxygen (High flow 55 L with 90% FiO2), DVT 

Prophylaxis.  No: Urine Catheter


General: Alert, Oriented, Cooperative, No Acute Distress


HEENT: Pupils Equal, Pupils Reactive, Mucous Membr. Moist/Pink


Neck: Supple, Trachea Midline


Lungs: Normal Respiratory Effort, Decreased Breath Sounds, Crackles


Cardiovascular: Regular Rate, Regular Rhythm


GI/Abdominal Exam: Normal Bowel Sounds, Soft, Non-Tender, No Distention


 (Female) Exam: Deferred


Back Exam: Normal Inspection, Full Range of Motion


Extremities: Normal Inspection, Normal Range of Motion, Non-Tender, No Pedal 

Edema, Normal Capillary Refill


Skin: Warm, Dry, Intact


Neurological: No New Focal Deficit


Psy/Mental Status: Alert, Normal Affect, Normal Mood





- Patient Data


Lab Results Last 24 hrs: 


                         Laboratory Results - last 24 hr











  10/11/21 Range/Units





  09:00 


 


C.difficile 027-NAP1-B1  Presumptive negative  


 


C. difficile Tox (PCR)  Negative  











Result Diagrams: 


                                 10/12/21 06:50





                                 10/12/21 06:50


Robert Results Last 24 hrs: 


                                  Microbiology











 10/05/21 16:30 Blood Culture - Final





 Blood - Venous - Lab Draw 


 


 10/05/21 16:20 Blood Culture - Final





 Blood - Venous 














Sepsis Event Note





- Evaluation


Sepsis Screening Result: No Definite Risk





- Focused Exam


Vital Signs: 


                                   Vital Signs











  Temp Pulse Resp BP Pulse Ox Pulse Ox


 


 10/12/21 05:53       90 L


 


 10/11/21 21:23   77   108/81  


 


 10/11/21 20:35  97.7 F  77  26 H  108/81  88 L 


 


 10/11/21 20:07       90 L














- Problem List & Annotations


(1) History of COVID-19


SNOMED Code(s): 756381654998768448, 499763840495825370


   Code(s): Z86.16 - PERSONAL HISTORY OF COVID-19   Status: Chronic   Priority: 

Medium   Current Visit: Yes   





(2) Elevated d-dimer


SNOMED Code(s): 189170075


   Code(s): R79.89 - OTHER SPECIFIED ABNORMAL FINDINGS OF BLOOD CHEMISTRY   

Status: Acute   Priority: Medium   Current Visit: Yes   





(3) Elevated troponin


SNOMED Code(s): 993111243, 412370626, 533722024


   Code(s): R77.8 - OTHER SPECIFIED ABNORMALITIES OF PLASMA PROTEINS   Status: 

Resolved   Priority: High   Current Visit: Yes   





(4) Pneumonia


SNOMED Code(s): 899977982


   Code(s): J18.9 - PNEUMONIA, UNSPECIFIED ORGANISM   Status: Ruled-out   

Priority: High   Current Visit: Yes   


Qualifiers: 


   Pneumonia type: due to unspecified organism   Laterality: unspecified 

laterality   Lung location: unspecified part of lung   Qualified Code(s): J18.9 

- Pneumonia, unspecified organism   





(5) Dyspnea


SNOMED Code(s): 381850394


   Code(s): R06.00 - DYSPNEA, UNSPECIFIED   Status: Acute   Priority: High   

Current Visit: Yes   


Qualifiers: 


   Dyspnea type: unspecified   Qualified Code(s): R06.00 - Dyspnea, unspecified 

 





(6) Hypoxia


SNOMED Code(s): 082735598


   Code(s): R09.02 - HYPOXEMIA   Status: Acute   Priority: High   Current Visit:

Yes   





(7) UTI (urinary tract infection)


SNOMED Code(s): 55591402


   Code(s): N39.0 - URINARY TRACT INFECTION, SITE NOT SPECIFIED   Status: Acute 

 Priority: High   Current Visit: Yes   


Qualifiers: 


   Urinary tract infection type: site unspecified   Hematuria presence: without 

hematuria   Qualified Code(s): N39.0 - Urinary tract infection, site not 

specified   





(8) Elevated brain natriuretic peptide (BNP) level


SNOMED Code(s): 300756092, 122192316


   Code(s): R79.89 - OTHER SPECIFIED ABNORMAL FINDINGS OF BLOOD CHEMISTRY   

Status: Acute   Priority: High   Current Visit: Yes   





(9) On home oxygen therapy


SNOMED Code(s): 865235641793


   Code(s): Z99.81 - DEPENDENCE ON SUPPLEMENTAL OXYGEN   Status: Chronic   

Priority: Medium   Current Visit: Yes   





(10) GERD (gastroesophageal reflux disease)


SNOMED Code(s): 153883792


   Code(s): K21.9 - GASTRO-ESOPHAGEAL REFLUX DISEASE WITHOUT ESOPHAGITIS   

Status: Chronic   Priority: Low   Current Visit: No   


Qualifiers: 


   Esophagitis presence: esophagitis presence not specified   Qualified Code(s):

K21.9 - Gastro-esophageal reflux disease without esophagitis   





(11) Meniere disease


SNOMED Code(s): 98287824


   Code(s): H81.09 - MENIERE'S DISEASE, UNSPECIFIED EAR   Status: Chronic   

Priority: Low   Current Visit: No   


Qualifiers: 


   Laterality: unspecified laterality   Qualified Code(s): H81.09 - Meniere's 

disease, unspecified ear   





(12) DVT (deep venous thrombosis)


SNOMED Code(s): 087574848


   Code(s): I82.409 - ACUTE EMBOLISM AND THOMBOS UNSP DEEP VN UNSP LOWER 

EXTREMITY   Status: Acute   Priority: High   Current Visit: Yes   


Qualifiers: 


   DVT location: lower extremity   Affected thrombotic vein of extremity: 

unspecified vein of extremity   Chronicity: acute   Laterality: left   Qualified

Code(s): I82.402 - Acute embolism and thrombosis of unspecified deep veins of 

left lower extremity   





(13) Hypotension


SNOMED Code(s): 63769523


   Code(s): I95.9 - HYPOTENSION, UNSPECIFIED   Status: Resolved   Priority: High

  Current Visit: Yes   


Qualifiers: 


   Hypotension type: unspecified hypotension type   Qualified Code(s): I95.9 - 

Hypotension, unspecified   





(14) Acute and chronic respiratory failure


SNOMED Code(s): 51757355


   Code(s): J96.20 - ACUTE AND CHR RESP FAILURE, UNSP W HYPOXIA OR HYPERCAPNIA  

Status: Acute   Priority: High   Current Visit: Yes   


Qualifiers: 


   Respiratory failure complication: hypoxia   Qualified Code(s): J96.21 - Acute

and chronic respiratory failure with hypoxia   





(15) Myocarditis


SNOMED Code(s): 89887771


   Code(s): I51.4 - MYOCARDITIS, UNSPECIFIED   Status: Suspected   Priority: 

High   Current Visit: Yes   


Qualifiers: 


   Myocarditis type: infective   Infective myocarditis organism: viral   

Chronicity: acute   Qualified Code(s): I40.0 - Infective myocarditis   





(16) Hypokalemia


SNOMED Code(s): 00478702


   Code(s): E87.6 - HYPOKALEMIA   Status: Resolved   Priority: High   Current 

Visit: Yes   





(17) Pulmonary fibrosis


SNOMED Code(s): 73066962


   Code(s): J84.10 - PULMONARY FIBROSIS, UNSPECIFIED   Status: Suspected   

Priority: High   Current Visit: Yes   





(18) Diarrhea


SNOMED Code(s): 00651775


   Code(s): R19.7 - DIARRHEA, UNSPECIFIED   Status: Acute   Priority: Medium   

Current Visit: Yes   


Qualifiers: 


   Diarrhea type: unspecified type   Qualified Code(s): R19.7 - Diarrhea, 

unspecified   





(19) New onset a-fib


SNOMED Code(s): 05212923


   Code(s): I48.91 - UNSPECIFIED ATRIAL FIBRILLATION   Status: Acute   Priority:

High   Current Visit: Yes   





- Problem List Review


Problem List Initiated/Reviewed/Updated: Yes





- My Orders


Last 24 Hours: 


My Active Orders





10/11/21 15:00


Loperamide [Imodium]   2 mg PO Q4H PRN 





10/11/21 19:00


methylPREDNISolone Sod Succ [Solu-MEDROL]   60 mg IVPUSH Q12H 





10/12/21 06:50


BASIC METABOLIC PANEL,BMP [CHEM] AM 


CBC WITH AUTO DIFF [HEME] AM 


CRP [C-REACTIVE PROTEIN] [CHEM] AM 


INR,PT,PROTHROMBIN TIME [COAG] AM 


MAGNESIUM [CHEM] AM 





10/13/21 05:11


BASIC METABOLIC PANEL,BMP [CHEM] AM 


CBC WITH AUTO DIFF [HEME] AM 


CRP [C-REACTIVE PROTEIN] [CHEM] AM 


INR,PT,PROTHROMBIN TIME [COAG] AM 


MAGNESIUM [CHEM] AM 





10/14/21 05:11


BASIC METABOLIC PANEL,BMP [CHEM] AM 


CBC WITH AUTO DIFF [HEME] AM 


CRP [C-REACTIVE PROTEIN] [CHEM] AM 


INR,PT,PROTHROMBIN TIME [COAG] AM 


MAGNESIUM [CHEM] AM 





10/15/21 05:11


BASIC METABOLIC PANEL,BMP [CHEM] AM 


CBC WITH AUTO DIFF [HEME] AM 


CRP [C-REACTIVE PROTEIN] [CHEM] AM 


INR,PT,PROTHROMBIN TIME [COAG] AM 


MAGNESIUM [CHEM] AM 














- Assessment


Assessment:: 


Admission assessment - 10/5/2021


* 81-year-old female who presents to ED on 10/4/2021 with low oxygen saturations


* History of Mnire's disease, GERD, and prior Covid pneumonia


* Was hospitalized from 9/13/2021 through 9/27/2021 with COVID-19 pneumonia at 

  [facility] in [place]. 


* Discharged home on 4 L of oxygen with activity after completing 4 days of 

  remdesivir and 10 days of Decadron. 


* Of note patient was noted to have episodes of bradycardia and hypotension with

   heart rate in the 30s and 40s. She was noticed to have episodes of Mobitz 

  type I AV block and 2.5-second sinus pauses. It was believed this was due to 

  remdesivir however this continued after stopping. Patient was to follow-up wit

  h EP and have a 2-week cardiac monitor after discharge. 


* Prior to presenting to the ED she was noted to have saturations in the low 

  50s. 


* Patient's  increased her oxygen to 8 L via nasal cannula. 


* Denies any recent fever, chills, nausea, vomiting, diarrhea, urinary symptoms,

   or smoking history. 


* Of note patient did have acute cystitis with an E. coli UTI well in the 

  hospital in [place] and completed 5 days of Rocephin there. 


* 12-lead EKG was obtained showing a sinus tachycardia at 109 bpm with a LAFB 

  and very mild ST elevation in V2 and V3. 


* Placed on a nonrebreather mask which improved her saturations to 90 to 91%. 


* Noted to be dyspneic and only able to complete a few word sentences. 


* Labs are obtained:


   * WBC of 8.57. 


   * Hemoglobin 13.0. 


   * Hematocrit 30.5. 


   * Platelet 250,000. 


   * Neutrophils are elevated 75.7%. 


   * D-dimer is very high at 8.16. 


   * Sodium is low at 129. 


   * Potassium 3.8. 


   * Chloride 94. 


   * Carbon dioxide 24. 


   * Anion gap is 14.8. 


   * BUN is 15. Creatinine 0.6. GFR greater than 60. 


   * Glucose is 126. 


   * Calcium 8.7. 


   * Magnesium 1.8. 


   * Total bilirubin 0.6. 


   * AST is 36, ALT 31, alkaline phosphatase 86. 


   * Troponin is elevated at 0.127-->0.156-->0.067. 


   * CRP is very high at 29.3. 


   * Protein 7.1. 


   * Albumin 2.1. 


   * proBNP is 3117


   * UA is obtained and is mildly positive with slightly cloudy urine, 1+ 

     protein, 3+ ketones, 2+ occult blood, 1+ leukocyte esterase, 5-10 RBCs, 20-

     30 WBCs, rare WBC clumps, and moderate bacteria.


* ABG obtained in the left radial with a pH of 7.44. PCO2 of 30.9. PO2 of 58.0. 

  HCO3 of 20.5. O2 saturations 86%. Base excess is -2.3. Aa gradient is 15.0. 

  This is obtained while on nonrebreather at 15 L. 


* Facility was out of CPAP and BiPAP-> started on high flow O2 with saturations 

  in the low 90s. 


* CTA of the chest is obtained to rule out PE and shows:


   * 1. No findings of pulmonary embolism. 


   * 2. Minimal left-sided pleural effusion. 


   * 3. Diffuse emphysematous changes seen. Both lungs show diffuse increased 

     density uncertain how much of this represents diffuse fibrosis versus 

     possible superimposed pneumonia. Old comparison studies would be needed if 

     available. 


   * 4. 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 


   * 5. Other findings which are felt to be chronic as described above. 


* Chest x-ray shows scattered infiltrates bilaterally most prominent in the left

   lower lobe. 


* Plan was to transfer the patient for continued care due to elevated troponin 

  however [place] beds are noted to be available [place] or 

  [place]. 


* Noted to have a blood pressure of 80/61 and is given a 500 mill fluid bolus 

  over 2 hours. 


* Given 1 dose of Rocephin in the ED. She is also given 125 mg Solu-Medrol and a

   DuoNeb, which she reports greatly helped.


* Ultimately inpatient bed does open up at our facility and she is admitted to 

  the floor on telemetry for management of her hypoxia, suspected pneumonia, 

  elevated BNP, elevated troponin - likely demand ischemia, and questionable 

  UTI. 


* On floor bilateral lower extremity ultrasound shows thrombus within the left 

  mid and distal superficial, femoral, popliteal, posterior tibial, and peroneal

   veins.





10/6/2021


This is a 81 years old female post Covid infection who was hospitalized in 

[place] for approximately 2 weeks who presented to our ED with low saturations.

  On admission D-dimer was noted to be elevated however CTA was negative for any

 PE.  Lower extremity ultrasound did show a DVT in the left extremity and 

patient was started on 1 mg/kg Lovenox transitioning to 10 mg twice daily 

Eliquis today.  Patient has been requiring high flow 60 L and was weaned down to

 80% FiO2 today.  Unfortunately given patient's history of recent Covid 

infection CTA was not very helpful in differentiating pulmonary cause.  Given 

recent hospitalization there are concerns of a secondary bacterial infection.  

WBC has been WNL however neutrophils are elevated.  Procalcitonin was obtained 

and was 0.29.  Lactic acid yesterday was 1.0.  Patient was noted to be 

hypotensive in the emergency room and was given 2 fluid boluses.  She was also 

started on fluids over concerns of sepsis.  UA was positive and urine culture is

 growing out greater than 100,000 CFU's of gram-negative rods thus far.  She 

does have a history of a E. coli UTI treated with Rocephin in [place].  Patient

 denies any urinary symptoms.  proBNP was elevated.  Echocardiogram was obtained

 on 10/5/2021  And interpreted as: " 1.  The left ventricular internal cavity 

size is normal. 2.  Normal left ventricular systolic function. 3.  Left 

ventricular ejection fraction, by visual estimation, is 55 to 60%. 4.  No 

regional wall motion abnormalities. 5.  Mild proximal septal hypertrophy. 6.  

Aortic valve is tricuspid. 7.  Mild mitral valve regurgitation. 8.  Trace 

tricuspid valve regurgitation. 9.  Mild pulmonic valve regurgitation. 10.  The 

inferior vena cava is normal. 11.  The right ventricular systolic pressure is 

mildly elevated at 32.2 mmHg."  Patient was started on 20 mg IV push Lasix 3 

times daily yesterday and has had over 1 L output.  Potassium today is down to 

3.3 and this is being supplemented.  Sodium 137.  Carbon dioxide 24.  Anion gap 

is 13.3.  BUN is 19.  Creatinine 0.6.  GFR greater than 60.  Glucose is 146.  

Calcium 8.4.  Magnesium is 1.8.  Total bilirubin 0.5.  AST is 26, ALT 26, 

alkaline phosphatase 90.  Troponin today was down to 0.031.  CRP is down to 

24.2.  Albumin is down to 1.8.  Mycoplasma and strep pneumonia were both checked

 and were negative.  Respiratory viral panel was obtained and was negative 

however patient did return positive for Covid, which is unsurprising given the 

patient's recent known Covid infection.  We will continue current treatment 

plan.  It is felt it is prudent to continue IV antibiotics for now given 

patient's symptoms and lab results.  We will continue diuresis and Eliquis for 

DVT treatment.





10/7/2021


This is a 81-year-old female post Covid infection who was admitted for suspected

 pneumonia, pulmonary fibrosis, myocarditis, DVT, and apparent UTI.  Labs today 

show WBC of 8.94.  Hemoglobin is 11.1.  Neutrophils are elevated 77.7%.  Smear 

review feels slight toxic granulation and abnormal RBC morphology.  Sodium today

 is 140.  Potassium 3.7.  Chloride 103.  Carbon dioxide 20.  Anion gap is 12.7. 

 BUN is 25.  Creatinine 0.8.  GFR greater than 60.  Magnesium is 1.9.  Bilirubin

 0.5.  AST is 24, ALT 29, alkaline phosphatase is 90.  CRP is down to 12.5.  

Albumin is 1.8.  Patient does report that she feels quite a bit better today.  

She is down on high flow to 55 L with an FiO2 of 70% with saturations of upper 

80s to low 90s.  Urine culture continues to show greater than 100,000 CFU's of 

E. coli.  Sensitivities are pending.  Blood cultures have returned negative.  

Strep pneumonia is negative.  Discussed plan of care with Dr. Newsome who 

recommends patient receive 1 more dose of Lasix and then either discontinue or 

continue with 20 mg daily.  We will continue vancomycin and Zosyn due to 

concerns over possible pneumonia.  Unfortunately today patient reported that her

 insurance company tells her Eliquis will be $600 for 30-day supply.  She states

 she cannot afford this.  Apparently Xarelto is around the same pricing per case

 management.  We will therefore discontinue Eliquis start patient on 1 mg/kg 

Lovenox twice daily and start warfarin with pharmacy to dose.  Patient has had 

no fevers.  We will continue current treatment plan and hope to wean patient off

 of high flow.  Unknown length of stay due to severity of symptoms.





10/8/2021


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

 fibrosis, suspected myocarditis, DVT and UTI.  Labs today show WBC of 8.33.  

Hemoglobin is 11.7.  Neutrophils are 70.6.  INR is 1.20.  Sodium is 137.  

Potassium 3.2.  Chloride 98.  Carbon dioxide 29.  Anion gap is 13.2.  BUN is 29.

  Creatinine 1.0.  GFR 53.  Glucose 101.  CRP is up to 16.2.  Albumin is 1.9.  

Discussed plan of care with Dr. Russ, attending hospitalist.  We will at this 

time discontinue vancomycin and Zosyn and switch patient to Bactrim DS twice 

daily based on E. coli urine sensitivities.  We will repeat a procalcitonin.  We

 will also decrease Lasix to 20 mg IV push daily.  Patient's potassium will be 

supplemented.  Pharmacy continues to dose warfarin and patient remains on 1 

mg/kg twice daily Lovenox bridging.  Will start patient on 60 mg every 8 hour IV

 push methylprednisolone.  Patient has been reporting difficulty with swallowing

 pills.  Because of this we will order an SLP evaluation.  We have made some 

improvement with her high flow she is now at 55 L with an FiO2 of 65%.  We will 

continue to wean as patient tolerates.  Unknown length of stay due to severity 

of symptoms.





10/9/2021


81-year-old female with post Covid admitted for suspected pneumonia, pulmonary 

fibrosis, suspected myocarditis, DVT, and UTI developed A. fib with RVR with 

rates in the 110s to 120s+ this morning.  This required increasing oxygen 

support with high flow nasal cannula at 60 L with FiO2 of 95% and nonrebreather 

mask.  Patient continues to be DNR/DNI.  She was given 1 dose of Cardizem 10 mg 

IV x1.  Blood pressure was stable with systolic in the 120s.  After rate control

 was achieved we were able to remove the nonrebreather mask.  CRP continues to 

be elevated at 12.7.  She was started on methylprednisolone yesterday and 

switched vancomycin and Zosyn to Bactrim for her UTI.  Procalcitonin did return 

at 0.18 reassuring us that she does not have a severe bacterial sepsis.  Patient

 is getting warfarin secondary to DVT with INR today at 1.85.  She continues on 

Lovenox until therapeutic INR.


EKG shows diffuse T wave inversion consistent with heart strain.  Troponin was 

drawn and increased to 0.14.  Also suggesting some component of heart strain 

versus infarction.  EKG also demonstrates new onset atrial fibrillation.  She is

 not a candidate for transfer at this time and is already on full dose Lovenox 

and INR is 1.8 as we titrate up her warfarin.





10/10/2021


Patient has had a marginal improvement with high flow nasal cannula at 60 L and 

FiO2 of 90%.  She no longer has a nonrebreather mask over it.  Her INR is now 

supratherapeutic and her Lovenox will be held.  Pharmacy is dosing INR. 

Creatinine is 1.4 with estimated GFR of 36 and BUN of 49.  Lasix was stopped 

after the morning dose yesterday.  Today is day 6 of antibiotics for her UTI and

 she should be able to stop them tomorrow.  White count has increased to 12.5, 

but she has been placed on moderately high Solu-Medrol.  INR is also likely 

higher because of the Bactrim.  This will make getting her INR stabilized 

difficult.





10/11/2021


This is a 81-year-old female post Covid who was admitted due to acute on chronic

 respiratory failure.  She has been requiring high flow and is currently on 55 L

 with an FiO2 of 85%.  She is initially treated for possible pneumonia and given

 labs and CT results it appears she has more of a pulmonary fibrosis picture.  

She was noted to have a UTI and was switched to Bactrim DS and she will complete

 treatment today.  Questioning myocarditis.  She also has a left leg DVT and was

 started on Eliquis, however patient has concerns with affording this and she 

was switched to warfarin.  INR today is 4.21.  She was started on steroids and 

will be decreased to 60 mg IV push Solu-Medrol twice daily today.  Given the 

Bactrim and Solu-Medrol her INR has been somewhat variable with difficult to 

dose warfarin.  Pharmacy continues to dose this.  WBC today is 11.29.  

Hemoglobin 11.3.  Platelet 391,000.  Neutrophils are 87%.  Sodium 135.  

Potassium 4.4.  Chloride 104.  Carbon dioxide 25.  Anion gap 10.4.  BUN is 55.  

Creatinine 1.1.  GFR is up to 48.  Glucose is 126.  Phosphorus 3.4.  Magnesium 

2.2.  Total bilirubin 0.2.  AST is 18, ALT 24, alkaline phosphatase 62.  CRP is 

down to 3.2.  Protein is 5.9.  Albumin is down to 1.9.  Patient was complaining 

of some diarrhea today and given all of her antibiotic she was on we checked a 

C. difficile which was negative.  We will therefore schedule Imodium.  Unknown 

length of stay due to severity of symptoms and concern over minimal improvement 

in oxygenation.  We will look at possible swing bed/LTAC facilities in the 

future.





10/12/2021


This is a 81-year-old female post COVID-19 pneumonia who was admitted to the 

floor due to acute respiratory failure now believed to be secondary to pulmonary

 fibrosis.  She was also noted to have a left leg DVT and was started on Eliquis

 ultimately is being switched to warfarin with Lovenox bridging.  UA and culture

 were suggestive of E. coli UTI and she completed treatment for this.  Remains 

on high flow 50 L with an FiO2 of 90% and we have not been able to make much 

movement with this.  She continues to have dyspnea with exertion.  She has mild 

diarrhea which is improving.  C. difficile screen was negative.  Labs today show

 WBC which is elevated at 12.79, likely secondary to steroid use.  Platelet is 

423.  INR on warfarin is therapeutic at 2.50.  Sodium is 136.  Potassium 4.9.  

Chloride 104.  Carbon oxide 25.  Anion gap 11.9.  BUN is 55.  Creatinine 1.0.  

GFR is up to 53.  Calcium is 8.9.  Magnesium is 2.2.  CRP is down to 1.8.  

Overall she remains stable.   was in room today and all questions were 

answered.  We discussed the possibility of a transfer to an LTAC such as [facility] 

and [facility] and they are very interested in this.  We discussed how she may 

never recover from this and if she does it will be very slow progress.  She is 

currently on 60 mg twice daily Solu-Medrol and we will continue to wean this.  

Plan will be to discharge to LTAC once available.





- Plan


Plan:: 


Dyspnea


Hypoxia


History of COVID-19


On home oxygen therapy


Acute on chronic respiratory failure 


Pulmonary fibrosis -on CT scan


* Droplet isolation (airborne/contact while on high flow O2)


* O2 as needed to keep saturation greater than 90%


* I-S/Acapella


* Discontinued zosyn and vancomycin on 10/8/2021


* High flow oxygen as directed


* 4 times daily scheduled DuoNebs


* Every 2 hours as needed albuterol nebulizer


* Consult RT


* Telemetry


* Blood cultures negative 


* Continuous pulse oximetry


* Mucinex BID


* Recommend PFT after recovery due to emphysematous changes noted in CTA


* Recommend pulmonology follow-up after discharge


* Repeat procalcitonin 0.18 on 10/8/2021


* Continue solu-medrol 60mg BID


* Continue to follow CRP





DVT


Elevated d-dimer


* CTA in ED negative for PE


* Discontinue Eliquis as patient reports $600 for 30 day supply


* Warfarin with pharmacy to dose; INR for today therapeutic 


* DC Lovenox 60mg (1mg/kg) BID as bridging 





Myocarditis - suspected 


Elevated troponin, resolved 


Elevated brain natriuretic peptide (BNP) level


New onset A-fib


* Troponin increased returned to normal of 0.056 and she is back in sinus rhythm


* Metoprolol tartrate - 25mg BID


* Continue 81mg daily aspirin


* DC Cardizem and switch to PRN


* Echocardiogram obtained as noted 


* Monitor patient's weight


* Monitor I&O's


* Telemetry


* Continue to hold Lasix


* Supplement potassium as needed 


* Monitor CRP





UTI (urinary tract infection), Resolved 


* UA weakly positivepatient was treated for E. coli UTI with Rocephin in 

  [place]


* Urine culture showing >100cfu E. Coli, with good sensitivities to Bactrim DS


* Blood cultures negative thus far 


* Switch from zosyn/vancomycin to Bactrim DS BID PO --> Completed treatment on 

  10/11/2021





GERD (gastroesophageal reflux disease)


* No acute concerns


* Monitor 





Meniere disease


* No acute concerns


* Monitor





Hypokalemiaresolved


* Supplement as needed





Hypotension - resolved


* Noted in ED and multiple fluid boluses given


* Monitor 





Dysphagia


* Patient reports difficulty swallowing pills


* SLP evaluation -> crush pills





Diarrhea, improved 


* Check c. diff -->negative


* ContinuePRN Imodium





Code status: DNR/DNI


PCP: Dr. Monroe in [place]


DVT prophylaxis: Warfarin


Disposition: Patient admitted to the floor for management and further work-up of

 suspected pneumonia, hypoxia, elevated troponin, elevated D-dimer, and weak 

UTI.  





Length of stay greater than 96 hours due to need for continued treatment.





Unfortunately very poor overall prognosis

## 2021-10-13 RX ADMIN — SODIUM CHLORIDE SCH MG: 0.9 INJECTION, SOLUTION INTRAVENOUS at 06:34

## 2021-10-13 RX ADMIN — SODIUM CHLORIDE SCH: 0.9 INJECTION, SOLUTION INTRAVENOUS at 09:06

## 2021-10-13 NOTE — PCM.PN
- General Info


Date of Service: 10/13/21


Admission Dx/Problem (Free Text): 


                           Admission Diagnosis/Problem





Admission Diagnosis/Problem      Hypoxia








Functional Status: Reports: Pain Controlled, Tolerating Diet, Ambulating, 

Urinating, Incentive Spirometry.  Denies: New Symptoms





- Review of Systems


General: Reports: Weakness.  Denies: Fever, Fatigue, Malaise, Chills


HEENT: Reports: No Symptoms.  Denies: Headaches, Sore Throat


Pulmonary: Reports: Shortness of Breath, Cough.  Denies: Sputum


Cardiovascular: Reports: Dyspnea on Exertion.  Denies: Chest Pain, Palpitations,

Edema


Gastrointestinal: Reports: Diarrhea (mild ).  Denies: Abdominal Pain, 

Constipation, Nausea, Vomiting


Genitourinary: Reports: No Symptoms.  Denies: Pain


Musculoskeletal: Reports: No Symptoms


Skin: Reports: No Symptoms.  Denies: Cyanosis


Neurological: Reports: Difficulty Walking (2/2 oxygen demand).  Denies: 

Confusion, Dizziness, Headache, Numbness, Pre-Existing Deficit, Seizure, 

Syncope, Tingling, Trouble Speaking


Psychiatric: Reports: No Symptoms





- Patient Data


Vitals - Most Recent: 


                                Last Vital Signs











Temp  98.1 F   10/13/21 05:31


 


Pulse  59 L  10/13/21 05:31


 


Resp  25 H  10/13/21 05:31


 


BP  125/108 H  10/13/21 05:31


 


Pulse Ox  91 L  10/13/21 06:15











Weight - Most Recent: 131 lb 11.2 oz


I&O - Last 24 Hours: 


                                 Intake & Output











 10/12/21 10/13/21 10/13/21





 22:59 06:59 14:59


 


Intake Total 380 800 


 


Output Total 600 750 


 


Balance -220 50 











Lab Results Last 24 Hours: 


                         Laboratory Results - last 24 hr











  10/13/21 10/13/21 10/13/21 Range/Units





  05:20 05:20 05:20 


 


WBC  12.41 H    (3.98-10.04)  K/mm3


 


RBC  3.95 L    (3.98-5.22)  M/mm3


 


Hgb  11.7    (11.2-15.7)  gm/dl


 


Hct  36.0    (34.1-44.9)  %


 


MCV  91.1    (79.4-94.8)  fl


 


MCH  29.6    (25.6-32.2)  pg


 


MCHC  32.5    (32.2-35.5)  g/dl


 


RDW Std Deviation  47.6 H    (36.4-46.3)  fL


 


Plt Count  393 H    (182-369)  K/mm3


 


MPV  10.3    (9.4-12.3)  fl


 


Neut % (Auto)  88.9 H    (34.0-71.1)  %


 


Lymph % (Auto)  5.0 L    (19.3-51.7)  %


 


Mono % (Auto)  4.7    (4.7-12.5)  %


 


Eos % (Auto)  0 L    (0.7-5.8)  


 


Baso % (Auto)  0.1    (0.1-1.2)  %


 


Neut # (Auto)  11.04 H    (1.56-6.13)  K/mm3


 


Lymph # (Auto)  0.62 L    (1.18-3.74)  K/mm3


 


Mono # (Auto)  0.58 H    (0.24-0.36)  K/mm3


 


Eos # (Auto)  0.00 L    (0.04-0.36)  K/mm3


 


Baso # (Auto)  0.01    (0.01-0.08)  K/mm3


 


PT   21.2 H   (9.7-12.0)  SECONDS


 


INR   1.96   


 


Sodium    136  (136-145)  mEq/L


 


Potassium    5.0  (3.5-5.1)  mEq/L


 


Chloride    106  ()  mEq/L


 


Carbon Dioxide    22  (21-32)  mEq/L


 


Anion Gap    13.0  (5-15)  


 


BUN    51 H  (7-18)  mg/dL


 


Creatinine    0.8  (0.55-1.02)  mg/dL


 


Est Cr Clr Drug Dosing    43.03  mL/min


 


Estimated GFR (MDRD)    > 60  (>60)  mL/min


 


BUN/Creatinine Ratio    63.8 H  (14-18)  


 


Glucose    111 H  (70-99)  mg/dL


 


Calcium    8.8  (8.5-10.1)  mg/dL


 


Magnesium    2.0  (1.8-2.4)  mg/dL


 


C-Reactive Protein    0.6  (<1.0)  mg/dL











Med Orders - Current: 


                               Current Medications





Al Hydroxide/Mg Hydroxide (Aluminum Hydroxide/Magnesium Hydroxide/Simethicone 

Susp 30 Ml Cup)  30 ml PO Q4H PRN


   PRN Reason: Heartburn


   Last Admin: 10/08/21 23:27 Dose:  30 ml


   Documented by: 


Albuterol (Albuterol 0.083% 2.5 Mg/3 Ml Neb Soln)  2.5 mg NEB Q2H PRN


   PRN Reason: Shortness Of Breath/wheezing


   Last Admin: 10/11/21 03:26 Dose:  2.5 mg


   Documented by: 


Albuterol/Ipratropium (Albuterol/Ipratropium 3.0-0.5 Mg/3 Ml Neb Soln)  3 ml NEB

QIDRT [facility]


   Last Admin: 10/13/21 06:15 Dose:  3 ml


   Documented by: 


Aspirin (Aspirin 81 Mg Tab.Ec)  81 mg PO DAILY [facility]


   Last Admin: 10/12/21 09:19 Dose:  81 mg


   Documented by: 


Diltiazem HCl (Diltiazem 50 Mg/10 Ml Sdv)  10 mg IVPUSH Q1H PRN


   PRN Reason: Tachycardia


   Last Admin: 10/09/21 10:36 Dose:  10 mg


   Documented by: 


Docusate Sodium (Docusate Sodium 100 Mg Cap)  100 mg PO Q12H PRN


   PRN Reason: Constipation


   Last Admin: 10/07/21 09:12 Dose:  100 mg


   Documented by: 


Guaifenesin (Guaifenesin 600 Mg Tab.Er)  600 mg PO BID [facility]


   Last Admin: 10/12/21 21:19 Dose:  600 mg


   Documented by: 


Loperamide HCl (Loperamide 2 Mg Cap)  2 mg PO Q4H PRN


   PRN Reason: Diarrhea


Methylprednisolone Sodium Succinate (Methylprednisolone Sodium Succinate 40 Mg/1

Ml Sdv)  60 mg IVPUSH Q12H [facility]


   Last Admin: 10/13/21 06:34 Dose:  60 mg


   Documented by: 


Metoprolol Tartrate (Metoprolol Tartrate 25 Mg Tab)  25 mg PO BID [facility]


Ondansetron HCl (Ondansetron 4 Mg/2 Ml Sdv)  4 mg IV Q6H PRN


   PRN Reason: Nausea/Vomiting


Sodium Chloride (Sodium Chloride 0.9% 10 Ml Syringe)  10 ml FLUSH ASDIRECTED PRN


   PRN Reason: Keep Vein Open


   Last Admin: 10/04/21 18:12 Dose:  10 ml


   Documented by: 


Warfarin Sodium (Pharmacy To Dose - Warfarin)  1 dose .XX ASDIRECTED PRN


   PRN Reason: RX TOD DOSE WARFARIN





Discontinued Medications





Albuterol (Albuterol 0.083% 2.5 Mg/3 Ml Neb Soln)  2.5 mg NEB ONETIME ONE


   Stop: 10/04/21 15:19


   Last Admin: 10/04/21 15:47 Dose:  2.5 mg


   Documented by: 


Albuterol/Ipratropium (Albuterol/Ipratropium 3.0-0.5 Mg/3 Ml Neb Soln)  3 ml NEB

ONETIME ONE


   Stop: 10/05/21 08:21


   Last Admin: 10/05/21 08:37 Dose:  3 ml


   Documented by: 


Apixaban (Apixaban 5 Mg Tab)  10 mg PO BID [facility]


   Stop: 10/12/21 21:01


   Last Admin: 10/07/21 09:13 Dose:  10 mg


   Documented by: 


Diltiazem HCl (Diltiazem Ir 30 Mg Tab)  30 mg PO Q8HR [facility]


   Last Admin: 10/10/21 05:51 Dose:  30 mg


   Documented by: 


Enoxaparin Sodium (Enoxaparin 40 Mg/0.4 Ml Syringe)  40 mg SUBCUT DAILY [facility]


   Last Admin: 10/05/21 14:49 Dose:  40 mg


   Documented by: 


Enoxaparin Sodium (Enoxaparin 60 Mg/0.6 Ml Syringe)  20 mg SUBCUT ONETIME ONE


   Stop: 10/05/21 16:31


   Last Admin: 10/05/21 16:53 Dose:  20 mg


   Documented by: 


Enoxaparin Sodium (Enoxaparin 60 Mg/0.6 Ml Syringe)  60 mg SUBCUT Q12H [facility]


   Last Admin: 10/10/21 12:17 Dose:  Not Given


   Documented by: 


Furosemide (Furosemide 40 Mg/4 Ml Vial)  40 mg IVPUSH NOW ONE


   Stop: 10/05/21 20:04


   Last Admin: 10/05/21 21:33 Dose:  40 mg


   Documented by: 


Furosemide (Furosemide 20 Mg/2 Ml Vial)  20 mg IVPUSH TIDMEALS [facility]


   Last Admin: 10/08/21 06:34 Dose:  20 mg


   Documented by: 


Furosemide (Furosemide 20 Mg/2 Ml Vial)  20 mg IVPUSH DAILY [facility]


Furosemide (Furosemide 20 Mg/2 Ml Vial)  20 mg IVPUSH DAILY [facility]


   Last Admin: 10/09/21 09:05 Dose:  20 mg


   Documented by: 


Sodium Chloride (Normal Saline)  100 mls @ 60 mls/min IV ASDIRECTED [facility]


   Last Admin: 10/04/21 18:12 Dose:  60 mls/min


   Documented by: 


Sodium Chloride (Normal Saline)  1,000 mls @ 250 mls/hr IV ASDIRECTED [facility]


   Last Admin: 10/04/21 22:14 Dose:  250 mls/hr


   Documented by: 


Ceftriaxone Sodium 1 gm/ (Sodium Chloride)  100 mls @ 200 mls/hr IV ONETIME ONE


   Stop: 10/05/21 09:25


   Last Admin: 10/05/21 09:20 Dose:  200 mls/hr


   Documented by: 


Vancomycin HCl 1 gm/ Sodium (Chloride)  250 mls @ 250 mls/hr IV Q18H [facility]


   Last Admin: 10/05/21 14:26 Dose:  Not Given


   Documented by: 


Vancomycin HCl 1 gm/ Sodium (Chloride)  250 mls @ 250 mls/hr IV Q18H [facility]


   Last Admin: 10/05/21 15:37 Dose:  Not Given


   Documented by: 


Piperacillin Sod/Tazobactam (Sod 4.5 gm/ Sodium Chloride)  100 mls @ 200 mls/hr 

IV ONETIME ONE


   Stop: 10/05/21 14:59


   Last Admin: 10/05/21 14:19 Dose:  200 mls/hr


   Documented by: 


Piperacillin Sod/Tazobactam (Sod 4.5 gm/ Sodium Chloride)  100 mls @ 25 mls/hr 

IV Q8H [facility]


   Last Admin: 10/08/21 06:34 Dose:  25 mls/hr


   Documented by: 


Vancomycin HCl 1 gm/ Sodium (Chloride)  250 mls @ 250 mls/hr IV Q18H [facility]


   Stop: 10/07/21 23:59


   Last Admin: 10/07/21 22:00 Dose:  250 mls/hr


   Documented by: 


Sodium Chloride (Normal Saline)  1,000 mls @ 100 mls/hr IV ASDIRECTED [facility]


Vancomycin HCl 1 gm/ Sodium (Chloride)  250 mls @ 250 mls/hr IV Q12H [facility]


Potassium Chloride 10 meq/ (Premix)  100 mls @ 100 mls/hr IV Q1H [facility]


   Stop: 10/08/21 12:59


   Last Admin: 10/08/21 15:37 Dose:  100 mls/hr


   Documented by: 


Sodium Chloride (Normal Saline)  250 mls @ 40 mls/hr IV ASDIRECTED [facility]


   Last Admin: 10/08/21 15:51 Dose:  40 mls/hr


   Documented by: 


Iopamidol (Iopamidol 755 Mg/Ml 100 Ml Bottle)  100 ml IVPUSH ONETIME ONE


   Stop: 10/04/21 18:12


   Last Admin: 10/04/21 18:12 Dose:  100 ml


   Documented by: 


Loperamide HCl (Loperamide 2 Mg Cap)  4 mg PO ONETIME ONE


   Stop: 10/11/21 10:51


   Last Admin: 10/11/21 11:06 Dose:  4 mg


   Documented by: 


Methylprednisolone Sodium Succinate (Methylprednisolone Sodium Succinate 125 

Mg/2 Ml Sdv)  125 mg IVPUSH ONETIME ONE


   Stop: 10/05/21 08:21


   Last Admin: 10/05/21 08:25 Dose:  125 mg


   Documented by: 


Methylprednisolone Sodium Succinate (Methylprednisolone Sodium Succinate 40 Mg/1

Ml Sdv)  60 mg IVPUSH Q8H [facility]


   Last Admin: 10/11/21 06:45 Dose:  60 mg


   Documented by: 


Metoprolol Tartrate (Metoprolol Tartrate 25 Mg Tab)  25 mg PO Q12H [facility]


   Last Admin: 10/12/21 21:19 Dose:  25 mg


   Documented by: 


Potassium Chloride (Potassium Chloride 10 Meq Tab.Er)  10 meq PO TID [facility]


   Last Admin: 10/07/21 22:00 Dose:  10 meq


   Documented by: 


Potassium Chloride (Potassium Chloride 20 Meq Tab.Er)  20 meq PO ONETIME ONE


   Stop: 10/06/21 07:21


   Last Admin: 10/06/21 08:37 Dose:  20 meq


   Documented by: 


Sodium Chloride (Sodium Chloride 0.9% 10 Ml Sdv)  10 ml FLUSH ONETIME ONE


   Stop: 10/04/21 18:12


   Last Admin: 10/04/21 20:31 Dose:  10 ml


   Documented by: 


Trimethoprim/Sulfamethoxazole (Sulfamethoxazole/Trimethoprim 800-160 Mg Tab)  1 

tab PO BID [facility]


   Last Admin: 10/11/21 08:44 Dose:  1 tab


   Documented by: 


Vancomycin HCl (Pharmacy To Dose - Vancomycin)  1 dose .XX ASDIRECTED [facility]


Warfarin Sodium (Warfarin 4 Mg Tab)  4 mg PO QPM [facility]


   Stop: 10/07/21 18:01


   Last Admin: 10/07/21 18:08 Dose:  4 mg


   Documented by: 


Warfarin Sodium (Warfarin 4 Mg Tab)  4 mg PO QPM [facility]


   Stop: 10/08/21 18:01


   Last Admin: 10/08/21 18:36 Dose:  4 mg


   Documented by: 


Warfarin Sodium (Warfarin 4 Mg Tab)  4 mg PO QPM [facility]


   Stop: 10/09/21 18:01


   Last Admin: 10/09/21 18:15 Dose:  4 mg


   Documented by: 


Warfarin Sodium (Warfarin 1 Mg Tab)  1 mg PO QPM [facility]


   Last Admin: 10/10/21 18:04 Dose:  1 mg


   Documented by: 


Warfarin Sodium (Warfarin Sliding Scale)  0 each PO QPM [facility]


   Stop: 10/11/21 18:01


   Last Admin: 10/11/21 17:46 Dose:  Not Given


   Documented by: 


Warfarin Sodium (Warfarin 3 Mg Tab)  3 mg PO QPM [facility]


   Stop: 10/12/21 18:01


   Last Admin: 10/12/21 19:19 Dose:  3 mg


   Documented by: 











- Exam


Quality Assessment: Supplemental Oxygen (55 L with an FiO2 of 80%), DVT 

Prophylaxis.  No: Urine Catheter


General: Alert, Oriented, Cooperative, No Acute Distress


HEENT: Pupils Equal, Pupils Reactive, Mucous Membr. Moist/Pink


Neck: Supple, Trachea Midline


Lungs: Decreased Breath Sounds, Crackles.  No: Normal Respiratory Effort 

(Tachypnea)


Cardiovascular: Regular Rate, Regular Rhythm


GI/Abdominal Exam: Normal Bowel Sounds, Soft, Non-Tender, No Distention


 (Female) Exam: Deferred


Back Exam: Normal Inspection, Full Range of Motion


Extremities: Normal Inspection, Normal Range of Motion, Non-Tender, No Pedal 

Edema, Normal Capillary Refill


Skin: Warm, Dry, Intact


Neurological: No New Focal Deficit


Psy/Mental Status: Alert, Normal Affect, Normal Mood





- Patient Data


Lab Results Last 24 hrs: 


                         Laboratory Results - last 24 hr











  10/13/21 10/13/21 10/13/21 Range/Units





  05:20 05:20 05:20 


 


WBC  12.41 H    (3.98-10.04)  K/mm3


 


RBC  3.95 L    (3.98-5.22)  M/mm3


 


Hgb  11.7    (11.2-15.7)  gm/dl


 


Hct  36.0    (34.1-44.9)  %


 


MCV  91.1    (79.4-94.8)  fl


 


MCH  29.6    (25.6-32.2)  pg


 


MCHC  32.5    (32.2-35.5)  g/dl


 


RDW Std Deviation  47.6 H    (36.4-46.3)  fL


 


Plt Count  393 H    (182-369)  K/mm3


 


MPV  10.3    (9.4-12.3)  fl


 


Neut % (Auto)  88.9 H    (34.0-71.1)  %


 


Lymph % (Auto)  5.0 L    (19.3-51.7)  %


 


Mono % (Auto)  4.7    (4.7-12.5)  %


 


Eos % (Auto)  0 L    (0.7-5.8)  


 


Baso % (Auto)  0.1    (0.1-1.2)  %


 


Neut # (Auto)  11.04 H    (1.56-6.13)  K/mm3


 


Lymph # (Auto)  0.62 L    (1.18-3.74)  K/mm3


 


Mono # (Auto)  0.58 H    (0.24-0.36)  K/mm3


 


Eos # (Auto)  0.00 L    (0.04-0.36)  K/mm3


 


Baso # (Auto)  0.01    (0.01-0.08)  K/mm3


 


PT   21.2 H   (9.7-12.0)  SECONDS


 


INR   1.96   


 


Sodium    136  (136-145)  mEq/L


 


Potassium    5.0  (3.5-5.1)  mEq/L


 


Chloride    106  ()  mEq/L


 


Carbon Dioxide    22  (21-32)  mEq/L


 


Anion Gap    13.0  (5-15)  


 


BUN    51 H  (7-18)  mg/dL


 


Creatinine    0.8  (0.55-1.02)  mg/dL


 


Est Cr Clr Drug Dosing    43.03  mL/min


 


Estimated GFR (MDRD)    > 60  (>60)  mL/min


 


BUN/Creatinine Ratio    63.8 H  (14-18)  


 


Glucose    111 H  (70-99)  mg/dL


 


Calcium    8.8  (8.5-10.1)  mg/dL


 


Magnesium    2.0  (1.8-2.4)  mg/dL


 


C-Reactive Protein    0.6  (<1.0)  mg/dL











Result Diagrams: 


                                 10/13/21 05:20





                                 10/13/21 05:20





Sepsis Event Note





- Evaluation


Sepsis Screening Result: No Definite Risk





- Focused Exam


Vital Signs: 


                                   Vital Signs











  Temp Pulse Resp BP Pulse Ox Pulse Ox


 


 10/13/21 06:15       91 L


 


 10/13/21 05:31  98.1 F  59 L  25 H  125/108 H  90 L 


 


 10/12/21 21:19   66   112/89  


 


 10/12/21 21:13  97.3 F  66  26 H  112/89  91 L 


 


 10/12/21 20:35       91 L














- Problem List & Annotations


(1) History of COVID-19


SNOMED Code(s): 435702273497780970, 867512567126130545


   Code(s): Z86.16 - PERSONAL HISTORY OF COVID-19   Status: Chronic   Priority: 

Medium   Current Visit: Yes   





(2) Elevated d-dimer


SNOMED Code(s): 888242902


   Code(s): R79.89 - OTHER SPECIFIED ABNORMAL FINDINGS OF BLOOD CHEMISTRY   

Status: Acute   Priority: Medium   Current Visit: Yes   





(3) Elevated troponin


SNOMED Code(s): 353367567, 619137607, 946386737


   Code(s): R77.8 - OTHER SPECIFIED ABNORMALITIES OF PLASMA PROTEINS   Status: 

Resolved   Priority: High   Current Visit: Yes   





(4) Pneumonia


SNOMED Code(s): 039507691


   Code(s): J18.9 - PNEUMONIA, UNSPECIFIED ORGANISM   Status: Ruled-out   Priori

ty: High   Current Visit: Yes   


Qualifiers: 


   Pneumonia type: due to unspecified organism   Laterality: unspecified 

laterality   Lung location: unspecified part of lung   Qualified Code(s): J18.9 

- Pneumonia, unspecified organism   





(5) Dyspnea


SNOMED Code(s): 756459381


   Code(s): R06.00 - DYSPNEA, UNSPECIFIED   Status: Acute   Priority: High   

Current Visit: Yes   


Qualifiers: 


   Dyspnea type: unspecified   Qualified Code(s): R06.00 - Dyspnea, unspecified 

 





(6) Hypoxia


SNOMED Code(s): 820799152


   Code(s): R09.02 - HYPOXEMIA   Status: Acute   Priority: High   Current Visit:

Yes   





(7) UTI (urinary tract infection)


SNOMED Code(s): 42242962


   Code(s): N39.0 - URINARY TRACT INFECTION, SITE NOT SPECIFIED   Status: Acute 

 Priority: High   Current Visit: Yes   


Qualifiers: 


   Urinary tract infection type: site unspecified   Hematuria presence: without 

hematuria   Qualified Code(s): N39.0 - Urinary tract infection, site not 

specified   





(8) Elevated brain natriuretic peptide (BNP) level


SNOMED Code(s): 049891291, 346160108


   Code(s): R79.89 - OTHER SPECIFIED ABNORMAL FINDINGS OF BLOOD CHEMISTRY   

Status: Acute   Priority: High   Current Visit: Yes   





(9) On home oxygen therapy


SNOMED Code(s): 748286015204


   Code(s): Z99.81 - DEPENDENCE ON SUPPLEMENTAL OXYGEN   Status: Chronic   

Priority: Medium   Current Visit: Yes   





(10) GERD (gastroesophageal reflux disease)


SNOMED Code(s): 179525806


   Code(s): K21.9 - GASTRO-ESOPHAGEAL REFLUX DISEASE WITHOUT ESOPHAGITIS   

Status: Chronic   Priority: Low   Current Visit: No   


Qualifiers: 


   Esophagitis presence: esophagitis presence not specified   Qualified Code(s):

K21.9 - Gastro-esophageal reflux disease without esophagitis   





(11) Meniere disease


SNOMED Code(s): 96938120


   Code(s): H81.09 - MENIERE'S DISEASE, UNSPECIFIED EAR   Status: Chronic   

Priority: Low   Current Visit: No   


Qualifiers: 


   Laterality: unspecified laterality   Qualified Code(s): H81.09 - Meniere's 

disease, unspecified ear   





(12) DVT (deep venous thrombosis)


SNOMED Code(s): 366603059


   Code(s): I82.409 - ACUTE EMBOLISM AND THOMBOS UNSP DEEP VN UNSP LOWER 

EXTREMITY   Status: Acute   Priority: High   Current Visit: Yes   


Qualifiers: 


   DVT location: lower extremity   Affected thrombotic vein of extremity: 

unspecified vein of extremity   Chronicity: acute   Laterality: left   Qualified

Code(s): I82.402 - Acute embolism and thrombosis of unspecified deep veins of 

left lower extremity   





(13) Hypotension


SNOMED Code(s): 26794926


   Code(s): I95.9 - HYPOTENSION, UNSPECIFIED   Status: Resolved   Priority: High

  Current Visit: Yes   


Qualifiers: 


   Hypotension type: unspecified hypotension type   Qualified Code(s): I95.9 - 

Hypotension, unspecified   





(14) Acute and chronic respiratory failure


SNOMED Code(s): 74963176


   Code(s): J96.20 - ACUTE AND CHR RESP FAILURE, UNSP W HYPOXIA OR HYPERCAPNIA  

Status: Acute   Priority: High   Current Visit: Yes   


Qualifiers: 


   Respiratory failure complication: hypoxia   Qualified Code(s): J96.21 - Acute

and chronic respiratory failure with hypoxia   





(15) Myocarditis


SNOMED Code(s): 11540266


   Code(s): I51.4 - MYOCARDITIS, UNSPECIFIED   Status: Suspected   Priority: 

High   Current Visit: Yes   


Qualifiers: 


   Myocarditis type: infective   Infective myocarditis organism: viral   

Chronicity: acute   Qualified Code(s): I40.0 - Infective myocarditis   





(16) Hypokalemia


SNOMED Code(s): 04580721


   Code(s): E87.6 - HYPOKALEMIA   Status: Resolved   Priority: High   Current 

Visit: Yes   





(17) Pulmonary fibrosis


SNOMED Code(s): 19069118


   Code(s): J84.10 - PULMONARY FIBROSIS, UNSPECIFIED   Status: Suspected   

Priority: High   Current Visit: Yes   





(18) Diarrhea


SNOMED Code(s): 18444431


   Code(s): R19.7 - DIARRHEA, UNSPECIFIED   Status: Acute   Priority: Medium   

Current Visit: Yes   


Qualifiers: 


   Diarrhea type: unspecified type   Qualified Code(s): R19.7 - Diarrhea, 

unspecified   





(19) New onset a-fib


SNOMED Code(s): 19202025


   Code(s): I48.91 - UNSPECIFIED ATRIAL FIBRILLATION   Status: Acute   Priority:

High   Current Visit: Yes   





- Problem List Review


Problem List Initiated/Reviewed/Updated: Yes





- My Orders


Last 24 Hours: 


My Active Orders





10/14/21 05:11


BASIC METABOLIC PANEL,BMP [CHEM] AM 


CBC WITH AUTO DIFF [HEME] AM 


CRP [C-REACTIVE PROTEIN] [CHEM] AM 


INR,PT,PROTHROMBIN TIME [COAG] AM 


MAGNESIUM [CHEM] AM 





10/15/21 05:11


BASIC METABOLIC PANEL,BMP [CHEM] AM 


CBC WITH AUTO DIFF [HEME] AM 


CRP [C-REACTIVE PROTEIN] [CHEM] AM 


INR,PT,PROTHROMBIN TIME [COAG] AM 


MAGNESIUM [CHEM] AM 














- Assessment


Assessment:: 


Admission assessment - 10/5/2021


* 81-year-old female who presents to ED on 10/4/2021 with low oxygen saturations


* History of Mnire's disease, GERD, and prior Covid pneumonia


* Was hospitalized from 9/13/2021 through 9/27/2021 with COVID-19 pneumonia at 

  [facility] in [place]. 


* Discharged home on 4 L of oxygen with activity after completing 4 days of 

  remdesivir and 10 days of Decadron. 


* Of note patient was noted to have episodes of bradycardia and hypotension with

   heart rate in the 30s and 40s. She was noticed to have episodes of Mobitz 

  type I AV block and 2.5-second sinus pauses. It was believed this was due to 

  remdesivir however this continued after stopping. Patient was to follow-up wit

  h EP and have a 2-week cardiac monitor after discharge. 


* Prior to presenting to the ED she was noted to have saturations in the low 

  50s. 


* Patient's  increased her oxygen to 8 L via nasal cannula. 


* Denies any recent fever, chills, nausea, vomiting, diarrhea, urinary symptoms,

   or smoking history. 


* Of note patient did have acute cystitis with an E. coli UTI well in the 

  hospital in [place] and completed 5 days of Rocephin there. 


* 12-lead EKG was obtained showing a sinus tachycardia at 109 bpm with a LAFB 

  and very mild ST elevation in V2 and V3. 


* Placed on a nonrebreather mask which improved her saturations to 90 to 91%. 


* Noted to be dyspneic and only able to complete a few word sentences. 


* Labs are obtained:


   * WBC of 8.57. 


   * Hemoglobin 13.0. 


   * Hematocrit 30.5. 


   * Platelet 250,000. 


   * Neutrophils are elevated 75.7%. 


   * D-dimer is very high at 8.16. 


   * Sodium is low at 129. 


   * Potassium 3.8. 


   * Chloride 94. 


   * Carbon dioxide 24. 


   * Anion gap is 14.8. 


   * BUN is 15. Creatinine 0.6. GFR greater than 60. 


   * Glucose is 126. 


   * Calcium 8.7. 


   * Magnesium 1.8. 


   * Total bilirubin 0.6. 


   * AST is 36, ALT 31, alkaline phosphatase 86. 


   * Troponin is elevated at 0.127-->0.156-->0.067. 


   * CRP is very high at 29.3. 


   * Protein 7.1. 


   * Albumin 2.1. 


   * proBNP is 3117


   * UA is obtained and is mildly positive with slightly cloudy urine, 1+ 

     protein, 3+ ketones, 2+ occult blood, 1+ leukocyte esterase, 5-10 RBCs, 20-

     30 WBCs, rare WBC clumps, and moderate bacteria.


* ABG obtained in the left radial with a pH of 7.44. PCO2 of 30.9. PO2 of 58.0. 

  HCO3 of 20.5. O2 saturations 86%. Base excess is -2.3. Aa gradient is 15.0. 

  This is obtained while on nonrebreather at 15 L. 


* Facility was out of CPAP and BiPAP-> started on high flow O2 with saturations 

  in the low 90s. 


* CTA of the chest is obtained to rule out PE and shows:


   * 1. No findings of pulmonary embolism. 


   * 2. Minimal left-sided pleural effusion. 


   * 3. Diffuse emphysematous changes seen. Both lungs show diffuse increased 

     density uncertain how much of this represents diffuse fibrosis versus 

     possible superimposed pneumonia. Old comparison studies would be needed if 

     available. 


   * 4. 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 


   * 5. Other findings which are felt to be chronic as described above. 


* Chest x-ray shows scattered infiltrates bilaterally most prominent in the left

   lower lobe. 


* Plan was to transfer the patient for continued care due to elevated troponin 

  however no beds are noted to be available [place] or 

  [place]. 


* Noted to have a blood pressure of 80/61 and is given a 500 mill fluid bolus 

  over 2 hours. 


* Given 1 dose of Rocephin in the ED. She is also given 125 mg Solu-Medrol and a

   DuoNeb, which she reports greatly helped.


* Ultimately inpatient bed does open up at our facility and she is admitted to 

  the floor on telemetry for management of her hypoxia, suspected pneumonia, 

  elevated BNP, elevated troponin - likely demand ischemia, and questionable 

  UTI. 


* On floor bilateral lower extremity ultrasound shows thrombus within the left 

  mid and distal superficial, femoral, popliteal, posterior tibial, and peroneal

   veins.





10/6/2021


This is a 81 years old female post Covid infection who was hospitalized in 

[place] for approximately 2 weeks who presented to our ED with low saturations.

  On admission D-dimer was noted to be elevated however CTA was negative for any

 PE.  Lower extremity ultrasound did show a DVT in the left extremity and 

patient was started on 1 mg/kg Lovenox transitioning to 10 mg twice daily 

Eliquis today.  Patient has been requiring high flow 60 L and was weaned down to

 80% FiO2 today.  Unfortunately given patient's history of recent Covid 

infection CTA was not very helpful in differentiating pulmonary cause.  Given 

recent hospitalization there are concerns of a secondary bacterial infection.  

WBC has been WNL however neutrophils are elevated.  Procalcitonin was obtained 

and was 0.29.  Lactic acid yesterday was 1.0.  Patient was noted to be 

hypotensive in the emergency room and was given 2 fluid boluses.  She was also 

started on fluids over concerns of sepsis.  UA was positive and urine culture is

 growing out greater than 100,000 CFU's of gram-negative rods thus far.  She 

does have a history of a E. coli UTI treated with Rocephin in [place].  Patient

 denies any urinary symptoms.  proBNP was elevated.  Echocardiogram was obtained

 on 10/5/2021  And interpreted as: " 1.  The left ventricular internal cavity 

size is normal. 2.  Normal left ventricular systolic function. 3.  Left 

ventricular ejection fraction, by visual estimation, is 55 to 60%. 4.  No 

regional wall motion abnormalities. 5.  Mild proximal septal hypertrophy. 6.  

Aortic valve is tricuspid. 7.  Mild mitral valve regurgitation. 8.  Trace 

tricuspid valve regurgitation. 9.  Mild pulmonic valve regurgitation. 10.  The 

inferior vena cava is normal. 11.  The right ventricular systolic pressure is 

mildly elevated at 32.2 mmHg."  Patient was started on 20 mg IV push Lasix 3 

times daily yesterday and has had over 1 L output.  Potassium today is down to 

3.3 and this is being supplemented.  Sodium 137.  Carbon dioxide 24.  Anion gap 

is 13.3.  BUN is 19.  Creatinine 0.6.  GFR greater than 60.  Glucose is 146.  

Calcium 8.4.  Magnesium is 1.8.  Total bilirubin 0.5.  AST is 26, ALT 26, 

alkaline phosphatase 90.  Troponin today was down to 0.031.  CRP is down to 

24.2.  Albumin is down to 1.8.  Mycoplasma and strep pneumonia were both checked

 and were negative.  Respiratory viral panel was obtained and was negative 

however patient did return positive for Covid, which is unsurprising given the 

patient's recent known Covid infection.  We will continue current treatment 

plan.  It is felt it is prudent to continue IV antibiotics for now given 

patient's symptoms and lab results.  We will continue diuresis and Eliquis for 

DVT treatment.





10/7/2021


This is a 81-year-old female post Covid infection who was admitted for suspected

 pneumonia, pulmonary fibrosis, myocarditis, DVT, and apparent UTI.  Labs today 

show WBC of 8.94.  Hemoglobin is 11.1.  Neutrophils are elevated 77.7%.  Smear 

review feels slight toxic granulation and abnormal RBC morphology.  Sodium today

 is 140.  Potassium 3.7.  Chloride 103.  Carbon dioxide 20.  Anion gap is 12.7. 

 BUN is 25.  Creatinine 0.8.  GFR greater than 60.  Magnesium is 1.9.  Bilirubin

 0.5.  AST is 24, ALT 29, alkaline phosphatase is 90.  CRP is down to 12.5.  

Albumin is 1.8.  Patient does report that she feels quite a bit better today.  

She is down on high flow to 55 L with an FiO2 of 70% with saturations of upper 

80s to low 90s.  Urine culture continues to show greater than 100,000 CFU's of 

E. coli.  Sensitivities are pending.  Blood cultures have returned negative.  

Strep pneumonia is negative.  Discussed plan of care with Dr. Newsome who 

recommends patient receive 1 more dose of Lasix and then either discontinue or 

continue with 20 mg daily.  We will continue vancomycin and Zosyn due to 

concerns over possible pneumonia.  Unfortunately today patient reported that her

 insurance company tells her Eliquis will be $600 for 30-day supply.  She states

 she cannot afford this.  Apparently Xarelto is around the same pricing per case

 management.  We will therefore discontinue Eliquis start patient on 1 mg/kg 

Lovenox twice daily and start warfarin with pharmacy to dose.  Patient has had 

no fevers.  We will continue current treatment plan and hope to wean patient off

 of high flow.  Unknown length of stay due to severity of symptoms.





10/8/2021


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

 fibrosis, suspected myocarditis, DVT and UTI.  Labs today show WBC of 8.33.  

Hemoglobin is 11.7.  Neutrophils are 70.6.  INR is 1.20.  Sodium is 137.  

Potassium 3.2.  Chloride 98.  Carbon dioxide 29.  Anion gap is 13.2.  BUN is 29.

  Creatinine 1.0.  GFR 53.  Glucose 101.  CRP is up to 16.2.  Albumin is 1.9.  

Discussed plan of care with Dr. Russ, attending hospitalist.  We will at this 

time discontinue vancomycin and Zosyn and switch patient to Bactrim DS twice 

daily based on E. coli urine sensitivities.  We will repeat a procalcitonin.  We

 will also decrease Lasix to 20 mg IV push daily.  Patient's potassium will be 

supplemented.  Pharmacy continues to dose warfarin and patient remains on 1 

mg/kg twice daily Lovenox bridging.  Will start patient on 60 mg every 8 hour IV

 push methylprednisolone.  Patient has been reporting difficulty with swallowing

 pills.  Because of this we will order an SLP evaluation.  We have made some 

improvement with her high flow she is now at 55 L with an FiO2 of 65%.  We will 

continue to wean as patient tolerates.  Unknown length of stay due to severity 

of symptoms.





10/9/2021


81-year-old female with post Covid admitted for suspected pneumonia, pulmonary 

fibrosis, suspected myocarditis, DVT, and UTI developed A. fib with RVR with 

rates in the 110s to 120s+ this morning.  This required increasing oxygen 

support with high flow nasal cannula at 60 L with FiO2 of 95% and nonrebreather 

mask.  Patient continues to be DNR/DNI.  She was given 1 dose of Cardizem 10 mg 

IV x1.  Blood pressure was stable with systolic in the 120s.  After rate control

 was achieved we were able to remove the nonrebreather mask.  CRP continues to 

be elevated at 12.7.  She was started on methylprednisolone yesterday and 

switched vancomycin and Zosyn to Bactrim for her UTI.  Procalcitonin did return 

at 0.18 reassuring us that she does not have a severe bacterial sepsis.  Patient

 is getting warfarin secondary to DVT with INR today at 1.85.  She continues on 

Lovenox until therapeutic INR.


EKG shows diffuse T wave inversion consistent with heart strain.  Troponin was 

drawn and increased to 0.14.  Also suggesting some component of heart strain 

versus infarction.  EKG also demonstrates new onset atrial fibrillation.  She is

 not a candidate for transfer at this time and is already on full dose Lovenox 

and INR is 1.8 as we titrate up her warfarin.





10/10/2021


Patient has had a marginal improvement with high flow nasal cannula at 60 L and 

FiO2 of 90%.  She no longer has a nonrebreather mask over it.  Her INR is now 

supratherapeutic and her Lovenox will be held.  Pharmacy is dosing INR. 

Creatinine is 1.4 with estimated GFR of 36 and BUN of 49.  Lasix was stopped 

after the morning dose yesterday.  Today is day 6 of antibiotics for her UTI and

 she should be able to stop them tomorrow.  White count has increased to 12.5, 

but she has been placed on moderately high Solu-Medrol.  INR is also likely 

higher because of the Bactrim.  This will make getting her INR stabilized 

difficult.





10/11/2021


This is a 81-year-old female post Covid who was admitted due to acute on chronic

 respiratory failure.  She has been requiring high flow and is currently on 55 L

 with an FiO2 of 85%.  She is initially treated for possible pneumonia and given

 labs and CT results it appears she has more of a pulmonary fibrosis picture.  

She was noted to have a UTI and was switched to Bactrim DS and she will complete

 treatment today.  Questioning myocarditis.  She also has a left leg DVT and was

 started on Eliquis, however patient has concerns with affording this and she 

was switched to warfarin.  INR today is 4.21.  She was started on steroids and 

will be decreased to 60 mg IV push Solu-Medrol twice daily today.  Given the 

Bactrim and Solu-Medrol her INR has been somewhat variable with difficult to 

dose warfarin.  Pharmacy continues to dose this.  WBC today is 11.29.  

Hemoglobin 11.3.  Platelet 391,000.  Neutrophils are 87%.  Sodium 135.  

Potassium 4.4.  Chloride 104.  Carbon dioxide 25.  Anion gap 10.4.  BUN is 55.  

Creatinine 1.1.  GFR is up to 48.  Glucose is 126.  Phosphorus 3.4.  Magnesium 

2.2.  Total bilirubin 0.2.  AST is 18, ALT 24, alkaline phosphatase 62.  CRP is 

down to 3.2.  Protein is 5.9.  Albumin is down to 1.9.  Patient was complaining 

of some diarrhea today and given all of her antibiotic she was on we checked a 

C. difficile which was negative.  We will therefore schedule Imodium.  Unknown 

length of stay due to severity of symptoms and concern over minimal improvement 

in oxygenation.  We will look at possible swing bed/LTAC facilities in the 

future.





10/12/2021


This is a 81-year-old female post COVID-19 pneumonia who was admitted to the 

floor due to acute respiratory failure now believed to be secondary to pulmonary

 fibrosis.  She was also noted to have a left leg DVT and was started on Eliquis

 ultimately is being switched to warfarin with Lovenox bridging.  UA and culture

 were suggestive of E. coli UTI and she completed treatment for this.  Remains 

on high flow 50 L with an FiO2 of 90% and we have not been able to make much 

movement with this.  She continues to have dyspnea with exertion.  She has mild 

diarrhea which is improving.  C. difficile screen was negative.  Labs today show

 WBC which is elevated at 12.79, likely secondary to steroid use.  Platelet is 

423.  INR on warfarin is therapeutic at 2.50.  Sodium is 136.  Potassium 4.9.  

Chloride 104.  Carbon oxide 25.  Anion gap 11.9.  BUN is 55.  Creatinine 1.0.  

GFR is up to 53.  Calcium is 8.9.  Magnesium is 2.2.  CRP is down to 1.8.  

Overall she remains stable.   was in room today and all questions were 

answered.  We discussed the possibility of a transfer to an LTAC such as [facility] 

and [facility] and they are very interested in this.  We discussed how she may 

never recover from this and if she does it will be very slow progress.  She is 

currently on 60 mg twice daily Solu-Medrol and we will continue to wean this.  

Plan will be to discharge to LTAC once available.





10/13/2021


This is a 81-year-old female post Covid found to have pulmonary fibrosis.  She 

is remained in a sinus rhythm and there have been no calls on telemetry.  She is

 on high flow we have made some improvement there with 55 L and 80% FiO2.  Labs 

today show a WBC of 12.41, which is likely steroid related.  Platelets are 

393,000.  Neutrophils are 88.9%.  INR is a bit low at 1.96 and pharmacy is dosi

ng warfarin for her DVT.  This will likely be a somewhat moving target due to 

patient requiring steroids.  She has completed treatment for UTI.  Electrolytes 

have remained good.  Renal function has improved with a BUN of 51.  Creatinine 

0.8.  GFR greater than 60.  CRP is down to 0.6.  We will decrease steroids today

 to 60 mg IV push methylprednisolone daily and continue to wean.  We have 

discussed LTAC care with the family and they are in agreement to this as it is 

likely the best option for the patient.  [facility] is reviewing patient 

chart at this time.  Patient will remain hospitalized until placement can be 

arranged.





- Plan


Plan:: 


Dyspnea


Hypoxia


History of COVID-19


On home oxygen therapy


Acute on chronic respiratory failure 


Pulmonary fibrosis -on CT scan


* Droplet isolation (airborne/contact while on high flow O2)


* O2 as needed to keep saturation greater than 90%


* I-S/Acapella


* Discontinued zosyn and vancomycin on 10/8/2021


* High flow oxygen as directed


* 4 times daily scheduled DuoNebs


* Every 2 hours as needed albuterol nebulizer


* Consult RT


* Telemetry


* Blood cultures negative 


* Continuous pulse oximetry


* Mucinex BID


* Recommend PFT after recovery due to emphysematous changes noted in CTA


* Recommend pulmonology follow-up after discharge


* Repeat procalcitonin 0.18 on 10/8/2021


* Continue solu-medrol 60mg BID


* Continue to follow CRP





DVT


Elevated d-dimer


* CTA in ED negative for PE


* Discontinue Eliquis as patient reports $600 for 30 day supply


* Warfarin with pharmacy to dose; INR for today therapeutic 


* DC Lovenox 60mg (1mg/kg) BID as bridging 





Myocarditis - suspected 


Elevated troponin, resolved 


Elevated brain natriuretic peptide (BNP) level


New onset A-fib


* Troponin increased returned to normal of 0.056 and she is back in sinus rhythm


* Metoprolol tartrate - 25mg BID


* Continue 81mg daily aspirin


* DC Cardizem and switch to PRN


* Echocardiogram obtained as noted 


* Monitor patient's weight


* Monitor I&O's


* Telemetry


* Continue to hold Lasix


* Supplement potassium as needed 


* Monitor CRP





UTI (urinary tract infection), Resolved 


* UA weakly positivepatient was treated for E. coli UTI with Rocephin in 

  [place]


* Urine culture showing >100cfu E. Coli, with good sensitivities to Bactrim DS


* Blood cultures negative thus far 


* Switch from zosyn/vancomycin to Bactrim DS BID PO --> Completed treatment on 

  10/11/2021





GERD (gastroesophageal reflux disease)


* No acute concerns


* Monitor 





Meniere disease


* No acute concerns


* Monitor





Hypokalemiaresolved


* Supplement as needed





Hypotension - resolved


* Noted in ED and multiple fluid boluses given


* Monitor 





Dysphagia


* Patient reports difficulty swallowing pills


* SLP evaluation -> crush pills





Diarrhea, improved 


* Check c. diff -->negative


* ContinuePRN Imodium





Code status: DNR/DNI


PCP: Dr. Monroe in [place]


DVT prophylaxis: Warfarin


Disposition: Patient admitted to the floor for management and further work-up of

 suspected pneumonia, hypoxia, elevated troponin, elevated D-dimer, and weak 

UTI.  





Length of stay greater than 96 hours due to need for continued treatment.





Unfortunately very poor overall prognosis

## 2021-10-14 RX ADMIN — GUAIFENESIN SCH MG: 100 SOLUTION ORAL at 20:50

## 2021-10-14 RX ADMIN — GUAIFENESIN SCH MG: 100 SOLUTION ORAL at 17:21

## 2021-10-14 RX ADMIN — ALBUTEROL SULFATE PRN MG: 2.5 SOLUTION RESPIRATORY (INHALATION) at 23:28

## 2021-10-14 RX ADMIN — SODIUM CHLORIDE SCH MG: 0.9 INJECTION, SOLUTION INTRAVENOUS at 08:54

## 2021-10-14 RX ADMIN — GUAIFENESIN SCH MG: 100 SOLUTION ORAL at 13:03

## 2021-10-14 RX ADMIN — GUAIFENESIN SCH MG: 100 SOLUTION ORAL at 10:08

## 2021-10-14 NOTE — PCM.PN
- General Info


Date of Service: 10/14/21


Admission Dx/Problem (Free Text): 


                           Admission Diagnosis/Problem





Admission Diagnosis/Problem      Hypoxia








Functional Status: Reports: Pain Controlled, Tolerating Diet, Ambulating, 

Urinating, Incentive Spirometry.  Denies: New Symptoms





- Review of Systems


General: Reports: Weakness.  Denies: Fever, Fatigue, Malaise, Chills


HEENT: Reports: No Symptoms.  Denies: Headaches, Sore Throat


Pulmonary: Reports: Shortness of Breath, Cough.  Denies: Pleuritic Chest Pain, 

Sputum, Wheezing


Cardiovascular: Reports: Dyspnea on Exertion.  Denies: Chest Pain, Palpitations


Gastrointestinal: Reports: No Symptoms.  Denies: Abdominal Pain, Constipation, 

Diarrhea, Nausea, Vomiting


Genitourinary: Reports: No Symptoms.  Denies: Pain


Musculoskeletal: Reports: No Symptoms


Skin: Reports: No Symptoms.  Denies: Cyanosis


Neurological: Reports: No Symptoms, Difficulty Walking, Weakness.  Denies: 

Confusion, Dizziness, Headache, Numbness, Pre-Existing Deficit, Seizure, 

Syncope, Tingling, Tremors, Gait Disturbance


Psychiatric: Reports: No Symptoms





- Patient Data


Vitals - Most Recent: 


                                Last Vital Signs











Temp  97.2 F   10/14/21 03:51


 


Pulse  63   10/14/21 03:51


 


Resp  24 H  10/14/21 03:51


 


BP  111/91 H  10/14/21 03:51


 


Pulse Ox  90 L  10/14/21 06:00











Weight - Most Recent: 131 lb 11.2 oz


I&O - Last 24 Hours: 


                                 Intake & Output











 10/13/21 10/13/21 10/14/21





 14:59 22:59 06:59


 


Intake Total  400 500


 


Output Total  200 480


 


Balance  200 20











Lab Results Last 24 Hours: 


                         Laboratory Results - last 24 hr











  10/14/21 10/14/21 Range/Units





  05:12 05:12 


 


WBC  17.12 H   (3.98-10.04)  K/mm3


 


RBC  4.28   (3.98-5.22)  M/mm3


 


Hgb  12.7   (11.2-15.7)  gm/dl


 


Hct  38.6   (34.1-44.9)  %


 


MCV  90.2   (79.4-94.8)  fl


 


MCH  29.7   (25.6-32.2)  pg


 


MCHC  32.9   (32.2-35.5)  g/dl


 


RDW Std Deviation  47.4 H   (36.4-46.3)  fL


 


Plt Count  432 H   (182-369)  K/mm3


 


MPV  9.9   (9.4-12.3)  fl


 


Neut % (Auto)  84.4 H   (34.0-71.1)  %


 


Lymph % (Auto)  7.7 L   (19.3-51.7)  %


 


Mono % (Auto)  5.6   (4.7-12.5)  %


 


Eos % (Auto)  0 L   (0.7-5.8)  


 


Baso % (Auto)  0.2   (0.1-1.2)  %


 


Neut # (Auto)  14.46 H   (1.56-6.13)  K/mm3


 


Lymph # (Auto)  1.31   (1.18-3.74)  K/mm3


 


Mono # (Auto)  0.96 H   (0.24-0.36)  K/mm3


 


Eos # (Auto)  0.00 L   (0.04-0.36)  K/mm3


 


Baso # (Auto)  0.03   (0.01-0.08)  K/mm3


 


Sodium   135 L  (136-145)  mEq/L


 


Potassium   4.6  (3.5-5.1)  mEq/L


 


Chloride   106  ()  mEq/L


 


Carbon Dioxide   22  (21-32)  mEq/L


 


Anion Gap   11.6  (5-15)  


 


BUN   45 H  (7-18)  mg/dL


 


Creatinine   0.7  (0.55-1.02)  mg/dL


 


Est Cr Clr Drug Dosing   49.18  mL/min


 


Estimated GFR (MDRD)   > 60  (>60)  mL/min


 


BUN/Creatinine Ratio   64.3 H  (14-18)  


 


Glucose   88  (70-99)  mg/dL


 


Calcium   8.7  (8.5-10.1)  mg/dL


 


Magnesium   1.8  (1.8-2.4)  mg/dL


 


C-Reactive Protein   0.3  (<1.0)  mg/dL











Med Orders - Current: 


                               Current Medications





Al Hydroxide/Mg Hydroxide (Aluminum Hydroxide/Magnesium Hydroxide/Simethicone 

Susp 30 Ml Cup)  30 ml PO Q4H PRN


   PRN Reason: Heartburn


   Last Admin: 10/08/21 23:27 Dose:  30 ml


   Documented by: 


Albuterol (Albuterol 0.083% 2.5 Mg/3 Ml Neb Soln)  2.5 mg NEB Q2H PRN


   PRN Reason: Shortness Of Breath/wheezing


   Last Admin: 10/11/21 03:26 Dose:  2.5 mg


   Documented by: 


Albuterol/Ipratropium (Albuterol/Ipratropium 3.0-0.5 Mg/3 Ml Neb Soln)  3 ml NEB

QIDRT [facility]


   Last Admin: 10/14/21 05:59 Dose:  3 ml


   Documented by: 


Aspirin (Aspirin 81 Mg Tab.Ec)  81 mg PO DAILY [facility]


   Last Admin: 10/13/21 09:06 Dose:  81 mg


   Documented by: 


Diltiazem HCl (Diltiazem 50 Mg/10 Ml Sdv)  10 mg IVPUSH Q1H PRN


   PRN Reason: Tachycardia


   Last Admin: 10/09/21 10:36 Dose:  10 mg


   Documented by: 


Docusate Sodium (Docusate Sodium 100 Mg Cap)  100 mg PO Q12H PRN


   PRN Reason: Constipation


   Last Admin: 10/07/21 09:12 Dose:  100 mg


   Documented by: 


Guaifenesin (Guaifenesin 600 Mg Tab.Er)  600 mg PO BID [facility]


   Last Admin: 10/13/21 20:21 Dose:  600 mg


   Documented by: 


Loperamide HCl (Loperamide 2 Mg Cap)  2 mg PO Q4H PRN


   PRN Reason: Diarrhea


   Last Admin: 10/13/21 10:46 Dose:  2 mg


   Documented by: 


Methylprednisolone Sodium Succinate (Methylprednisolone Sodium Succinate 40 Mg/1

Ml Sdv)  60 mg IVPUSH DAILY [facility]


   Last Admin: 10/13/21 09:06 Dose:  Not Given


   Documented by: 


Metoprolol Tartrate (Metoprolol Tartrate 25 Mg Tab)  25 mg PO BID [facility]


   Last Admin: 10/13/21 20:21 Dose:  25 mg


   Documented by: 


Ondansetron HCl (Ondansetron 4 Mg/2 Ml Sdv)  4 mg IV Q6H PRN


   PRN Reason: Nausea/Vomiting


Sodium Chloride (Sodium Chloride 0.9% 10 Ml Syringe)  10 ml FLUSH ASDIRECTED PRN


   PRN Reason: Keep Vein Open


   Last Admin: 10/04/21 18:12 Dose:  10 ml


   Documented by: 


Warfarin Sodium (Pharmacy To Dose - Warfarin)  1 dose .XX ASDIRECTED PRN


   PRN Reason: RX TOD DOSE WARFARIN





Discontinued Medications





Albuterol (Albuterol 0.083% 2.5 Mg/3 Ml Neb Soln)  2.5 mg NEB ONETIME ONE


   Stop: 10/04/21 15:19


   Last Admin: 10/04/21 15:47 Dose:  2.5 mg


   Documented by: 


Albuterol/Ipratropium (Albuterol/Ipratropium 3.0-0.5 Mg/3 Ml Neb Soln)  3 ml NEB

ONETIME ONE


   Stop: 10/05/21 08:21


   Last Admin: 10/05/21 08:37 Dose:  3 ml


   Documented by: 


Apixaban (Apixaban 5 Mg Tab)  10 mg PO BID [facility]


   Stop: 10/12/21 21:01


   Last Admin: 10/07/21 09:13 Dose:  10 mg


   Documented by: 


Diltiazem HCl (Diltiazem Ir 30 Mg Tab)  30 mg PO Q8HR [facility]


   Last Admin: 10/10/21 05:51 Dose:  30 mg


   Documented by: 


Enoxaparin Sodium (Enoxaparin 40 Mg/0.4 Ml Syringe)  40 mg SUBCUT DAILY [facility]


   Last Admin: 10/05/21 14:49 Dose:  40 mg


   Documented by: 


Enoxaparin Sodium (Enoxaparin 60 Mg/0.6 Ml Syringe)  20 mg SUBCUT ONETIME ONE


   Stop: 10/05/21 16:31


   Last Admin: 10/05/21 16:53 Dose:  20 mg


   Documented by: 


Enoxaparin Sodium (Enoxaparin 60 Mg/0.6 Ml Syringe)  60 mg SUBCUT Q12H [facility]


   Last Admin: 10/10/21 12:17 Dose:  Not Given


   Documented by: 


Furosemide (Furosemide 40 Mg/4 Ml Vial)  40 mg IVPUSH NOW ONE


   Stop: 10/05/21 20:04


   Last Admin: 10/05/21 21:33 Dose:  40 mg


   Documented by: 


Furosemide (Furosemide 20 Mg/2 Ml Vial)  20 mg IVPUSH TIDMEALS [facility]


   Last Admin: 10/08/21 06:34 Dose:  20 mg


   Documented by: 


Furosemide (Furosemide 20 Mg/2 Ml Vial)  20 mg IVPUSH DAILY [facility]


Furosemide (Furosemide 20 Mg/2 Ml Vial)  20 mg IVPUSH DAILY [facility]


   Last Admin: 10/09/21 09:05 Dose:  20 mg


   Documented by: 


Sodium Chloride (Normal Saline)  100 mls @ 60 mls/min IV ASDIRECTED [facility]


   Last Admin: 10/04/21 18:12 Dose:  60 mls/min


   Documented by: 


Sodium Chloride (Normal Saline)  1,000 mls @ 250 mls/hr IV ASDIRECTED [facility]


   Last Admin: 10/04/21 22:14 Dose:  250 mls/hr


   Documented by: 


Ceftriaxone Sodium 1 gm/ (Sodium Chloride)  100 mls @ 200 mls/hr IV ONETIME ONE


   Stop: 10/05/21 09:25


   Last Admin: 10/05/21 09:20 Dose:  200 mls/hr


   Documented by: 


Vancomycin HCl 1 gm/ Sodium (Chloride)  250 mls @ 250 mls/hr IV Q18H [facility]


   Last Admin: 10/05/21 14:26 Dose:  Not Given


   Documented by: 


Vancomycin HCl 1 gm/ Sodium (Chloride)  250 mls @ 250 mls/hr IV Q18H [facility]


   Last Admin: 10/05/21 15:37 Dose:  Not Given


   Documented by: 


Piperacillin Sod/Tazobactam (Sod 4.5 gm/ Sodium Chloride)  100 mls @ 200 mls/hr 

IV ONETIME ONE


   Stop: 10/05/21 14:59


   Last Admin: 10/05/21 14:19 Dose:  200 mls/hr


   Documented by: 


Piperacillin Sod/Tazobactam (Sod 4.5 gm/ Sodium Chloride)  100 mls @ 25 mls/hr 

IV Q8H [facility]


   Last Admin: 10/08/21 06:34 Dose:  25 mls/hr


   Documented by: 


Vancomycin HCl 1 gm/ Sodium (Chloride)  250 mls @ 250 mls/hr IV Q18H [facility]


   Stop: 10/07/21 23:59


   Last Admin: 10/07/21 22:00 Dose:  250 mls/hr


   Documented by: 


Sodium Chloride (Normal Saline)  1,000 mls @ 100 mls/hr IV ASDIRECTED [facility]


Vancomycin HCl 1 gm/ Sodium (Chloride)  250 mls @ 250 mls/hr IV Q12H [facility]


Potassium Chloride 10 meq/ (Premix)  100 mls @ 100 mls/hr IV Q1H [facility]


   Stop: 10/08/21 12:59


   Last Admin: 10/08/21 15:37 Dose:  100 mls/hr


   Documented by: 


Sodium Chloride (Normal Saline)  250 mls @ 40 mls/hr IV ASDIRECTED [facility]


   Last Admin: 10/08/21 15:51 Dose:  40 mls/hr


   Documented by: 


Iopamidol (Iopamidol 755 Mg/Ml 100 Ml Bottle)  100 ml IVPUSH ONETIME ONE


   Stop: 10/04/21 18:12


   Last Admin: 10/04/21 18:12 Dose:  100 ml


   Documented by: 


Loperamide HCl (Loperamide 2 Mg Cap)  4 mg PO ONETIME ONE


   Stop: 10/11/21 10:51


   Last Admin: 10/11/21 11:06 Dose:  4 mg


   Documented by: 


Methylprednisolone Sodium Succinate (Methylprednisolone Sodium Succinate 125 

Mg/2 Ml Sdv)  125 mg IVPUSH ONETIME ONE


   Stop: 10/05/21 08:21


   Last Admin: 10/05/21 08:25 Dose:  125 mg


   Documented by: 


Methylprednisolone Sodium Succinate (Methylprednisolone Sodium Succinate 40 Mg/1

Ml Sdv)  60 mg IVPUSH Q8H [facility]


   Last Admin: 10/11/21 06:45 Dose:  60 mg


   Documented by: 


Methylprednisolone Sodium Succinate (Methylprednisolone Sodium Succinate 40 Mg/1

Ml Sdv)  60 mg IVPUSH Q12H [facility]


   Last Admin: 10/13/21 06:34 Dose:  60 mg


   Documented by: 


Metoprolol Tartrate (Metoprolol Tartrate 25 Mg Tab)  25 mg PO Q12H [facility]


   Last Admin: 10/12/21 21:19 Dose:  25 mg


   Documented by: 


Potassium Chloride (Potassium Chloride 10 Meq Tab.Er)  10 meq PO TID [facility]


   Last Admin: 10/07/21 22:00 Dose:  10 meq


   Documented by: 


Potassium Chloride (Potassium Chloride 20 Meq Tab.Er)  20 meq PO ONETIME ONE


   Stop: 10/06/21 07:21


   Last Admin: 10/06/21 08:37 Dose:  20 meq


   Documented by: 


Sodium Chloride (Sodium Chloride 0.9% 10 Ml Sdv)  10 ml FLUSH ONETIME ONE


   Stop: 10/04/21 18:12


   Last Admin: 10/04/21 20:31 Dose:  10 ml


   Documented by: 


Trimethoprim/Sulfamethoxazole (Sulfamethoxazole/Trimethoprim 800-160 Mg Tab)  1 

tab PO BID [facility]


   Last Admin: 10/11/21 08:44 Dose:  1 tab


   Documented by: 


Vancomycin HCl (Pharmacy To Dose - Vancomycin)  1 dose .XX ASDIRECTED [facility]


Warfarin Sodium (Warfarin 4 Mg Tab)  4 mg PO QPM [facility]


   Stop: 10/07/21 18:01


   Last Admin: 10/07/21 18:08 Dose:  4 mg


   Documented by: 


Warfarin Sodium (Warfarin 4 Mg Tab)  4 mg PO QPM [facility]


   Stop: 10/08/21 18:01


   Last Admin: 10/08/21 18:36 Dose:  4 mg


   Documented by: 


Warfarin Sodium (Warfarin 4 Mg Tab)  4 mg PO QPM [facility]


   Stop: 10/09/21 18:01


   Last Admin: 10/09/21 18:15 Dose:  4 mg


   Documented by: 


Warfarin Sodium (Warfarin 1 Mg Tab)  1 mg PO QPM [facility]


   Last Admin: 10/10/21 18:04 Dose:  1 mg


   Documented by: 


Warfarin Sodium (Warfarin Sliding Scale)  0 each PO QPM [facility]


   Stop: 10/11/21 18:01


   Last Admin: 10/11/21 17:46 Dose:  Not Given


   Documented by: 


Warfarin Sodium (Warfarin 3 Mg Tab)  3 mg PO QPM [facility]


   Stop: 10/12/21 18:01


   Last Admin: 10/12/21 19:19 Dose:  3 mg


   Documented by: 


Warfarin Sodium (Warfarin 3 Mg Tab)  3 mg PO QPM [facility]


   Stop: 10/13/21 18:01


   Last Admin: 10/13/21 17:13 Dose:  3 mg


   Documented by: 











- Exam


Quality Assessment: Supplemental Oxygen (55 L with an FiO2 of 70%.), DVT 

Prophylaxis.  No: Urine Catheter


General: Alert, Oriented, Cooperative, No Acute Distress


HEENT: Pupils Equal, Pupils Reactive, Mucous Membr. Moist/Pink


Neck: Supple, Trachea Midline


Lungs: Normal Respiratory Effort, Decreased Breath Sounds, Crackles


Cardiovascular: Regular Rate, Regular Rhythm


GI/Abdominal Exam: Normal Bowel Sounds, Soft, Non-Tender, No Distention


 (Female) Exam: Deferred


Back Exam: Normal Inspection, Full Range of Motion


Extremities: Normal Inspection, Normal Range of Motion, Non-Tender, No Pedal 

Edema, Normal Capillary Refill


Skin: Warm, Dry, Intact


Neurological: No New Focal Deficit


Psy/Mental Status: Alert, Normal Affect, Normal Mood





- Patient Data


Lab Results Last 24 hrs: 


                         Laboratory Results - last 24 hr











  10/14/21 10/14/21 Range/Units





  05:12 05:12 


 


WBC  17.12 H   (3.98-10.04)  K/mm3


 


RBC  4.28   (3.98-5.22)  M/mm3


 


Hgb  12.7   (11.2-15.7)  gm/dl


 


Hct  38.6   (34.1-44.9)  %


 


MCV  90.2   (79.4-94.8)  fl


 


MCH  29.7   (25.6-32.2)  pg


 


MCHC  32.9   (32.2-35.5)  g/dl


 


RDW Std Deviation  47.4 H   (36.4-46.3)  fL


 


Plt Count  432 H   (182-369)  K/mm3


 


MPV  9.9   (9.4-12.3)  fl


 


Neut % (Auto)  84.4 H   (34.0-71.1)  %


 


Lymph % (Auto)  7.7 L   (19.3-51.7)  %


 


Mono % (Auto)  5.6   (4.7-12.5)  %


 


Eos % (Auto)  0 L   (0.7-5.8)  


 


Baso % (Auto)  0.2   (0.1-1.2)  %


 


Neut # (Auto)  14.46 H   (1.56-6.13)  K/mm3


 


Lymph # (Auto)  1.31   (1.18-3.74)  K/mm3


 


Mono # (Auto)  0.96 H   (0.24-0.36)  K/mm3


 


Eos # (Auto)  0.00 L   (0.04-0.36)  K/mm3


 


Baso # (Auto)  0.03   (0.01-0.08)  K/mm3


 


Sodium   135 L  (136-145)  mEq/L


 


Potassium   4.6  (3.5-5.1)  mEq/L


 


Chloride   106  ()  mEq/L


 


Carbon Dioxide   22  (21-32)  mEq/L


 


Anion Gap   11.6  (5-15)  


 


BUN   45 H  (7-18)  mg/dL


 


Creatinine   0.7  (0.55-1.02)  mg/dL


 


Est Cr Clr Drug Dosing   49.18  mL/min


 


Estimated GFR (MDRD)   > 60  (>60)  mL/min


 


BUN/Creatinine Ratio   64.3 H  (14-18)  


 


Glucose   88  (70-99)  mg/dL


 


Calcium   8.7  (8.5-10.1)  mg/dL


 


Magnesium   1.8  (1.8-2.4)  mg/dL


 


C-Reactive Protein   0.3  (<1.0)  mg/dL











Result Diagrams: 


                                 10/14/21 05:12





                                 10/14/21 05:12





Sepsis Event Note





- Evaluation


Sepsis Screening Result: Sepsis Risk





- Focused Exam


Vital Signs: 


                                   Vital Signs











  Temp Pulse Resp BP Pulse Ox Pulse Ox


 


 10/14/21 06:00       90 L


 


 10/14/21 03:51  97.2 F  63  24 H  111/91 H  88 L 


 


 10/13/21 20:21   78   128/84  


 


 10/13/21 20:19  97.5 F  78  26 H  128/84  95 


 


 10/13/21 20:10       90 L














- Problem List & Annotations


(1) History of COVID-19


SNOMED Code(s): 988385855809116776, 743411047023842657


   Code(s): Z86.16 - PERSONAL HISTORY OF COVID-19   Status: Chronic   Priority: 

Medium   Current Visit: Yes   





(2) Elevated d-dimer


SNOMED Code(s): 507733635


   Code(s): R79.89 - OTHER SPECIFIED ABNORMAL FINDINGS OF BLOOD CHEMISTRY   

Status: Acute   Priority: Medium   Current Visit: Yes   





(3) Elevated troponin


SNOMED Code(s): 659356635, 133947943, 467704870


   Code(s): R77.8 - OTHER SPECIFIED ABNORMALITIES OF PLASMA PROTEINS   Status: R

esolved   Priority: High   Current Visit: Yes   





(4) Pneumonia


SNOMED Code(s): 176088646


   Code(s): J18.9 - PNEUMONIA, UNSPECIFIED ORGANISM   Status: Ruled-out   

Priority: High   Current Visit: Yes   


Qualifiers: 


   Pneumonia type: due to unspecified organism   Laterality: unspecified 

laterality   Lung location: unspecified part of lung   Qualified Code(s): J18.9 

- Pneumonia, unspecified organism   





(5) Dyspnea


SNOMED Code(s): 449969693


   Code(s): R06.00 - DYSPNEA, UNSPECIFIED   Status: Acute   Priority: High   

Current Visit: Yes   


Qualifiers: 


   Dyspnea type: unspecified   Qualified Code(s): R06.00 - Dyspnea, unspecified 

 





(6) Hypoxia


SNOMED Code(s): 115620994


   Code(s): R09.02 - HYPOXEMIA   Status: Acute   Priority: High   Current Visit:

Yes   





(7) UTI (urinary tract infection)


SNOMED Code(s): 13408452


   Code(s): N39.0 - URINARY TRACT INFECTION, SITE NOT SPECIFIED   Status: Acute 

 Priority: High   Current Visit: Yes   


Qualifiers: 


   Urinary tract infection type: site unspecified   Hematuria presence: without 

hematuria   Qualified Code(s): N39.0 - Urinary tract infection, site not 

specified   





(8) Elevated brain natriuretic peptide (BNP) level


SNOMED Code(s): 474654317, 312195958


   Code(s): R79.89 - OTHER SPECIFIED ABNORMAL FINDINGS OF BLOOD CHEMISTRY   

Status: Acute   Priority: High   Current Visit: Yes   





(9) On home oxygen therapy


SNOMED Code(s): 181413439449


   Code(s): Z99.81 - DEPENDENCE ON SUPPLEMENTAL OXYGEN   Status: Chronic   

Priority: Medium   Current Visit: Yes   





(10) GERD (gastroesophageal reflux disease)


SNOMED Code(s): 276584871


   Code(s): K21.9 - GASTRO-ESOPHAGEAL REFLUX DISEASE WITHOUT ESOPHAGITIS   

Status: Chronic   Priority: Low   Current Visit: No   


Qualifiers: 


   Esophagitis presence: esophagitis presence not specified   Qualified Code(s):

K21.9 - Gastro-esophageal reflux disease without esophagitis   





(11) Meniere disease


SNOMED Code(s): 35715077


   Code(s): H81.09 - MENIERE'S DISEASE, UNSPECIFIED EAR   Status: Chronic   

Priority: Low   Current Visit: No   


Qualifiers: 


   Laterality: unspecified laterality   Qualified Code(s): H81.09 - Meniere's 

disease, unspecified ear   





(12) DVT (deep venous thrombosis)


SNOMED Code(s): 997985529


   Code(s): I82.409 - ACUTE EMBOLISM AND THOMBOS UNSP DEEP VN UNSP LOWER 

EXTREMITY   Status: Acute   Priority: High   Current Visit: Yes   


Qualifiers: 


   DVT location: lower extremity   Affected thrombotic vein of extremity: 

unspecified vein of extremity   Chronicity: acute   Laterality: left   Qualified

Code(s): I82.402 - Acute embolism and thrombosis of unspecified deep veins of 

left lower extremity   





(13) Hypotension


SNOMED Code(s): 81165175


   Code(s): I95.9 - HYPOTENSION, UNSPECIFIED   Status: Resolved   Priority: High

  Current Visit: Yes   


Qualifiers: 


   Hypotension type: unspecified hypotension type   Qualified Code(s): I95.9 - H

ypotension, unspecified   





(14) Acute and chronic respiratory failure


SNOMED Code(s): 95290865


   Code(s): J96.20 - ACUTE AND CHR RESP FAILURE, UNSP W HYPOXIA OR HYPERCAPNIA  

Status: Acute   Priority: High   Current Visit: Yes   


Qualifiers: 


   Respiratory failure complication: hypoxia   Qualified Code(s): J96.21 - Acute

and chronic respiratory failure with hypoxia   





(15) Myocarditis


SNOMED Code(s): 75556461


   Code(s): I51.4 - MYOCARDITIS, UNSPECIFIED   Status: Suspected   Priority: 

High   Current Visit: Yes   


Qualifiers: 


   Myocarditis type: infective   Infective myocarditis organism: viral   Ch

ronicity: acute   Qualified Code(s): I40.0 - Infective myocarditis   





(16) Hypokalemia


SNOMED Code(s): 37497796


   Code(s): E87.6 - HYPOKALEMIA   Status: Resolved   Priority: High   Current 

Visit: Yes   





(17) Pulmonary fibrosis


SNOMED Code(s): 10114372


   Code(s): J84.10 - PULMONARY FIBROSIS, UNSPECIFIED   Status: Suspected   

Priority: High   Current Visit: Yes   





(18) Diarrhea


SNOMED Code(s): 72524762


   Code(s): R19.7 - DIARRHEA, UNSPECIFIED   Status: Acute   Priority: Medium   

Current Visit: Yes   


Qualifiers: 


   Diarrhea type: unspecified type   Qualified Code(s): R19.7 - Diarrhea, un

specified   





(19) New onset a-fib


SNOMED Code(s): 32903894


   Code(s): I48.91 - UNSPECIFIED ATRIAL FIBRILLATION   Status: Acute   Priority:

High   Current Visit: Yes   





- Problem List Review


Problem List Initiated/Reviewed/Updated: Yes





- My Orders


Last 24 Hours: 


My Active Orders





10/13/21 09:00


methylPREDNISolone Sod Succ [Solu-MEDROL]   60 mg IVPUSH DAILY 





10/14/21 05:12


INR,PT,PROTHROMBIN TIME [COAG] AM 





10/15/21 05:11


BASIC METABOLIC PANEL,BMP [CHEM] AM 


CBC WITH AUTO DIFF [HEME] AM 


CRP [C-REACTIVE PROTEIN] [CHEM] AM 


INR,PT,PROTHROMBIN TIME [COAG] AM 


MAGNESIUM [CHEM] AM 














- Assessment


Assessment:: 


Admission assessment - 10/5/2021


* 81-year-old female who presents to ED on 10/4/2021 with low oxygen saturations


* History of Mnire's disease, GERD, and prior Covid pneumonia


* Was hospitalized from 9/13/2021 through 9/27/2021 with COVID-19 pneumonia at 

  [facility] in [place]. 


* Discharged home on 4 L of oxygen with activity after completing 4 days of 

  remdesivir and 10 days of Decadron. 


* Of note patient was noted to have episodes of bradycardia and hypotension with

   heart rate in the 30s and 40s. She was noticed to have episodes of Mobitz 

  type I AV block and 2.5-second sinus pauses. It was believed this was due to r

  emdesivir however this continued after stopping. Patient was to follow-up with

   EP and have a 2-week cardiac monitor after discharge. 


* Prior to presenting to the ED she was noted to have saturations in the low 

  50s. 


* Patient's  increased her oxygen to 8 L via nasal cannula. 


* Denies any recent fever, chills, nausea, vomiting, diarrhea, urinary symptoms,

   or smoking history. 


* Of note patient did have acute cystitis with an E. coli UTI well in the 

  hospital in [place] and completed 5 days of Rocephin there. 


* 12-lead EKG was obtained showing a sinus tachycardia at 109 bpm with a LAFB 

  and very mild ST elevation in V2 and V3. 


* Placed on a nonrebreather mask which improved her saturations to 90 to 91%. 


* Noted to be dyspneic and only able to complete a few word sentences. 


* Labs are obtained:


   * WBC of 8.57. 


   * Hemoglobin 13.0. 


   * Hematocrit 30.5. 


   * Platelet 250,000. 


   * Neutrophils are elevated 75.7%. 


   * D-dimer is very high at 8.16. 


   * Sodium is low at 129. 


   * Potassium 3.8. 


   * Chloride 94. 


   * Carbon dioxide 24. 


   * Anion gap is 14.8. 


   * BUN is 15. Creatinine 0.6. GFR greater than 60. 


   * Glucose is 126. 


   * Calcium 8.7. 


   * Magnesium 1.8. 


   * Total bilirubin 0.6. 


   * AST is 36, ALT 31, alkaline phosphatase 86. 


   * Troponin is elevated at 0.127-->0.156-->0.067. 


   * CRP is very high at 29.3. 


   * Protein 7.1. 


   * Albumin 2.1. 


   * proBNP is 3117


   * UA is obtained and is mildly positive with slightly cloudy urine, 1+ 

     protein, 3+ ketones, 2+ occult blood, 1+ leukocyte esterase, 5-10 RBCs, 20-

     30 WBCs, rare WBC clumps, and moderate bacteria.


* ABG obtained in the left radial with a pH of 7.44. PCO2 of 30.9. PO2 of 58.0. 

  HCO3 of 20.5. O2 saturations 86%. Base excess is -2.3. Aa gradient is 15.0. 

  This is obtained while on nonrebreather at 15 L. 


* Facility was out of CPAP and BiPAP-> started on high flow O2 with saturations 

  in the low 90s. 


* CTA of the chest is obtained to rule out PE and shows:


   * 1. No findings of pulmonary embolism. 


   * 2. Minimal left-sided pleural effusion. 


   * 3. Diffuse emphysematous changes seen. Both lungs show diffuse increased 

     density uncertain how much of this represents diffuse fibrosis versus 

     possible superimposed pneumonia. Old comparison studies would be needed if 

     available. 


   * 4. 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 


   * 5. Other findings which are felt to be chronic as described above. 


* Chest x-ray shows scattered infiltrates bilaterally most prominent in the left

   lower lobe. 


* Plan was to transfer the patient for continued care due to elevated troponin 

  however [place] beds are noted to be available [place] or 

  [place]. 


* Noted to have a blood pressure of 80/61 and is given a 500 mill fluid bolus 

  over 2 hours. 


* Given 1 dose of Rocephin in the ED. She is also given 125 mg Solu-Medrol and a

   DuoNeb, which she reports greatly helped.


* Ultimately inpatient bed does open up at our facility and she is admitted to 

  the floor on telemetry for management of her hypoxia, suspected pneumonia, 

  elevated BNP, elevated troponin - likely demand ischemia, and questionable 

  UTI. 


* On floor bilateral lower extremity ultrasound shows thrombus within the left 

  mid and distal superficial, femoral, popliteal, posterior tibial, and peroneal

   veins.





10/6/2021


This is a 81 years old female post Covid infection who was hospitalized in 

[place] for approximately 2 weeks who presented to our ED with low saturations.

  On admission D-dimer was noted to be elevated however CTA was negative for any

 PE.  Lower extremity ultrasound did show a DVT in the left extremity and 

patient was started on 1 mg/kg Lovenox transitioning to 10 mg twice daily 

Eliquis today.  Patient has been requiring high flow 60 L and was weaned down to

 80% FiO2 today.  Unfortunately given patient's history of recent Covid 

infection CTA was not very helpful in differentiating pulmonary cause.  Given 

recent hospitalization there are concerns of a secondary bacterial infection.  

WBC has been WNL however neutrophils are elevated.  Procalcitonin was obtained 

and was 0.29.  Lactic acid yesterday was 1.0.  Patient was noted to be 

hypotensive in the emergency room and was given 2 fluid boluses.  She was also 

started on fluids over concerns of sepsis.  UA was positive and urine culture is

 growing out greater than 100,000 CFU's of gram-negative rods thus far.  She 

does have a history of a E. coli UTI treated with Rocephin in [place].  Patient

 denies any urinary symptoms.  proBNP was elevated.  Echocardiogram was obtained

 on 10/5/2021  And interpreted as: " 1.  The left ventricular internal cavity 

size is normal. 2.  Normal left ventricular systolic function. 3.  Left 

ventricular ejection fraction, by visual estimation, is 55 to 60%. 4.  No 

regional wall motion abnormalities. 5.  Mild proximal septal hypertrophy. 6.  

Aortic valve is tricuspid. 7.  Mild mitral valve regurgitation. 8.  Trace 

tricuspid valve regurgitation. 9.  Mild pulmonic valve regurgitation. 10.  The 

inferior vena cava is normal. 11.  The right ventricular systolic pressure is 

mildly elevated at 32.2 mmHg."  Patient was started on 20 mg IV push Lasix 3 

times daily yesterday and has had over 1 L output.  Potassium today is down to 

3.3 and this is being supplemented.  Sodium 137.  Carbon dioxide 24.  Anion gap 

is 13.3.  BUN is 19.  Creatinine 0.6.  GFR greater than 60.  Glucose is 146.  

Calcium 8.4.  Magnesium is 1.8.  Total bilirubin 0.5.  AST is 26, ALT 26, 

alkaline phosphatase 90.  Troponin today was down to 0.031.  CRP is down to 

24.2.  Albumin is down to 1.8.  Mycoplasma and strep pneumonia were both checked

 and were negative.  Respiratory viral panel was obtained and was negative 

however patient did return positive for Covid, which is unsurprising given the 

patient's recent known Covid infection.  We will continue current treatment 

plan.  It is felt it is prudent to continue IV antibiotics for now given 

patient's symptoms and lab results.  We will continue diuresis and Eliquis for 

DVT treatment.





10/7/2021


This is a 81-year-old female post Covid infection who was admitted for suspected

 pneumonia, pulmonary fibrosis, myocarditis, DVT, and apparent UTI.  Labs today 

show WBC of 8.94.  Hemoglobin is 11.1.  Neutrophils are elevated 77.7%.  Smear 

review feels slight toxic granulation and abnormal RBC morphology.  Sodium today

 is 140.  Potassium 3.7.  Chloride 103.  Carbon dioxide 20.  Anion gap is 12.7. 

 BUN is 25.  Creatinine 0.8.  GFR greater than 60.  Magnesium is 1.9.  Bilirubin

 0.5.  AST is 24, ALT 29, alkaline phosphatase is 90.  CRP is down to 12.5.  

Albumin is 1.8.  Patient does report that she feels quite a bit better today.  

She is down on high flow to 55 L with an FiO2 of 70% with saturations of upper 

80s to low 90s.  Urine culture continues to show greater than 100,000 CFU's of 

E. coli.  Sensitivities are pending.  Blood cultures have returned negative.  

Strep pneumonia is negative.  Discussed plan of care with Dr. Newsome who 

recommends patient receive 1 more dose of Lasix and then either discontinue or 

continue with 20 mg daily.  We will continue vancomycin and Zosyn due to 

concerns over possible pneumonia.  Unfortunately today patient reported that her

 insurance company tells her Eliquis will be $600 for 30-day supply.  She states

 she cannot afford this.  Apparently Xarelto is around the same pricing per case

 management.  We will therefore discontinue Eliquis start patient on 1 mg/kg 

Lovenox twice daily and start warfarin with pharmacy to dose.  Patient has had 

no fevers.  We will continue current treatment plan and hope to wean patient off

 of high flow.  Unknown length of stay due to severity of symptoms.





10/8/2021


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

 fibrosis, suspected myocarditis, DVT and UTI.  Labs today show WBC of 8.33.  

Hemoglobin is 11.7.  Neutrophils are 70.6.  INR is 1.20.  Sodium is 137.  P

otassium 3.2.  Chloride 98.  Carbon dioxide 29.  Anion gap is 13.2.  BUN is 29. 

 Creatinine 1.0.  GFR 53.  Glucose 101.  CRP is up to 16.2.  Albumin is 1.9.  

Discussed plan of care with Dr. Russ, attending hospitalist.  We will at this 

time discontinue vancomycin and Zosyn and switch patient to Bactrim DS twice 

daily based on E. coli urine sensitivities.  We will repeat a procalcitonin.  We

 will also decrease Lasix to 20 mg IV push daily.  Patient's potassium will be 

supplemented.  Pharmacy continues to dose warfarin and patient remains on 1 

mg/kg twice daily Lovenox bridging.  Will start patient on 60 mg every 8 hour IV

 push methylprednisolone.  Patient has been reporting difficulty with swallowing

 pills.  Because of this we will order an SLP evaluation.  We have made some 

improvement with her high flow she is now at 55 L with an FiO2 of 65%.  We will 

continue to wean as patient tolerates.  Unknown length of stay due to severity 

of symptoms.





10/9/2021


81-year-old female with post Covid admitted for suspected pneumonia, pulmonary 

fibrosis, suspected myocarditis, DVT, and UTI developed A. fib with RVR with 

rates in the 110s to 120s+ this morning.  This required increasing oxygen 

support with high flow nasal cannula at 60 L with FiO2 of 95% and nonrebreather 

mask.  Patient continues to be DNR/DNI.  She was given 1 dose of Cardizem 10 mg 

IV x1.  Blood pressure was stable with systolic in the 120s.  After rate control

 was achieved we were able to remove the nonrebreather mask.  CRP continues to 

be elevated at 12.7.  She was started on methylprednisolone yesterday and 

switched vancomycin and Zosyn to Bactrim for her UTI.  Procalcitonin did return 

at 0.18 reassuring us that she does not have a severe bacterial sepsis.  Patient

 is getting warfarin secondary to DVT with INR today at 1.85.  She continues on 

Lovenox until therapeutic INR.


EKG shows diffuse T wave inversion consistent with heart strain.  Troponin was 

drawn and increased to 0.14.  Also suggesting some component of heart strain 

versus infarction.  EKG also demonstrates new onset atrial fibrillation.  She is

 not a candidate for transfer at this time and is already on full dose Lovenox 

and INR is 1.8 as we titrate up her warfarin.





10/10/2021


Patient has had a marginal improvement with high flow nasal cannula at 60 L and 

FiO2 of 90%.  She no longer has a nonrebreather mask over it.  Her INR is now 

supratherapeutic and her Lovenox will be held.  Pharmacy is dosing INR. 

Creatinine is 1.4 with estimated GFR of 36 and BUN of 49.  Lasix was stopped 

after the morning dose yesterday.  Today is day 6 of antibiotics for her UTI and

 she should be able to stop them tomorrow.  White count has increased to 12.5, 

but she has been placed on moderately high Solu-Medrol.  INR is also likely 

higher because of the Bactrim.  This will make getting her INR stabilized 

difficult.





10/11/2021


This is a 81-year-old female post Covid who was admitted due to acute on chronic

 respiratory failure.  She has been requiring high flow and is currently on 55 L

 with an FiO2 of 85%.  She is initially treated for possible pneumonia and given

 labs and CT results it appears she has more of a pulmonary fibrosis picture.  

She was noted to have a UTI and was switched to Bactrim DS and she will complete

 treatment today.  Questioning myocarditis.  She also has a left leg DVT and was

 started on Eliquis, however patient has concerns with affording this and she 

was switched to warfarin.  INR today is 4.21.  She was started on steroids and 

will be decreased to 60 mg IV push Solu-Medrol twice daily today.  Given the 

Bactrim and Solu-Medrol her INR has been somewhat variable with difficult to 

dose warfarin.  Pharmacy continues to dose this.  WBC today is 11.29.  

Hemoglobin 11.3.  Platelet 391,000.  Neutrophils are 87%.  Sodium 135.  

Potassium 4.4.  Chloride 104.  Carbon dioxide 25.  Anion gap 10.4.  BUN is 55.  

Creatinine 1.1.  GFR is up to 48.  Glucose is 126.  Phosphorus 3.4.  Magnesium 

2.2.  Total bilirubin 0.2.  AST is 18, ALT 24, alkaline phosphatase 62.  CRP is 

down to 3.2.  Protein is 5.9.  Albumin is down to 1.9.  Patient was complaining 

of some diarrhea today and given all of her antibiotic she was on we checked a 

C. difficile which was negative.  We will therefore schedule Imodium.  Unknown 

length of stay due to severity of symptoms and concern over minimal improvement 

in oxygenation.  We will look at possible swing bed/LTAC facilities in the 

future.





10/12/2021


This is a 81-year-old female post COVID-19 pneumonia who was admitted to the 

floor due to acute respiratory failure now believed to be secondary to pulmonary

 fibrosis.  She was also noted to have a left leg DVT and was started on Eliquis

 ultimately is being switched to warfarin with Lovenox bridging.  UA and culture

 were suggestive of E. coli UTI and she completed treatment for this.  Remains 

on high flow 50 L with an FiO2 of 90% and we have not been able to make much 

movement with this.  She continues to have dyspnea with exertion.  She has mild 

diarrhea which is improving.  C. difficile screen was negative.  Labs today show

 WBC which is elevated at 12.79, likely secondary to steroid use.  Platelet is 

423.  INR on warfarin is therapeutic at 2.50.  Sodium is 136.  Potassium 4.9.  

Chloride 104.  Carbon oxide 25.  Anion gap 11.9.  BUN is 55.  Creatinine 1.0.  

GFR is up to 53.  Calcium is 8.9.  Magnesium is 2.2.  CRP is down to 1.8.  

Overall she remains stable.   was in room today and all questions were 

answered.  We discussed the possibility of a transfer to an LTAC such as [facility] 

and [place] and they are very interested in this.  We discussed how she may 

never recover from this and if she does it will be very slow progress.  She is 

currently on 60 mg twice daily Solu-Medrol and we will continue to wean this.  

Plan will be to discharge to LTAC once available.





10/13/2021


This is a 81-year-old female post Covid found to have pulmonary fibrosis.  She 

is remained in a sinus rhythm and there have been no calls on telemetry.  She is

 on high flow we have made some improvement there with 55 L and 80% FiO2.  Labs 

today show a WBC of 12.41, which is likely steroid related.  Platelets are 39

3,000.  Neutrophils are 88.9%.  INR is a bit low at 1.96 and pharmacy is dosing 

warfarin for her DVT.  This will likely be a somewhat moving target due to 

patient requiring steroids.  She has completed treatment for UTI.  Electrolytes 

have remained good.  Renal function has improved with a BUN of 51.  Creatinine 

0.8.  GFR greater than 60.  CRP is down to 0.6.  We will decrease steroids today

 to 60 mg IV push methylprednisolone daily and continue to wean.  We have 

discussed LTAC care with the family and they are in agreement to this as it is 

likely the best option for the patient.  [facility] in [place] is reviewing patient 

chart at this time.  Patient will remain hospitalized until placement can be 

arranged.





10/14/2021


This is a 81-year-old female admitted to the floor for hypoxia post Covid and 

found to have pulmonary fibrosis.  WBC today is up to 17.2, likely steroid 

related.  We will decrease her steroids from 60 mg daily to 40 mg daily and 

continue to wean.  Hemoglobin is 12.7.  Platelets 432,000.  Neutrophils are 

elevated at 84.4%.  INR is 2.39.  Sodium 135.  Potassium 4.6.  Chloride 106.  

Carbon dioxide 22.  Anion gap is 11.6.  BUN is 45.  Creatinine 0.7.  GFR greater

 than 60.  Magnesium is 1.8.  CRP is down to 0.3.  She is currently on high flow

 oxygen 55 L with an FiO2 of 70%.  We have been making very slow but fairly 

steady gains on her oxygen.  She reports she feels pretty good.  She remains on 

warfarin for her left lower extremity DVT.  She has been very pleasant and her 

 has been visiting regularly.  We are hopeful for LTAC placement in the 

near future.





- Plan


Plan:: 


Dyspnea


Hypoxia


History of COVID-19


On home oxygen therapy


Acute on chronic respiratory failure 


Pulmonary fibrosis -on CT scan


* Droplet isolation (airborne/contact while on high flow O2)


* O2 as needed to keep saturation greater than 90%


* I-S/Acapella


* Discontinued zosyn and vancomycin on 10/8/2021


* High flow oxygen as directed


* 4 times daily scheduled DuoNebs


* Every 2 hours as needed albuterol nebulizer


* Consult RT


* Telemetry


* Blood cultures negative 


* Continuous pulse oximetry


* Mucinex BID


* Recommend PFT after recovery due to emphysematous changes noted in CTA


* Recommend pulmonology follow-up after discharge


* Repeat procalcitonin 0.18 on 10/8/2021


* Decrease solumedrol to 40mg daily 


* Continue to follow CRP





DVT


Elevated d-dimer


* CTA in ED negative for PE


* Discontinue Eliquis as patient reports $600 for 30 day supply


* Warfarin with pharmacy to dose 


* DC Lovenox 60mg (1mg/kg) BID as bridging 


* Daily INR





Myocarditis - suspected 


Elevated troponin, resolved 


Elevated brain natriuretic peptide (BNP) level


New onset A-fib


* Troponin increased returned to normal of 0.056 and she is back in sinus rhythm


* Metoprolol tartrate - 25mg BID


* Continue 81mg daily aspirin


* DC Cardizem and switch to PRN


* Echocardiogram obtained as noted 


* Monitor patient's weight


* Monitor I&O's


* Telemetry


* Continue to hold Lasix


* Supplement potassium as needed 


* Monitor CRP





UTI (urinary tract infection), Resolved 


* UA weakly positivepatient was treated for E. coli UTI with Rocephin in 

  [place]


* Urine culture showing >100cfu E. Coli, with good sensitivities to Bactrim DS


* Blood cultures negative thus far 


* Switch from zosyn/vancomycin to Bactrim DS BID PO --> Completed treatment on 

  10/11/2021





GERD (gastroesophageal reflux disease)


* No acute concerns


* Monitor 





Meniere disease


* No acute concerns


* Monitor





Hypokalemiaresolved


* Supplement as needed





Hypotension - resolved


* Noted in ED and multiple fluid boluses given


* Monitor 





Dysphagia


* Patient reports difficulty swallowing pills


* SLP evaluation -> crush pills





Diarrhea, improved to resolved 


* Check c. diff -->negative


* Continue PRN Imodium





Code status: DNR/DNI


PCP: Dr. Monroe in [place]


DVT prophylaxis: Warfarin


Disposition: Patient admitted to the floor for management and further work-up of

 suspected pneumonia, hypoxia, elevated troponin, elevated D-dimer, and weak 

UTI.  





Length of stay greater than 96 hours due to need for continued treatment, 

hopeful for LTAC placement in the near future.





Unfortunately very poor overall prognosis

## 2021-10-15 RX ADMIN — GUAIFENESIN SCH MG: 100 SOLUTION ORAL at 08:45

## 2021-10-15 RX ADMIN — GUAIFENESIN SCH: 100 SOLUTION ORAL at 02:48

## 2021-10-15 RX ADMIN — DILTIAZEM HYDROCHLORIDE PRN MG: 5 INJECTION INTRAVENOUS at 09:29

## 2021-10-15 RX ADMIN — GUAIFENESIN SCH MG: 100 SOLUTION ORAL at 06:16

## 2021-10-15 RX ADMIN — GUAIFENESIN SCH MG: 100 SOLUTION ORAL at 12:52

## 2021-10-15 RX ADMIN — GUAIFENESIN SCH MG: 100 SOLUTION ORAL at 17:28

## 2021-10-15 NOTE — PCM.CONS
H&P History of Present Illness





- General


Date of Service: 10/15/21


Admit Problem/Dx: 


                           Admission Diagnosis/Problem





Admission Diagnosis/Problem      Hypoxia








Source of Information: Patient, Provider


History Limitations: Reports: No Limitations





- History of Present Illness


Initial Comments - Free Text/Narative: 





General Surgery is consulted for new finding of pneumomediastinum on imaging. 

The patient is a 80 yo woman who has been treated for COVID pneumonia for the 

past month. Within the past two days, she has had increased O2 requirement 

raising concern for possible underlying PE. Her WBC has also been climbing the 

past few days, from 12 now up to 18. CTA chest shows significant lung disease 

with massive pneumomediastinum. She also has a sizeable hiatal hernia. 

Clinically she appears comfortable and says she feels better today than 

yesterday. She is tolerating a diet without any problems. She denies chest pain 

or shortness of breath, but remains on high-flow nasal cannula to maintain 

saturation. 





- Related Data


Allergies/Adverse Reactions: 


                                    Allergies











Allergy/AdvReac Type Severity Reaction Status Date / Time


 


acetaminophen Allergy  Rash Verified 10/04/21 17:26


 


Beef Containing Products Allergy  Hives Verified 10/05/21 12:26


 


Pork/Porcine Containing Allergy  Hives Verified 10/05/21 12:25





Products     


 


soap Allergy  Rash Verified 10/05/21 12:27


 


codeine AdvReac  Nausea and Verified 10/05/21 12:25





   Vomiting  











Home Medications: 


                                    Home Meds





Cholecalciferol (Vitamin D3) [Vitamin D] 4,000 unit PO DAILY 10/05/21 [History]


Fish Oil/Borage/Flax/Om3,6,9 1 [Omega 3-6-9 Complex Softgel] 1 each PO DAILY 

10/05/21 [History]


Mv-Mn/Folic AC/Calcium/Vit K1 [Women 50 Plus Multivit Adv Tab] 1 tab PO DAILY 

10/05/21 [History]


Zinc 1 tab PO DAILY 10/05/21 [History]











Past Medical History


HEENT History: Reports: Cataract


Respiratory History: Reports: Other (See Below)


Other Respiratory History: meiners disease; covid pneumonia


Gastrointestinal History: Reports: GERD


Genitourinary History: Reports: None


OB/GYN History: Reports: None


Musculoskeletal History: Reports: None


Neurological History: Reports: None





- Infectious Disease History


Infectious Disease History: Reports: Chicken Pox, Influenza, Measles, Mumps, 

Novel Coronavirus





- Past Surgical History


HEENT Surgical History: Reports: Adenoidectomy, Cataract Surgery, Tonsillectomy


GI Surgical History: Reports: Hernia, Inguinal


Female  Surgical History: Reports: Hysterectomy


Musculoskeletal Surgical History: Reports: None





Social & Family History





- Tobacco Use


Tobacco Use Status *Q: Never Tobacco User





- Caffeine Use


Caffeine Use: Reports: Coffee, Tea





- Recreational Drug Use


Recreational Drug Use: No





H&P Review of Systems





- Review of Systems:


Review Of Systems: See Below


General: Reports: No Symptoms


HEENT: Reports: No Symptoms


Pulmonary: Reports: No Symptoms


Cardiovascular: Reports: No Symptoms


Gastrointestinal: Reports: No Symptoms


Genitourinary: Reports: No Symptoms


Musculoskeletal: Reports: No Symptoms


Skin: Reports: No Symptoms


Psychiatric: Reports: No Symptoms


Neurological: Reports: No Symptoms


Hematologic/Lymphatic: Reports: No Symptoms


Immunologic: Reports: No Symptoms





Exam





- Exam


Exam: See Below





- Vital Signs


Vital Signs: 


                                Last Vital Signs











Temp  36.6 C   10/15/21 08:41


 


Pulse  122 H  10/15/21 08:45


 


Resp  24 H  10/15/21 08:41


 


BP  116/70   10/15/21 10:19


 


Pulse Ox  91 L  10/15/21 09:14











Weight: 60.237 kg





- Exam


Quality Assessment: Supplemental Oxygen


General: Alert, Oriented, Cooperative


HEENT: Conjunctiva Clear, EOMI


Neck: Other (palpable crepitus)


Lungs: Clear to Auscultation, Normal Respiratory Effort


Cardiovascular: Other (radial pulse normal rate with regular skipped beats, 

distant heart sounds on auscultation, no rub or murmur)


GI/Abdominal Exam: Soft, Non-Tender


Extremities: Normal Inspection


Skin: Warm, Dry


Neuro Extensive - Mental Status: Alert, Oriented x3


Psychiatric: Normal Mood





- Patient Data


Lab Results Last 24 hrs: 


                         Laboratory Results - last 24 hr











  10/15/21 10/15/21 10/15/21 Range/Units





  05:04 05:04 05:04 


 


WBC  18.62 H    (3.98-10.04)  K/mm3


 


RBC  4.31    (3.98-5.22)  M/mm3


 


Hgb  13.0    (11.2-15.7)  gm/dl


 


Hct  38.6    (34.1-44.9)  %


 


MCV  89.6    (79.4-94.8)  fl


 


MCH  30.2    (25.6-32.2)  pg


 


MCHC  33.7    (32.2-35.5)  g/dl


 


RDW Std Deviation  47.4 H    (36.4-46.3)  fL


 


Plt Count  403 H    (182-369)  K/mm3


 


MPV  10.3    (9.4-12.3)  fl


 


Neut % (Auto)  85.4 H    (34.0-71.1)  %


 


Lymph % (Auto)  7.2 L    (19.3-51.7)  %


 


Mono % (Auto)  4.8    (4.7-12.5)  %


 


Eos % (Auto)  0.4 L    (0.7-5.8)  


 


Baso % (Auto)  0.1    (0.1-1.2)  %


 


Neut # (Auto)  15.89 H    (1.56-6.13)  K/mm3


 


Lymph # (Auto)  1.34    (1.18-3.74)  K/mm3


 


Mono # (Auto)  0.90 H    (0.24-0.36)  K/mm3


 


Eos # (Auto)  0.07    (0.04-0.36)  K/mm3


 


Baso # (Auto)  0.02    (0.01-0.08)  K/mm3


 


PT   25.5 H   (9.7-12.0)  SECONDS


 


INR   2.38   


 


D-Dimer, Quantitative     (0.19-0.50)  mg/L


 


Sodium    135 L  (136-145)  mEq/L


 


Potassium    4.3  (3.5-5.1)  mEq/L


 


Chloride    106  ()  mEq/L


 


Carbon Dioxide    23  (21-32)  mEq/L


 


Anion Gap    10.3  (5-15)  


 


BUN    43 H  (7-18)  mg/dL


 


Creatinine    0.7  (0.55-1.02)  mg/dL


 


Est Cr Clr Drug Dosing    49.18  mL/min


 


Estimated GFR (MDRD)    > 60  (>60)  mL/min


 


BUN/Creatinine Ratio    61.4 H  (14-18)  


 


Glucose    87  (70-99)  mg/dL


 


Calcium    8.7  (8.5-10.1)  mg/dL


 


Magnesium    1.8  (1.8-2.4)  mg/dL


 


C-Reactive Protein    1.4 H*  (<1.0)  mg/dL














  10/15/21 Range/Units





  05:04 


 


WBC   (3.98-10.04)  K/mm3


 


RBC   (3.98-5.22)  M/mm3


 


Hgb   (11.2-15.7)  gm/dl


 


Hct   (34.1-44.9)  %


 


MCV   (79.4-94.8)  fl


 


MCH   (25.6-32.2)  pg


 


MCHC   (32.2-35.5)  g/dl


 


RDW Std Deviation   (36.4-46.3)  fL


 


Plt Count   (182-369)  K/mm3


 


MPV   (9.4-12.3)  fl


 


Neut % (Auto)   (34.0-71.1)  %


 


Lymph % (Auto)   (19.3-51.7)  %


 


Mono % (Auto)   (4.7-12.5)  %


 


Eos % (Auto)   (0.7-5.8)  


 


Baso % (Auto)   (0.1-1.2)  %


 


Neut # (Auto)   (1.56-6.13)  K/mm3


 


Lymph # (Auto)   (1.18-3.74)  K/mm3


 


Mono # (Auto)   (0.24-0.36)  K/mm3


 


Eos # (Auto)   (0.04-0.36)  K/mm3


 


Baso # (Auto)   (0.01-0.08)  K/mm3


 


PT   (9.7-12.0)  SECONDS


 


INR   


 


D-Dimer, Quantitative  4.36 H  (0.19-0.50)  mg/L


 


Sodium   (136-145)  mEq/L


 


Potassium   (3.5-5.1)  mEq/L


 


Chloride   ()  mEq/L


 


Carbon Dioxide   (21-32)  mEq/L


 


Anion Gap   (5-15)  


 


BUN   (7-18)  mg/dL


 


Creatinine   (0.55-1.02)  mg/dL


 


Est Cr Clr Drug Dosing   mL/min


 


Estimated GFR (MDRD)   (>60)  mL/min


 


BUN/Creatinine Ratio   (14-18)  


 


Glucose   (70-99)  mg/dL


 


Calcium   (8.5-10.1)  mg/dL


 


Magnesium   (1.8-2.4)  mg/dL


 


C-Reactive Protein   (<1.0)  mg/dL











Result Diagrams: 


                                 10/15/21 05:04





                                 10/15/21 05:04





Sepsis Event Note





- Evaluation


Sepsis Screening Result: Sepsis Risk





- Focused Exam


Vital Signs: 


                                   Vital Signs











  Temp Temp Pulse Resp BP BP Pulse Ox


 


 10/15/21 10:19      116/70  


 


 10/15/21 09:14       


 


 10/15/21 08:45    122 H   91/62  


 


 10/15/21 08:41  36.6 C   122 H  24 H  91/62   91 L


 


 10/15/21 05:47       


 


 10/15/21 04:00   36.1 C   22 H   119/89  92 L














  Pulse Ox


 


 10/15/21 10:19 


 


 10/15/21 09:14  91 L


 


 10/15/21 08:45 


 


 10/15/21 08:41 


 


 10/15/21 05:47  92 L


 


 10/15/21 04:00 














Consult PN Assessment/Plan


Problem List Initiated/Reviewed/Updated: Yes


Plan: 





80 yo woman recovering from COIVD pneumonia now with increased O2 requirement, 

tachycardia, rising leukocytosis and new finding of significant 

pneumomediastinum, also with noted significant hiatal hernia. No significant 

pneumothorax to warrant tube thoracostomy. Findings may be benign and patient 

appears well on exam, but differential includes perforated viscus. Her rising 

WBC and continued requirement of high O2 supplement are concerning for 

complicating pneumonia. 


I recommend imaging with oral contrast to evaluate the esophagus and proximal 

stomach, and although the patient may not require a higher level of care now it 

would be better to have resources available immediately were her clinical status

 to deteriorate. I think she is at risk for this given her apparent frailty, 

leukocytosis, tachycardia, hypoxia and new finding of pneumomediastinum.

## 2021-10-15 NOTE — CT
CT chest

 

Technique: Multiple axial sections through the chest were obtained.  

Intravenous contrast was utilized.  Study has been performed as a 

pulmonary angiogram protocol.

 

Comparison: Prior CT chest study of 10/04/21.

 

Findings: Pulmonary arteries are well opacified.  No filling defects 

are seen to indicate pulmonary embolism.

 

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  Descending

 aorta is slightly ectatic.

 

Diffuse air is seen within the mediastinum extending into the chest 

wall and neck.  Mediastinal air also extends inferiorly within the 

mediastinum.  Minimal areas of pneumothorax are seen on both sides.

 

Moderately large hiatal hernia is noted.  Mediastinum shows no 

adenopathy.  No axillary adenopathy is seen.

 

Diffuse parenchymal density is seen throughout both lungs which 

appears stable from prior exam.

 

Bone window settings were reviewed which show scattered disc space 

narrowing within the spine with scattered endplate osteophytes.  No 

definite acute osseous abnormality is appreciated.

 

Impression:

1.  Diffuse mediastinal air is noted.  Air extends into the upper 

chest wall and neck.  Air is also noted inferiorly within the 

mediastinum.

2.  Small amount of pneumothorax is seen within both lung bases.

3.  Stable parenchymal change within both lungs.

4.  No findings of pulmonary embolism.

5.  Stable aneurysm of the ascending aorta.  Slight ectasia of the 

descending aorta is also noted.

 

Diagnostic code #3

## 2021-10-15 NOTE — CR
Chest: Portable view of the chest was obtained.

 

Comparison: Prior chest x-ray of 10/04/21.  Chest CT of 10/4/21 was 

also available.

 

Diffuse increased density is seen within both sides of chest.  

Findings are minimally improved on the right side from prior chest 

x-ray.  Heart size is normal.  Tortuous thoracic aorta is seen.  Small

 amount of air is noted within the upper chest wall and neck.  Minimal

 mediastinal air is seen.  Mild scoliosis is noted.

 

Impression:

1.  Subcutaneous air within the upper chest and neck.  Mild 

mediastinal air is seen.

2.  Minimal decreased density within the right chest from prior study.

  Other portions of the parenchymal densities are stable.

3.  Other findings are stable.

 

Diagnostic code #3

## 2021-10-15 NOTE — [id number].DCSUM1
**Discharge Summary





- Hospital Course


HPI Initial Comments: 


This is a 81-year-old female who presents to ED on 10/4/2021 with low oxygen 

saturations. Patient was hospitalized from 9/13/2021 through 9/27/2021 with 

COVID-19 pneumonia at [facility] in [place]. She was discharged home on 4 L

of oxygen with activity after completing 4 days of remdesivir and 10 days of 

Decadron. Of note patient was noted to have episodes of bradycardia and 

hypotension with heart rate in the 30s and 40s. She was noticed to have episodes

of Mobitz type I AV block and 2.5-second sinus pauses. It was believed this was 

due to remdesivir however this continued after stopping. Patient was to follow-

up with EP and have a 2-week cardiac monitor after discharge. Prior to 

presenting to the ED she was noted to have saturations in the low 50s. Patient's

 increased her oxygen to 8 L via nasal cannula. Denies any recent fever, 

chills, nausea, vomiting, diarrhea, urinary symptoms, or smoking history. Of 

note patient did have acute cystitis with an E. coli UTI well in the hospital in

[place] and completed 5 days of Rocephin there. 





In the ED twelve-lead EKG was obtained showing a sinus tachycardia at 109 bpm 

with a LAFB and very mild ST elevation in V2 and V3. She was placed on a 

nonrebreather mask which improved her saturations to 90 to 91%. She is noted to 

be dyspneic and only able to complete a few word sentences. Labs are obtained 

showing WBC of 8.57. Hemoglobin 13.0. Hematocrit 30.5. Platelet 250,000. 

Neutrophils are elevated 75.7%. D-dimer is very high at 8.16. Sodium is low at 

129. Potassium 3.8. Chloride 94. Carbon dioxide 24. Anion gap is 14.8. BUN is 

15. Creatinine 0.6. GFR greater than 60. Glucose is 126. Calcium 8.7. Magnesium 

1.8. Total bilirubin 0.6. AST is 36, ALT 31, alkaline phosphatase 86. Troponin 

is elevated at 0.127. CRP is very high at 29.3. Protein 7.1. Albumin 2.1. ABGs 

obtained in the left radial with a pH of 7.44. PCO2 of 30.9. PO2 of 58.0. HCO3 

of 20.5. O2 saturations 86%. Base excess is -2.3. Aa gradient is 15.0. This is 

obtained while on nonrebreather at 15 L. Facility was out of CPAP and BiPAP as 

they were being used on other patients and patient was started on high flow O2 

with saturations in the low 90s. CTA of the chest is obtained to rule out PE and

shows "1. No findings of pulmonary embolism. 2. Minimal left-sided pleural 

effusion. 3. Diffuse emphysematous changes seen. Both lungs show diffuse 

increased density uncertain how much of this represents diffuse fibrosis versus 

possible superimposed pneumonia. Old comparison studies would be needed if 

available. 4. 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 5. Other findings which are 

felt to be chronic as described above. Chest x-ray shows scattered infiltrates 

bilaterally most prominent in the left lower lobe. Plan was to transfer the 

patient for continued care due to elevated troponin however [place] beds [place] or [place]. Patient does request to be 

a DNR/DNI. Patient is noted to have a blood pressure of 80/61 and is given a 500

mill fluid bolus over 2 hours. Repeat blood pressure is 69/52 and another 500 mL

fluid bolus is given. proBNP is 3117. Repeat troponin is elevated at 0.156. UA 

is obtained and is mildly positive with slightly cloudy urine, 1+ protein, 3+ 

ketones, 2+ occult blood, 1+ leukocyte esterase, 5-10 RBCs, 20-30 WBCs, rare WBC

clumps, and moderate bacteria. Patient remained in her ER. Attempts were made to

decrease O2 but these were unsuccessful. Repeat troponin returns decreased at 

0.067. She is given 1 dose of Rocephin in the ED. She is also given 125 mg Solu-

Medrol and a DuoNeb, which she reports greatly helped.





Ultimately inpatient bed does open up at our facility and she is admitted to the

floor on telemetry for management of her hypoxia, suspected pneumonia, elevated 

BNP, elevated troponin - likely demand ischemia, and questionable UTI. She is a 

DNR/DNI. Her PCP is Dr. Alonzo Monroe in [place]. She carries a history of 

Mnire's disease, GERD, and prior Covid pneumonia.








Diagnosis: Stroke: No





- Discharge Data


Discharge Date: 10/15/21 (Admit date: 10/5/2021)


Discharge Disposition: DC/Tfer to Acute Hospital 02


Condition: Stable





- Referral to Home Health


Primary Care Physician: 


Alonzo Monroe MD








- Discharge Diagnosis/Problem(s)


(1) History of COVID-19


SNOMED Code(s): 219597154156295822, 974528150671918352


   ICD Code: Z86.16 - PERSONAL HISTORY OF COVID-19   Status: Chronic   Priority:

Medium   Current Visit: Yes   





(2) Elevated d-dimer


SNOMED Code(s): 587091522


   ICD Code: R79.89 - OTHER SPECIFIED ABNORMAL FINDINGS OF BLOOD CHEMISTRY   

Status: Acute   Priority: Medium   Current Visit: Yes   





(3) Elevated troponin


SNOMED Code(s): 571108835, 658631053, 188517806


   ICD Code: R77.8 - OTHER SPECIFIED ABNORMALITIES OF PLASMA PROTEINS   Status: 

Resolved   Priority: High   Current Visit: Yes   





(4) Pneumonia


SNOMED Code(s): 187075982


   ICD Code: J18.9 - PNEUMONIA, UNSPECIFIED ORGANISM   Status: Ruled-out   

Priority: High   Current Visit: Yes   


Qualifiers: 


   Pneumonia type: due to unspecified organism   Laterality: unspecified latera

lity   Lung location: unspecified part of lung   Qualified Code(s): J18.9 - 

Pneumonia, unspecified organism   





(5) Dyspnea


SNOMED Code(s): 097746102


   ICD Code: R06.00 - DYSPNEA, UNSPECIFIED   Status: Acute   Priority: High   

Current Visit: Yes   


Qualifiers: 


   Dyspnea type: unspecified   Qualified Code(s): R06.00 - Dyspnea, unspecified 

 





(6) Hypoxia


SNOMED Code(s): 157026407


   ICD Code: R09.02 - HYPOXEMIA   Status: Acute   Priority: High   Current 

Visit: Yes   





(7) UTI (urinary tract infection)


SNOMED Code(s): 95104328


   ICD Code: N39.0 - URINARY TRACT INFECTION, SITE NOT SPECIFIED   Status: 

Resolved   Priority: High   Current Visit: Yes   


Qualifiers: 


   Urinary tract infection type: site unspecified   Hematuria presence: without 

hematuria   Qualified Code(s): N39.0 - Urinary tract infection, site not 

specified   





(8) Elevated brain natriuretic peptide (BNP) level


SNOMED Code(s): 525647068, 100519598


   ICD Code: R79.89 - OTHER SPECIFIED ABNORMAL FINDINGS OF BLOOD CHEMISTRY   

Status: Acute   Priority: High   Current Visit: Yes   





(9) On home oxygen therapy


SNOMED Code(s): 392984577666


   ICD Code: Z99.81 - DEPENDENCE ON SUPPLEMENTAL OXYGEN   Status: Chronic   

Priority: Medium   Current Visit: Yes   





(10) GERD (gastroesophageal reflux disease)


SNOMED Code(s): 313075016


   ICD Code: K21.9 - GASTRO-ESOPHAGEAL REFLUX DISEASE WITHOUT ESOPHAGITIS   

Status: Chronic   Priority: Low   Current Visit: No   


Qualifiers: 


   Esophagitis presence: esophagitis presence not specified   Qualified Code(s):

K21.9 - Gastro-esophageal reflux disease without esophagitis   





(11) Meniere disease


SNOMED Code(s): 49111491


   ICD Code: H81.09 - MENIERE'S DISEASE, UNSPECIFIED EAR   Status: Chronic   

Priority: Low   Current Visit: No   


Qualifiers: 


   Laterality: unspecified laterality   Qualified Code(s): H81.09 - Meniere's 

disease, unspecified ear   





(12) DVT (deep venous thrombosis)


SNOMED Code(s): 530619005


   ICD Code: I82.409 - ACUTE EMBOLISM AND THOMBOS UNSP DEEP VN UNSP LOWER 

EXTREMITY   Status: Acute   Priority: High   Current Visit: Yes   


Qualifiers: 


   DVT location: lower extremity   Affected thrombotic vein of extremity: 

unspecified vein of extremity   Chronicity: acute   Laterality: left   Qualified

Code(s): I82.402 - Acute embolism and thrombosis of unspecified deep veins of 

left lower extremity   





(13) Hypotension


SNOMED Code(s): 49747105


   ICD Code: I95.9 - HYPOTENSION, UNSPECIFIED   Status: Resolved   Priority: 

High   Current Visit: Yes   


Qualifiers: 


   Hypotension type: unspecified hypotension type   Qualified Code(s): I95.9 - 

Hypotension, unspecified   





(14) Acute and chronic respiratory failure


SNOMED Code(s): 30063058


   ICD Code: J96.20 - ACUTE AND CHR RESP FAILURE, UNSP W HYPOXIA OR HYPERCAPNIA 

 Status: Acute   Priority: High   Current Visit: Yes   


Qualifiers: 


   Respiratory failure complication: hypoxia   Qualified Code(s): J96.21 - Acute

and chronic respiratory failure with hypoxia   





(15) Myocarditis


SNOMED Code(s): 53945962


   ICD Code: I51.4 - MYOCARDITIS, UNSPECIFIED   Status: Ruled-out   Priority: 

High   Current Visit: Yes   


Qualifiers: 


   Myocarditis type: infective   Infective myocarditis organism: viral   

Chronicity: acute   Qualified Code(s): I40.0 - Infective myocarditis   





(16) Hypokalemia


SNOMED Code(s): 03630747


   ICD Code: E87.6 - HYPOKALEMIA   Status: Resolved   Priority: High   Current 

Visit: Yes   





(17) Pulmonary fibrosis


SNOMED Code(s): 10088068


   ICD Code: J84.10 - PULMONARY FIBROSIS, UNSPECIFIED   Status: Suspected   

Priority: High   Current Visit: Yes   





(18) Diarrhea


SNOMED Code(s): 23226419


   ICD Code: R19.7 - DIARRHEA, UNSPECIFIED   Status: Resolved   Priority: Medium

  Current Visit: Yes   


Qualifiers: 


   Diarrhea type: unspecified type   Qualified Code(s): R19.7 - Diarrhea, 

unspecified   





(19) New onset a-fib


SNOMED Code(s): 17504318


   ICD Code: I48.91 - UNSPECIFIED ATRIAL FIBRILLATION   Status: Acute   

Priority: High   Current Visit: Yes   





(20) Pneumothorax


SNOMED Code(s): 23925361


   ICD Code: J93.9 - PNEUMOTHORAX, UNSPECIFIED   Status: Acute   Priority: High 

 Current Visit: Yes   


Qualifiers: 


   Pneumothorax type: unspecified pneumothorax   Qualified Code(s): J93.9 - 

Pneumothorax, unspecified   





(21) Pneumomediastinum


SNOMED Code(s): 55120582


   ICD Code: J98.2 - INTERSTITIAL EMPHYSEMA   Status: Acute   Priority: High   

Current Visit: Yes   





(22) Subcutaneous emphysema


SNOMED Code(s): 4855537


   ICD Code: T79.7XXA - TRAUMATIC SUBCUTANEOUS EMPHYSEMA, INITIAL ENCOUNTER   

Status: Acute   Priority: High   Current Visit: Yes   


Qualifiers: 


   Encounter type: initial encounter   Qualified Code(s): T79.7XXA - Traumatic 

subcutaneous emphysema, initial encounter   





- Patient Summary/Data


Consults: 


                                  Consultations





10/05/21 11:21


OT Evaluation and Treatment [CONS] Routine 


PT Evaluation and Treatment [CONS] Routine 


Respiratory Care Assess and Treatment [CONS] Routine 





10/08/21 06:55


SLP Evaluation and Treatment [CONS] Routine 





10/15/21 10:08


Consult to Spiritual Care [CONS] Routine 





10/15/21 12:44


Consult to Physician [CONS] Routine 











Labs Pending at D/C: 


None





Hospital Course: 


Admission assessment - 10/5/2021


* 81-year-old female who presents to ED on 10/4/2021 with low oxygen saturations


* History of Mnire's disease, GERD, and prior Covid pneumonia


* Was hospitalized from 9/13/2021 through 9/27/2021 with COVID-19 pneumonia at 

  [facility] in [place]. 


* Discharged home on 4 L of oxygen with activity after completing 4 days of 

  remdesivir and 10 days of Decadron. 


* Of note patient was noted to have episodes of bradycardia and hypotension with

   heart rate in the 30s and 40s. She was noticed to have episodes of Mobitz 

  type I AV block and 2.5-second sinus pauses. It was believed this was due to 

  remdesivir however this continued after stopping. Patient was to follow-up 

  with EP and have a 2-week cardiac monitor after discharge. 


* Prior to presenting to the ED she was noted to have saturations in the low 

  50s. 


* Patient's  increased her oxygen to 8 L via nasal cannula. 


* Denies any recent fever, chills, nausea, vomiting, diarrhea, urinary symptoms,

   or smoking history. 


* Of note patient did have acute cystitis with an E. coli UTI well in the 

  hospital in [place] and completed 5 days of Rocephin there. 


* 12-lead EKG was obtained showing a sinus tachycardia at 109 bpm with a LAFB 

  and very mild ST elevation in V2 and V3. 


* Placed on a nonrebreather mask which improved her saturations to 90 to 91%. 


* Noted to be dyspneic and only able to complete a few word sentences. 


* Labs are obtained:


   * WBC of 8.57. 


   * Hemoglobin 13.0. 


   * Hematocrit 30.5. 


   * Platelet 250,000. 


   * Neutrophils are elevated 75.7%. 


   * D-dimer is very high at 8.16. 


   * Sodium is low at 129. 


   * Potassium 3.8. 


   * Chloride 94. 


   * Carbon dioxide 24. 


   * Anion gap is 14.8. 


   * BUN is 15. Creatinine 0.6. GFR greater than 60. 


   * Glucose is 126. 


   * Calcium 8.7. 


   * Magnesium 1.8. 


   * Total bilirubin 0.6. 


   * AST is 36, ALT 31, alkaline phosphatase 86. 


   * Troponin is elevated at 0.127-->0.156-->0.067. 


   * CRP is very high at 29.3. 


   * Protein 7.1. 


   * Albumin 2.1. 


   * proBNP is 3117


   * UA is obtained and is mildly positive with slightly cloudy urine, 1+ 

     protein, 3+ ketones, 2+ occult blood, 1+ leukocyte esterase, 5-10 RBCs, 20-

     30 WBCs, rare WBC clumps, and moderate bacteria.


* ABG obtained in the left radial with a pH of 7.44. PCO2 of 30.9. PO2 of 58.0. 

  HCO3 of 20.5. O2 saturations 86%. Base excess is -2.3. Aa gradient is 15.0. 

  This is obtained while on nonrebreather at 15 L. 


* Facility was out of CPAP and BiPAP-> started on high flow O2 with saturations 

  in the low 90s. 


* CTA of the chest is obtained to rule out PE and shows:


   * 1. No findings of pulmonary embolism. 


   * 2. Minimal left-sided pleural effusion. 


   * 3. Diffuse emphysematous changes seen. Both lungs show diffuse increased 

     density uncertain how much of this represents diffuse fibrosis versus 

     possible superimposed pneumonia. Old comparison studies would be needed if 

     available. 


   * 4. 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 


   * 5. Other findings which are felt to be chronic as described above. 


* Chest x-ray shows scattered infiltrates bilaterally most prominent in the left

   lower lobe. 


* Plan was to transfer the patient for continued care due to elevated troponin 

  however [place] be [place] or 

  [place]. 


* Noted to have a blood pressure of 80/61 and is given a 500 mill fluid bolus 

  over 2 hours. 


* Given 1 dose of Rocephin in the ED. She is also given 125 mg Solu-Medrol and a

   DuoNeb, which she reports greatly helped.


* Ultimately inpatient bed does open up at our facility and she is admitted to 

  the floor on telemetry for management of her hypoxia, suspected pneumonia, 

  elevated BNP, elevated troponin - likely demand ischemia, and questionable 

  UTI. 


* On floor bilateral lower extremity ultrasound shows thrombus within the left 

  mid and distal superficial, femoral, popliteal, posterior tibial, and peroneal

   veins.





10/6/2021


This is a 81 years old female post Covid infection who was hospitalized in 

[place] for approximately 2 weeks who presented to our ED with low saturations.

  On admission D-dimer was noted to be elevated however CTA was negative for any

 PE.  Lower extremity ultrasound did show a DVT in the left extremity and 

patient was started on 1 mg/kg Lovenox transitioning to 10 mg twice daily 

Eliquis today.  Patient has been requiring high flow 60 L and was weaned down to

 80% FiO2 today.  Unfortunately given patient's history of recent Covid 

infection CTA was not very helpful in differentiating pulmonary cause.  Given 

recent hospitalization there are concerns of a secondary bacterial infection.  

WBC has been WNL however neutrophils are elevated.  Procalcitonin was obtained 

and was 0.29.  Lactic acid yesterday was 1.0.  Patient was noted to be 

hypotensive in the emergency room and was given 2 fluid boluses.  She was also 

started on fluids over concerns of sepsis.  UA was positive and urine culture is

 growing out greater than 100,000 CFU's of gram-negative rods thus far.  She 

does have a history of a E. coli UTI treated with Rocephin in [place].  Patient

 denies any urinary symptoms.  proBNP was elevated.  Echocardiogram was obtained

 on 10/5/2021  And interpreted as: " 1.  The left ventricular internal cavity 

size is normal. 2.  Normal left ventricular systolic function. 3.  Left 

ventricular ejection fraction, by visual estimation, is 55 to 60%. 4.  No reg

ional wall motion abnormalities. 5.  Mild proximal septal hypertrophy. 6.  

Aortic valve is tricuspid. 7.  Mild mitral valve regurgitation. 8.  Trace 

tricuspid valve regurgitation. 9.  Mild pulmonic valve regurgitation. 10.  The 

inferior vena cava is normal. 11.  The right ventricular systolic pressure is 

mildly elevated at 32.2 mmHg."  Patient was started on 20 mg IV push Lasix 3 

times daily yesterday and has had over 1 L output.  Potassium today is down to 

3.3 and this is being supplemented.  Sodium 137.  Carbon dioxide 24.  Anion gap 

is 13.3.  BUN is 19.  Creatinine 0.6.  GFR greater than 60.  Glucose is 146.  

Calcium 8.4.  Magnesium is 1.8.  Total bilirubin 0.5.  AST is 26, ALT 26, 

alkaline phosphatase 90.  Troponin today was down to 0.031.  CRP is down to 

24.2.  Albumin is down to 1.8.  Mycoplasma and strep pneumonia were both checked

 and were negative.  Respiratory viral panel was obtained and was negative 

however patient did return positive for Covid, which is unsurprising given the 

patient's recent known Covid infection.  We will continue current treatment 

plan.  It is felt it is prudent to continue IV antibiotics for now given 

patient's symptoms and lab results.  We will continue diuresis and Eliquis for 

DVT treatment.





10/7/2021


This is a 81-year-old female post Covid infection who was admitted for suspected

 pneumonia, pulmonary fibrosis, myocarditis, DVT, and apparent UTI.  Labs today 

show WBC of 8.94.  Hemoglobin is 11.1.  Neutrophils are elevated 77.7%.  Smear 

review feels slight toxic granulation and abnormal RBC morphology.  Sodium today

 is 140.  Potassium 3.7.  Chloride 103.  Carbon dioxide 20.  Anion gap is 12.7. 

 BUN is 25.  Creatinine 0.8.  GFR greater than 60.  Magnesium is 1.9.  Bilirubin

 0.5.  AST is 24, ALT 29, alkaline phosphatase is 90.  CRP is down to 12.5.  

Albumin is 1.8.  Patient does report that she feels quite a bit better today.  

She is down on high flow to 55 L with an FiO2 of 70% with saturations of upper 

80s to low 90s.  Urine culture continues to show greater than 100,000 CFU's of 

E. coli.  Sensitivities are pending.  Blood cultures have returned negative.  

Strep pneumonia is negative.  Discussed plan of care with Dr. Newsome who 

recommends patient receive 1 more dose of Lasix and then either discontinue or 

continue with 20 mg daily.  We will continue vancomycin and Zosyn due to 

concerns over possible pneumonia.  Unfortunately today patient reported that her

 insurance company tells her Eliquis will be $600 for 30-day supply.  She states

 she cannot afford this.  Apparently Xarelto is around the same pricing per case

 management.  We will therefore discontinue Eliquis start patient on 1 mg/kg 

Lovenox twice daily and start warfarin with pharmacy to dose.  Patient has had 

no fevers.  We will continue current treatment plan and hope to wean patient off

 of high flow.  Unknown length of stay due to severity of symptoms.





10/8/2021


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

 fibrosis, suspected myocarditis, DVT and UTI.  Labs today show WBC of 8.33.  

Hemoglobin is 11.7.  Neutrophils are 70.6.  INR is 1.20.  Sodium is 137.  

Potassium 3.2.  Chloride 98.  Carbon dioxide 29.  Anion gap is 13.2.  BUN is 29.

  Creatinine 1.0.  GFR 53.  Glucose 101.  CRP is up to 16.2.  Albumin is 1.9.  

Discussed plan of care with Dr. Russ, attending hospitalist.  We will at this 

time discontinue vancomycin and Zosyn and switch patient to Bactrim DS twice 

daily based on E. coli urine sensitivities.  We will repeat a procalcitonin.  We

 will also decrease Lasix to 20 mg IV push daily.  Patient's potassium will be 

supplemented.  Pharmacy continues to dose warfarin and patient remains on 1 

mg/kg twice daily Lovenox bridging.  Will start patient on 60 mg every 8 hour IV

 push methylprednisolone.  Patient has been reporting difficulty with swallowing

 pills.  Because of this we will order an SLP evaluation.  We have made some 

improvement with her high flow she is now at 55 L with an FiO2 of 65%.  We will 

continue to wean as patient tolerates.  Unknown length of stay due to severity 

of symptoms.





10/9/2021


81-year-old female with post Covid admitted for suspected pneumonia, pulmonary 

fibrosis, suspected myocarditis, DVT, and UTI developed A. fib with RVR with 

rates in the 110s to 120s+ this morning.  This required increasing oxygen 

support with high flow nasal cannula at 60 L with FiO2 of 95% and nonrebreather 

mask.  Patient continues to be DNR/DNI.  She was given 1 dose of Cardizem 10 mg 

IV x1.  Blood pressure was stable with systolic in the 120s.  After rate control

 was achieved we were able to remove the nonrebreather mask.  CRP continues to 

be elevated at 12.7.  She was started on methylprednisolone yesterday and 

switched vancomycin and Zosyn to Bactrim for her UTI.  Procalcitonin did return 

at 0.18 reassuring us that she does not have a severe bacterial sepsis.  Patient

 is getting warfarin secondary to DVT with INR today at 1.85.  She continues on 

Lovenox until therapeutic INR.


EKG shows diffuse T wave inversion consistent with heart strain.  Troponin was 

drawn and increased to 0.14.  Also suggesting some component of heart strain 

versus infarction.  EKG also demonstrates new onset atrial fibrillation.  She is

 not a candidate for transfer at this time and is already on full dose Lovenox 

and INR is 1.8 as we titrate up her warfarin.





10/10/2021


Patient has had a marginal improvement with high flow nasal cannula at 60 L and 

FiO2 of 90%.  She no longer has a nonrebreather mask over it.  Her INR is now 

supratherapeutic and her Lovenox will be held.  Pharmacy is dosing INR. 

Creatinine is 1.4 with estimated GFR of 36 and BUN of 49.  Lasix was stopped 

after the morning dose yesterday.  Today is day 6 of antibiotics for her UTI and

 she should be able to stop them tomorrow.  White count has increased to 12.5, 

but she has been placed on moderately high Solu-Medrol.  INR is also likely 

higher because of the Bactrim.  This will make getting her INR stabilized 

difficult.





10/11/2021


This is a 81-year-old female post Covid who was admitted due to acute on chronic

 respiratory failure.  She has been requiring high flow and is currently on 55 L

 with an FiO2 of 85%.  She is initially treated for possible pneumonia and given

 labs and CT results it appears she has more of a pulmonary fibrosis picture.  

She was noted to have a UTI and was switched to Bactrim DS and she will complete

 treatment today.  Questioning myocarditis.  She also has a left leg DVT and was

 started on Eliquis, however patient has concerns with affording this and she 

was switched to warfarin.  INR today is 4.21.  She was started on steroids and 

will be decreased to 60 mg IV push Solu-Medrol twice daily today.  Given the 

Bactrim and Solu-Medrol her INR has been somewhat variable with difficult to 

dose warfarin.  Pharmacy continues to dose this.  WBC today is 11.29.  

Hemoglobin 11.3.  Platelet 391,000.  Neutrophils are 87%.  Sodium 135.  

Potassium 4.4.  Chloride 104.  Carbon dioxide 25.  Anion gap 10.4.  BUN is 55.  

Creatinine 1.1.  GFR is up to 48.  Glucose is 126.  Phosphorus 3.4.  Magnesium 

2.2.  Total bilirubin 0.2.  AST is 18, ALT 24, alkaline phosphatase 62.  CRP is 

down to 3.2.  Protein is 5.9.  Albumin is down to 1.9.  Patient was complaining 

of some diarrhea today and given all of her antibiotic she was on we checked a 

C. difficile which was negative.  We will therefore schedule Imodium.  Unknown 

length of stay due to severity of symptoms and concern over minimal improvement 

in oxygenation.  We will look at possible swing bed/LTAC facilities in the 

future.





10/12/2021


This is a 81-year-old female post COVID-19 pneumonia who was admitted to the 

floor due to acute respiratory failure now believed to be secondary to pulmonary

 fibrosis.  She was also noted to have a left leg DVT and was started on Eliquis

 ultimately is being switched to warfarin with Lovenox bridging.  UA and culture

 were suggestive of E. coli UTI and she completed treatment for this.  Remains 

on high flow 50 L with an FiO2 of 90% and we have not been able to make much 

movement with this.  She continues to have dyspnea with exertion.  She has mild 

diarrhea which is improving.  C. difficile screen was negative.  Labs today show

 WBC which is elevated at 12.79, likely secondary to steroid use.  Platelet is 

423.  INR on warfarin is therapeutic at 2.50.  Sodium is 136.  Potassium 4.9.  

Chloride 104.  Carbon oxide 25.  Anion gap 11.9.  BUN is 55.  Creatinine 1.0.  

GFR is up to 53.  Calcium is 8.9.  Magnesium is 2.2.  CRP is down to 1.8.  

Overall she remains stable.   was in room today and all questions were 

answered.  We discussed the possibility of a transfer to an LTAC such as [facility] 

and [facility] and they are very interested in this.  We discussed how she may 

never recover from this and if she does it will be very slow progress.  She is 

currently on 60 mg twice daily Solu-Medrol and we will continue to wean this.  

Plan will be to discharge to LTAC once available.





10/13/2021


This is a 81-year-old female post Covid found to have pulmonary fibrosis.  She 

is remained in a sinus rhythm and there have been no calls on telemetry.  She is

 on high flow we have made some improvement there with 55 L and 80% FiO2.  Labs 

today show a WBC of 12.41, which is likely steroid related.  Platelets are 

393,000.  Neutrophils are 88.9%.  INR is a bit low at 1.96 and pharmacy is 

dosing warfarin for her DVT.  This will likely be a somewhat moving target due 

to patient requiring steroids.  She has completed treatment for UTI.  

Electrolytes have remained good.  Renal function has improved with a BUN of 51. 

 Creatinine 0.8.  GFR greater than 60.  CRP is down to 0.6.  We will decrease 

steroids today to 60 mg IV push methylprednisolone daily and continue to wean.  

We have discussed LTAC care with the family and they are in agreement to this as

 it is likely the best option for the patient.  [facility] in [facility] is reviewing 

patient chart at this time.  Patient will remain hospitalized until placement 

can be arranged.





10/14/2021


This is a 81-year-old female admitted to the floor for hypoxia post Covid and 

found to have pulmonary fibrosis.  WBC today is up to 17.2, likely steroid rela

etelvina.  We will decrease her steroids from 60 mg daily to 40 mg daily and continue

 to wean.  Hemoglobin is 12.7.  Platelets 432,000.  Neutrophils are elevated at 

84.4%.  INR is 2.39.  Sodium 135.  Potassium 4.6.  Chloride 106.  Carbon dioxide

 22.  Anion gap is 11.6.  BUN is 45.  Creatinine 0.7.  GFR greater than 60.  

Magnesium is 1.8.  CRP is down to 0.3.  She is currently on high flow oxygen 55 

L with an FiO2 of 70%.  We have been making very slow but fairly steady gains on

 her oxygen.  She reports she feels pretty good.  She remains on warfarin for 

her left lower extremity DVT.  She has been very pleasant and her  has 

been visiting regularly.  We are hopeful for LTAC placement in the near future.





10/15/2021


81-year-old female admitted to the floor for hypoxia.  She is post Covid and 

found to have pulmonary fibrosis.  Today she was noted to be tachycardic and 

have worsening saturations requiring higher levels of high flow.  CTA was 

obtained and shows "1. Diffuse mediastinal air is noted.  Air extends into the 

upper chest wall and neck.  There is also noted inferiorly within the 

mediastinum. 2.  Small amount of pneumothorax is seen within both lung bases. 3.

  Stable parenchymal change within both lungs. 4.  No findings of pulmonary 

embolism. 5.  Stable aneurysm of the ascending aorta.  Slight ectasia of the 

descending aorta is also noted."  Dr. Gonzalez, general surgeon is consulted who

 states patient is nonsurgical at this time but does recommend transfer due to 

risk of worsening condition and probable need for esophagram.  Contacted both 

hospitals in [place] in the [facility] transfer center however no beds 

are currently available.  We'll continue to work on this.  In the meantime we'll

 have nursing check every hour neck circumference.  We will check one-view 

portable chest x-ray at 1500 and repeat tomorrow morning, with serial daily 

chest x-rays thereafter.  Will make patient bedridden with bedside commode.  

Patient and  at bedside updated on plan and attempt to transfer. Labs 

today show worsening WBC of 18.62.  Hemoglobin is 13.0.  Platelet 403,000.  INR 

is stable at 2.38.  D-dimer is 4.36.  Sodium 135.  Potassium 4.3.  Chloride 106.

  Carbon dioxide 23.  Anion gap is 10.3.  BUN is 43.  Creatinine 0.7.  GFR 

greater than 60.  CRP is up to 1.4.  Magnesium 1.8.  Patient does report she 

feels better overall.  She does have subcutaneous emphysema noted in her neck.  

Her voice is also noted to be higher pitched and more nasally. We will make her 

n.p.o. for now in case of urgent surgical/airway intervention.





Later on in the day contacted by [facility] and notified 

that they do have a hospital bed open. Report given to Dr. Johnson who graciously

 accepts the patient for transfer. Patient is ultimately transferred to [place]

 via air ambulance.





Plan:: 


Pneumomediastinum


pneumothorax


subcutaneous emphysema


* General surgery consultation - Dr. Gonzalez


   * Denies any need for surgical intervention at this point


   * Recommends transfer for esophagram


* Repeat chest x-ray today at 1500


* Monitor daily chest x-rays


* Bedrest bedside commode for now


* Attempt transfer


   * Contacted both hospitals in [place] with no available beds


   * Contacted [facility] transfer center and they are working on finding 

     available bed





Dyspnea


Hypoxia


History of COVID-19


On home oxygen therapy


Acute on chronic respiratory failure 


Pulmonary fibrosis -on CT scan


* Droplet isolation (airborne/contact while on high flow O2)


* O2 as needed to keep saturation greater than 90%


* I-S/Acapella


* Discontinued zosyn and vancomycin on 10/8/2021


* High flow oxygen as directed


* 4 times daily scheduled DuoNebs


* Every 2 hours as needed albuterol nebulizer


* Consult RT


* Telemetry


* Blood cultures negative 


* Continuous pulse oximetry


* Mucinex BID


* Recommend PFT after recovery due to emphysematous changes noted in CTA


* Recommend pulmonology follow-up after discharge


* Repeat procalcitonin 0.18 on 10/8/2021


* Decrease solumedrol to 40mg daily 


* Continue to follow CRP


* Repeat CTA today to rule out PE due to worsening dyspnea and tachycardia





DVT


Elevated d-dimer


* CTA in ED negative for PE


* Discontinue Eliquis as patient reports $600 for 30 day supply


* Warfarin with pharmacy to dose 


* DC Lovenox 60mg (1mg/kg) BID as bridging 


* Daily INR





Myocarditis - ruled out


Elevated troponin, resolved 


Elevated brain natriuretic peptide (BNP) level


New onset A-fib


* Troponin increased returned to normal of 0.056 and she is back in sinus rhythm


* Metoprolol tartrate - 25mg BID


* Continue 81mg daily aspirin


* DC Cardizem and switch to PRN


* Echocardiogram obtained as noted 


* Monitor patient's weight


* Monitor I&O's


* Telemetry


* Continue to hold Lasix


* Supplement potassium as needed 


* Monitor CRP





UTI (urinary tract infection), Resolved 


* UA weakly positivepatient was treated for E. coli UTI with Rocephin in 

  [place]


* Urine culture showing >100cfu E. Coli, with good sensitivities to Bactrim DS


* Blood cultures negative thus far 


* Switch from zosyn/vancomycin to Bactrim DS BID PO --> Completed treatment on 

  10/11/2021





GERD (gastroesophageal reflux disease)


* No acute concerns


* Monitor 





Meniere disease


* No acute concerns


* Monitor





Hypokalemiaresolved


* Supplement as needed





Hypotension - resolved


* Noted in ED and multiple fluid boluses given


* Monitor 





Dysphagia


* Patient reports difficulty swallowing pills


* SLP evaluation -> crush pills





Diarrhea, improved to resolved 


* Check c. diff -->negative


* Continue PRN Imodium





Code status: DNR--> patient reports she would agree to be intubated if it came 

down to needing it. (Change from DNR/DNI on admission)


PCP: Dr. Monroe in [place]


DVT prophylaxis: Warfarin


Disposition: Patient admitted to the floor for management and further work-up of

 suspected pneumonia, hypoxia, elevated troponin, elevated D-dimer, and weak 

UTI.  





Length of stay greater than 96 hours due to need for continued treatment, new 

onset pneumothorax, pneumomediastinum, and subcutaneous emphysema.





Unfortunately very poor overall prognosis














- Patient Instructions


Diet: NPO





- Discharge Plan


*PRESCRIPTION DRUG MONITORING PROGRAM REVIEWED*: No


*COPY OF PRESCRIPTION DRUG MONITORING REPORT IN PATIENT ANGEL: No


Home Medications: 


                                    Home Meds





Cholecalciferol (Vitamin D3) [Vitamin D] 4,000 unit PO DAILY 10/05/21 [History]


Fish Oil/Borage/Flax/Om3,6,9 1 [Omega 3-6-9 Complex Softgel] 1 each PO DAILY 

10/05/21 [History]


Mv-Mn/Folic AC/Calcium/Vit K1 [Women 50 Plus Multivit Adv Tab] 1 tab PO DAILY 

10/05/21 [History]


Zinc 1 tab PO DAILY 10/05/21 [History]








Oxygen Therapy Mode: High Flow Nasal Cannula


Oxygen Flow Rate (L/min): 55


FiO2: 85


Patient Handouts:  Warfarin Information, Sepsis, Diagnosis, Adult, Warfarin 

tablets, Deep Vein Thrombosis, Venous Thromboembolism Prevention


Referrals: 


Alonzo Monroe MD [Primary Care Provider] - 





- Discharge Summary/Plan Comment


DC Time >30 min.: Yes


Total # of Minutes for Discharge Time: 





90





- General Info


Date of Service: 10/15/21


Admission Dx/Problem (Free Text: 


                           Admission Diagnosis/Problem





Admission Diagnosis/Problem      Hypoxia








Functional Status: Reports: Pain Controlled, Tolerating Diet, Ambulating, 

Urinating, Incentive Spirometry.  Denies: New Symptoms





- Review of Systems


General: Reports: Weakness, Fatigue, Malaise.  Denies: Fever, Chills


HEENT: Reports: Other (Crepitus in neck with changes to voice. Patient noted to 

have higher pitched and more nasally voice per ).  Denies: Headaches, 

Sore Throat


Pulmonary: Reports: Shortness of Breath, Cough, Sputum.  Denies: Pleuritic Chest

Pain, Wheezing


Cardiovascular: Reports: Dyspnea on Exertion.  Denies: Chest Pain, Palpitations,

Edema


Gastrointestinal: Reports: No Symptoms.  Denies: Abdominal Pain, Constipation, 

Diarrhea, Nausea, Vomiting


Genitourinary: Reports: No Symptoms.  Denies: Pain


Musculoskeletal: Reports: No Symptoms.  Denies: Neck Pain


Skin: Reports: No Symptoms.  Denies: Cyanosis


Neurological: Reports: Difficulty Walking (2/2 dyspnea), Weakness.  Denies: 

Confusion, Dizziness, Headache, Numbness, Pre-Existing Deficit, Seizure, 

Syncope, Tingling, Gait Disturbance


Psychiatric: Reports: No Symptoms





- Patient Data


Vitals - Most Recent: 


                                Last Vital Signs











Temp  97.9 F   10/15/21 08:41


 


Pulse  122 H  10/15/21 08:45


 


Resp  24 H  10/15/21 08:41


 


BP  116/70   10/15/21 10:19


 


Pulse Ox  93 L  10/15/21 15:55











Weight - Most Recent: 132 lb 12.8 oz


I&O - Last 24 hours: 


                                 Intake & Output











 10/15/21 10/15/21 10/15/21





 06:59 14:59 22:59


 


Intake Total 150 175 


 


Output Total 400  


 


Balance -250 175 











Lab Results - Last 24 hrs: 


                         Laboratory Results - last 24 hr











  10/15/21 10/15/21 10/15/21 Range/Units





  05:04 05:04 05:04 


 


WBC  18.62 H    (3.98-10.04)  K/mm3


 


RBC  4.31    (3.98-5.22)  M/mm3


 


Hgb  13.0    (11.2-15.7)  gm/dl


 


Hct  38.6    (34.1-44.9)  %


 


MCV  89.6    (79.4-94.8)  fl


 


MCH  30.2    (25.6-32.2)  pg


 


MCHC  33.7    (32.2-35.5)  g/dl


 


RDW Std Deviation  47.4 H    (36.4-46.3)  fL


 


Plt Count  403 H    (182-369)  K/mm3


 


MPV  10.3    (9.4-12.3)  fl


 


Neut % (Auto)  85.4 H    (34.0-71.1)  %


 


Lymph % (Auto)  7.2 L    (19.3-51.7)  %


 


Mono % (Auto)  4.8    (4.7-12.5)  %


 


Eos % (Auto)  0.4 L    (0.7-5.8)  


 


Baso % (Auto)  0.1    (0.1-1.2)  %


 


Neut # (Auto)  15.89 H    (1.56-6.13)  K/mm3


 


Lymph # (Auto)  1.34    (1.18-3.74)  K/mm3


 


Mono # (Auto)  0.90 H    (0.24-0.36)  K/mm3


 


Eos # (Auto)  0.07    (0.04-0.36)  K/mm3


 


Baso # (Auto)  0.02    (0.01-0.08)  K/mm3


 


PT   25.5 H   (9.7-12.0)  SECONDS


 


INR   2.38   


 


D-Dimer, Quantitative     (0.19-0.50)  mg/L


 


Sodium    135 L  (136-145)  mEq/L


 


Potassium    4.3  (3.5-5.1)  mEq/L


 


Chloride    106  ()  mEq/L


 


Carbon Dioxide    23  (21-32)  mEq/L


 


Anion Gap    10.3  (5-15)  


 


BUN    43 H  (7-18)  mg/dL


 


Creatinine    0.7  (0.55-1.02)  mg/dL


 


Est Cr Clr Drug Dosing    49.18  mL/min


 


Estimated GFR (MDRD)    > 60  (>60)  mL/min


 


BUN/Creatinine Ratio    61.4 H  (14-18)  


 


Glucose    87  (70-99)  mg/dL


 


Calcium    8.7  (8.5-10.1)  mg/dL


 


Magnesium    1.8  (1.8-2.4)  mg/dL


 


C-Reactive Protein    1.4 H*  (<1.0)  mg/dL














  10/15/21 Range/Units





  05:04 


 


WBC   (3.98-10.04)  K/mm3


 


RBC   (3.98-5.22)  M/mm3


 


Hgb   (11.2-15.7)  gm/dl


 


Hct   (34.1-44.9)  %


 


MCV   (79.4-94.8)  fl


 


MCH   (25.6-32.2)  pg


 


MCHC   (32.2-35.5)  g/dl


 


RDW Std Deviation   (36.4-46.3)  fL


 


Plt Count   (182-369)  K/mm3


 


MPV   (9.4-12.3)  fl


 


Neut % (Auto)   (34.0-71.1)  %


 


Lymph % (Auto)   (19.3-51.7)  %


 


Mono % (Auto)   (4.7-12.5)  %


 


Eos % (Auto)   (0.7-5.8)  


 


Baso % (Auto)   (0.1-1.2)  %


 


Neut # (Auto)   (1.56-6.13)  K/mm3


 


Lymph # (Auto)   (1.18-3.74)  K/mm3


 


Mono # (Auto)   (0.24-0.36)  K/mm3


 


Eos # (Auto)   (0.04-0.36)  K/mm3


 


Baso # (Auto)   (0.01-0.08)  K/mm3


 


PT   (9.7-12.0)  SECONDS


 


INR   


 


D-Dimer, Quantitative  4.36 H  (0.19-0.50)  mg/L


 


Sodium   (136-145)  mEq/L


 


Potassium   (3.5-5.1)  mEq/L


 


Chloride   ()  mEq/L


 


Carbon Dioxide   (21-32)  mEq/L


 


Anion Gap   (5-15)  


 


BUN   (7-18)  mg/dL


 


Creatinine   (0.55-1.02)  mg/dL


 


Est Cr Clr Drug Dosing   mL/min


 


Estimated GFR (MDRD)   (>60)  mL/min


 


BUN/Creatinine Ratio   (14-18)  


 


Glucose   (70-99)  mg/dL


 


Calcium   (8.5-10.1)  mg/dL


 


Magnesium   (1.8-2.4)  mg/dL


 


C-Reactive Protein   (<1.0)  mg/dL











Med Orders - Current: 


                               Current Medications





Al Hydroxide/Mg Hydroxide (Aluminum Hydroxide/Magnesium Hydroxide/Simethicone 

Susp 30 Ml Cup)  30 ml PO Q4H PRN


   PRN Reason: Heartburn


   Last Admin: 10/08/21 23:27 Dose:  30 ml


   Documented by: 


Albuterol (Albuterol 0.083% 2.5 Mg/3 Ml Neb Soln)  2.5 mg NEB Q2H PRN


   PRN Reason: Shortness Of Breath/wheezing


   Last Admin: 10/14/21 23:28 Dose:  2.5 mg


   Documented by: 


Albuterol/Ipratropium (Albuterol/Ipratropium 3.0-0.5 Mg/3 Ml Neb Soln)  3 ml NEB

QIDRT [facility]


   Last Admin: 10/15/21 15:55 Dose:  3 ml


   Documented by: 


Aspirin (Aspirin 81 Mg Tab.Ec)  81 mg PO DAILY [facility]


   Last Admin: 10/15/21 08:43 Dose:  81 mg


   Documented by: 


Diltiazem HCl (Diltiazem 50 Mg/10 Ml Sdv)  10 mg IVPUSH Q1H PRN


   PRN Reason: Tachycardia


   Last Admin: 10/15/21 09:29 Dose:  10 mg


   Documented by: 


Docusate Sodium (Docusate Sodium 100 Mg Cap)  100 mg PO Q12H PRN


   PRN Reason: Constipation


   Last Admin: 10/07/21 09:12 Dose:  100 mg


   Documented by: 


Guaifenesin (Guaifenesin 100 Mg/5 Ml Soln 10 Ml Ud Cup)  200 mg PO Q4H [facility]


   Last Admin: 10/15/21 12:52 Dose:  200 mg


   Documented by: 


Loperamide HCl (Loperamide 2 Mg Cap)  2 mg PO Q4H PRN


   PRN Reason: Diarrhea


   Last Admin: 10/14/21 13:11 Dose:  2 mg


   Documented by: 


Methylprednisolone Sodium Succinate (Methylprednisolone Sodium Succinate 40 Mg/1

Ml Sdv)  40 mg IVPUSH DAILY [facility]


   Last Admin: 10/15/21 08:43 Dose:  40 mg


   Documented by: 


Metoprolol Tartrate (Metoprolol Tartrate 25 Mg Tab)  25 mg PO BID [facility]


   Last Admin: 10/15/21 08:45 Dose:  25 mg


   Documented by: 


Ondansetron HCl (Ondansetron 4 Mg/2 Ml Sdv)  4 mg IV Q6H PRN


   PRN Reason: Nausea/Vomiting


Sodium Chloride (Sodium Chloride 0.9% 10 Ml Syringe)  10 ml FLUSH ASDIRECTED PRN


   PRN Reason: Keep Vein Open


   Last Admin: 10/04/21 18:12 Dose:  10 ml


   Documented by: 


Warfarin Sodium (Pharmacy To Dose - Warfarin)  1 dose .XX ASDIRECTED PRN


   PRN Reason: RX TOD DOSE WARFARIN


Warfarin Sodium (Warfarin 1 Mg Tab)  2 mg PO QPM [facility]


   Stop: 10/15/21 18:01





Discontinued Medications





Albuterol (Albuterol 0.083% 2.5 Mg/3 Ml Neb Soln)  2.5 mg NEB ONETIME ONE


   Stop: 10/04/21 15:19


   Last Admin: 10/04/21 15:47 Dose:  2.5 mg


   Documented by: 


Albuterol/Ipratropium (Albuterol/Ipratropium 3.0-0.5 Mg/3 Ml Neb Soln)  3 ml NEB

ONETIME ONE


   Stop: 10/05/21 08:21


   Last Admin: 10/05/21 08:37 Dose:  3 ml


   Documented by: 


Apixaban (Apixaban 5 Mg Tab)  10 mg PO BID [facility]


   Stop: 10/12/21 21:01


   Last Admin: 10/07/21 09:13 Dose:  10 mg


   Documented by: 


Diltiazem HCl (Diltiazem Ir 30 Mg Tab)  30 mg PO Q8HR [facility]


   Last Admin: 10/10/21 05:51 Dose:  30 mg


   Documented by: 


Enoxaparin Sodium (Enoxaparin 40 Mg/0.4 Ml Syringe)  40 mg SUBCUT DAILY [facility]


   Last Admin: 10/05/21 14:49 Dose:  40 mg


   Documented by: 


Enoxaparin Sodium (Enoxaparin 60 Mg/0.6 Ml Syringe)  20 mg SUBCUT ONETIME ONE


   Stop: 10/05/21 16:31


   Last Admin: 10/05/21 16:53 Dose:  20 mg


   Documented by: 


Enoxaparin Sodium (Enoxaparin 60 Mg/0.6 Ml Syringe)  60 mg SUBCUT Q12H [facility]


   Last Admin: 10/10/21 12:17 Dose:  Not Given


   Documented by: 


Furosemide (Furosemide 40 Mg/4 Ml Vial)  40 mg IVPUSH NOW ONE


   Stop: 10/05/21 20:04


   Last Admin: 10/05/21 21:33 Dose:  40 mg


   Documented by: 


Furosemide (Furosemide 20 Mg/2 Ml Vial)  20 mg IVPUSH TIDMEALS [facility]


   Last Admin: 10/08/21 06:34 Dose:  20 mg


   Documented by: 


Furosemide (Furosemide 20 Mg/2 Ml Vial)  20 mg IVPUSH DAILY [facility]


Furosemide (Furosemide 20 Mg/2 Ml Vial)  20 mg IVPUSH DAILY [facility]


   Last Admin: 10/09/21 09:05 Dose:  20 mg


   Documented by: 


Guaifenesin (Guaifenesin 600 Mg Tab.Er)  600 mg PO BID [facility]


   Last Admin: 10/14/21 11:37 Dose:  Not Given


   Documented by: 


Sodium Chloride (Normal Saline)  100 mls @ 60 mls/min IV ASDIRECTED [facility]


   Last Admin: 10/04/21 18:12 Dose:  60 mls/min


   Documented by: 


Sodium Chloride (Normal Saline)  1,000 mls @ 250 mls/hr IV ASDIRECTED [facility]


   Last Admin: 10/04/21 22:14 Dose:  250 mls/hr


   Documented by: 


Ceftriaxone Sodium 1 gm/ (Sodium Chloride)  100 mls @ 200 mls/hr IV ONETIME ONE


   Stop: 10/05/21 09:25


   Last Admin: 10/05/21 09:20 Dose:  200 mls/hr


   Documented by: 


Vancomycin HCl 1 gm/ Sodium (Chloride)  250 mls @ 250 mls/hr IV Q18H [facility]


   Last Admin: 10/05/21 14:26 Dose:  Not Given


   Documented by: 


Vancomycin HCl 1 gm/ Sodium (Chloride)  250 mls @ 250 mls/hr IV Q18H [facility]


   Last Admin: 10/05/21 15:37 Dose:  Not Given


   Documented by: 


Piperacillin Sod/Tazobactam (Sod 4.5 gm/ Sodium Chloride)  100 mls @ 200 mls/hr 

IV ONETIME ONE


   Stop: 10/05/21 14:59


   Last Admin: 10/05/21 14:19 Dose:  200 mls/hr


   Documented by: 


Piperacillin Sod/Tazobactam (Sod 4.5 gm/ Sodium Chloride)  100 mls @ 25 mls/hr 

IV Q8H [facility]


   Last Admin: 10/08/21 06:34 Dose:  25 mls/hr


   Documented by: 


Vancomycin HCl 1 gm/ Sodium (Chloride)  250 mls @ 250 mls/hr IV Q18H [facility]


   Stop: 10/07/21 23:59


   Last Admin: 10/07/21 22:00 Dose:  250 mls/hr


   Documented by: 


Sodium Chloride (Normal Saline)  1,000 mls @ 100 mls/hr IV ASDIRECTED [facility]


Vancomycin HCl 1 gm/ Sodium (Chloride)  250 mls @ 250 mls/hr IV Q12H [facility]


Potassium Chloride 10 meq/ (Premix)  100 mls @ 100 mls/hr IV Q1H [facility]


   Stop: 10/08/21 12:59


   Last Admin: 10/08/21 15:37 Dose:  100 mls/hr


   Documented by: 


Sodium Chloride (Normal Saline)  250 mls @ 40 mls/hr IV ASDIRECTED [facility]


   Last Admin: 10/08/21 15:51 Dose:  40 mls/hr


   Documented by: 


Sodium Chloride (Normal Saline)  100 mls @ 60 mls/hr IV ASDIRECTED [facility]


   Stop: 10/15/21 16:00


   Last Admin: 10/15/21 11:15 Dose:  60 mls/hr


   Documented by: 


Iopamidol (Iopamidol 755 Mg/Ml 100 Ml Bottle)  100 ml IVPUSH ONETIME ONE


   Stop: 10/04/21 18:12


   Last Admin: 10/04/21 18:12 Dose:  100 ml


   Documented by: 


Iopamidol (Iopamidol 755 Mg/Ml 100 Ml Bottle)  100 ml IVPUSH ONETIME ONE


   Stop: 10/15/21 11:00


   Last Admin: 10/15/21 11:15 Dose:  100 ml


   Documented by: 


Loperamide HCl (Loperamide 2 Mg Cap)  4 mg PO ONETIME ONE


   Stop: 10/11/21 10:51


   Last Admin: 10/11/21 11:06 Dose:  4 mg


   Documented by: 


Methylprednisolone Sodium Succinate (Methylprednisolone Sodium Succinate 125 

Mg/2 Ml Sdv)  125 mg IVPUSH ONETIME ONE


   Stop: 10/05/21 08:21


   Last Admin: 10/05/21 08:25 Dose:  125 mg


   Documented by: 


Methylprednisolone Sodium Succinate (Methylprednisolone Sodium Succinate 40 Mg/1

Ml Sdv)  60 mg IVPUSH Q8H [facility]


   Last Admin: 10/11/21 06:45 Dose:  60 mg


   Documented by: 


Methylprednisolone Sodium Succinate (Methylprednisolone Sodium Succinate 40 Mg/1

Ml Sdv)  60 mg IVPUSH Q12H [facility]


   Last Admin: 10/13/21 06:34 Dose:  60 mg


   Documented by: 


Methylprednisolone Sodium Succinate (Methylprednisolone Sodium Succinate 40 Mg/1

Ml Sdv)  60 mg IVPUSH DAILY [facility]


   Last Admin: 10/14/21 08:54 Dose:  60 mg


   Documented by: 


Metoprolol Tartrate (Metoprolol Tartrate 25 Mg Tab)  25 mg PO Q12H [facility]


   Last Admin: 10/12/21 21:19 Dose:  25 mg


   Documented by: 


Potassium Chloride (Potassium Chloride 10 Meq Tab.Er)  10 meq PO TID [facility]


   Last Admin: 10/07/21 22:00 Dose:  10 meq


   Documented by: 


Potassium Chloride (Potassium Chloride 20 Meq Tab.Er)  20 meq PO ONETIME ONE


   Stop: 10/06/21 07:21


   Last Admin: 10/06/21 08:37 Dose:  20 meq


   Documented by: 


Sodium Chloride (Sodium Chloride 0.9% 10 Ml Sdv)  10 ml FLUSH ONETIME ONE


   Stop: 10/04/21 18:12


   Last Admin: 10/04/21 20:31 Dose:  10 ml


   Documented by: 


Sodium Chloride (Sodium Chloride 0.9% 10 Ml Syringe)  10 ml FLUSH ONETIME PRN


   PRN Reason: Keep Vein Open


   Stop: 10/15/21 16:00


   Last Admin: 10/15/21 11:15 Dose:  10 ml


   Documented by: 


Trimethoprim/Sulfamethoxazole (Sulfamethoxazole/Trimethoprim 800-160 Mg Tab)  1 

tab PO BID [facility]


   Last Admin: 10/11/21 08:44 Dose:  1 tab


   Documented by: 


Vancomycin HCl (Pharmacy To Dose - Vancomycin)  1 dose .XX ASDIRECTED [facility]


Warfarin Sodium (Warfarin 4 Mg Tab)  4 mg PO QPM [facility]


   Stop: 10/07/21 18:01


   Last Admin: 10/07/21 18:08 Dose:  4 mg


   Documented by: 


Warfarin Sodium (Warfarin 4 Mg Tab)  4 mg PO QPM [facility]


   Stop: 10/08/21 18:01


   Last Admin: 10/08/21 18:36 Dose:  4 mg


   Documented by: 


Warfarin Sodium (Warfarin 4 Mg Tab)  4 mg PO QPM [facility]


   Stop: 10/09/21 18:01


   Last Admin: 10/09/21 18:15 Dose:  4 mg


   Documented by: 


Warfarin Sodium (Warfarin 1 Mg Tab)  1 mg PO QPM [facility]


   Last Admin: 10/10/21 18:04 Dose:  1 mg


   Documented by: 


Warfarin Sodium (Warfarin Sliding Scale)  0 each PO QPM [facility]


   Stop: 10/11/21 18:01


   Last Admin: 10/11/21 17:46 Dose:  Not Given


   Documented by: 


Warfarin Sodium (Warfarin 3 Mg Tab)  3 mg PO QPM [facility]


   Stop: 10/12/21 18:01


   Last Admin: 10/12/21 19:19 Dose:  3 mg


   Documented by: 


Warfarin Sodium (Warfarin 3 Mg Tab)  3 mg PO QPM [facility]


   Stop: 10/13/21 18:01


   Last Admin: 10/13/21 17:13 Dose:  3 mg


   Documented by: 


Warfarin Sodium (Warfarin 1 Mg Tab)  2 mg PO QPM [facility]


   Stop: 10/14/21 18:01


   Last Admin: 10/14/21 17:21 Dose:  2 mg


   Documented by: 











- Exam


Quality Assessment: Reports: Supplemental Oxygen, DVT Prophylaxis.  Denies: 

Urine Catheter


General: Reports: Alert, Oriented, Cooperative, No Acute Distress


HEENT: Reports: Pupils Equal, Pupils Reactive, Mucous Membr. Moist/Pink, Other 

(Subcutaneous emphysema noted in neck. Patient's voice noted to be more nasally 

and higher pitched.)


Neck: Reports: Supple, Trachea Midline


Lungs: Reports: Normal Respiratory Effort, Decreased Breath Sounds, Crackles.  

Denies: Rhonchi, Wheezing


Cardiovascular: Reports: Regular Rate, Tachycardia


GI/Abdominal Exam: Normal Bowel Sounds, Soft, Non-Tender, No Distention


 (Female) Exam: Deferred


Rectal (Female) Exam: Deferred


Back Exam: Reports: Normal Inspection, Full Range of Motion


Extremities: Normal Inspection, Normal Range of Motion, Non-Tender, No Pedal 

Edema, Normal Capillary Refill


Skin: Reports: Warm, Dry, Intact


Neurological: Reports: No New Focal Deficit


Psy/Mental Status: Reports: Alert, Normal Affect, Normal Mood

## 2021-10-15 NOTE — PCM.PN
- General Info


Date of Service: 10/15/21


Admission Dx/Problem (Free Text): 


                           Admission Diagnosis/Problem





Admission Diagnosis/Problem      Hypoxia








Functional Status: Reports: Pain Controlled, Tolerating Diet, Ambulating, 

Urinating, Incentive Spirometry.  Denies: New Symptoms





- Review of Systems


General: Reports: Weakness, Fatigue, Malaise.  Denies: Fever, Chills


HEENT: Reports: No Symptoms, Other (Crepitus noted in neck).  Denies: Headaches,

Sore Throat


Pulmonary: Reports: Shortness of Breath, Cough, Sputum.  Denies: Pleuritic Chest

Pain, Wheezing


Cardiovascular: Reports: No Symptoms, Dyspnea on Exertion.  Denies: Chest Pain, 

Palpitations, Lightheadedness


Gastrointestinal: Reports: No Symptoms.  Denies: Abdominal Pain, Constipation, 

Diarrhea, Nausea, Vomiting


Genitourinary: Reports: No Symptoms.  Denies: Pain


Musculoskeletal: Reports: No Symptoms


Skin: Reports: No Symptoms.  Denies: Cyanosis


Neurological: Reports: Difficulty Walking (2/2 dyspnea), Weakness, Change in 

Speech (Higher pitched and easily).  Denies: Confusion, Dizziness, Headache, 

Numbness, Seizure, Syncope, Tingling


Psychiatric: Reports: No Symptoms





- Patient Data


Vitals - Most Recent: 


                                Last Vital Signs











Temp  97 F   10/15/21 04:00


 


Pulse  122 H  10/15/21 08:45


 


Resp  22 H  10/15/21 04:00


 


BP  91/62   10/15/21 08:45


 


Pulse Ox  92 L  10/15/21 05:47











Weight - Most Recent: 132 lb 12.8 oz


I&O - Last 24 Hours: 


                                 Intake & Output











 10/14/21 10/15/21 10/15/21





 22:59 06:59 14:59


 


Intake Total 350 150 


 


Output Total 500 400 


 


Balance -150 -250 











Lab Results Last 24 Hours: 


                         Laboratory Results - last 24 hr











  10/15/21 10/15/21 10/15/21 Range/Units





  05:04 05:04 05:04 


 


WBC  18.62 H    (3.98-10.04)  K/mm3


 


RBC  4.31    (3.98-5.22)  M/mm3


 


Hgb  13.0    (11.2-15.7)  gm/dl


 


Hct  38.6    (34.1-44.9)  %


 


MCV  89.6    (79.4-94.8)  fl


 


MCH  30.2    (25.6-32.2)  pg


 


MCHC  33.7    (32.2-35.5)  g/dl


 


RDW Std Deviation  47.4 H    (36.4-46.3)  fL


 


Plt Count  403 H    (182-369)  K/mm3


 


MPV  10.3    (9.4-12.3)  fl


 


Neut % (Auto)  85.4 H    (34.0-71.1)  %


 


Lymph % (Auto)  7.2 L    (19.3-51.7)  %


 


Mono % (Auto)  4.8    (4.7-12.5)  %


 


Eos % (Auto)  0.4 L    (0.7-5.8)  


 


Baso % (Auto)  0.1    (0.1-1.2)  %


 


Neut # (Auto)  15.89 H    (1.56-6.13)  K/mm3


 


Lymph # (Auto)  1.34    (1.18-3.74)  K/mm3


 


Mono # (Auto)  0.90 H    (0.24-0.36)  K/mm3


 


Eos # (Auto)  0.07    (0.04-0.36)  K/mm3


 


Baso # (Auto)  0.02    (0.01-0.08)  K/mm3


 


PT   25.5 H   (9.7-12.0)  SECONDS


 


INR   2.38   


 


Sodium    135 L  (136-145)  mEq/L


 


Potassium    4.3  (3.5-5.1)  mEq/L


 


Chloride    106  ()  mEq/L


 


Carbon Dioxide    23  (21-32)  mEq/L


 


Anion Gap    10.3  (5-15)  


 


BUN    43 H  (7-18)  mg/dL


 


Creatinine    0.7  (0.55-1.02)  mg/dL


 


Est Cr Clr Drug Dosing    49.18  mL/min


 


Estimated GFR (MDRD)    > 60  (>60)  mL/min


 


BUN/Creatinine Ratio    61.4 H  (14-18)  


 


Glucose    87  (70-99)  mg/dL


 


Calcium    8.7  (8.5-10.1)  mg/dL


 


Magnesium    1.8  (1.8-2.4)  mg/dL


 


C-Reactive Protein    1.4 H*  (<1.0)  mg/dL











Med Orders - Current: 


                               Current Medications





Al Hydroxide/Mg Hydroxide (Aluminum Hydroxide/Magnesium Hydroxide/Simethicone 

Susp 30 Ml Cup)  30 ml PO Q4H PRN


   PRN Reason: Heartburn


   Last Admin: 10/08/21 23:27 Dose:  30 ml


   Documented by: 


Albuterol (Albuterol 0.083% 2.5 Mg/3 Ml Neb Soln)  2.5 mg NEB Q2H PRN


   PRN Reason: Shortness Of Breath/wheezing


   Last Admin: 10/14/21 23:28 Dose:  2.5 mg


   Documented by: 


Albuterol/Ipratropium (Albuterol/Ipratropium 3.0-0.5 Mg/3 Ml Neb Soln)  3 ml NEB

QIDRT [facility]


   Last Admin: 10/15/21 05:46 Dose:  3 ml


   Documented by: 


Aspirin (Aspirin 81 Mg Tab.Ec)  81 mg PO DAILY [facility]


   Last Admin: 10/15/21 08:43 Dose:  81 mg


   Documented by: 


Diltiazem HCl (Diltiazem 50 Mg/10 Ml Sdv)  10 mg IVPUSH Q1H PRN


   PRN Reason: Tachycardia


   Last Admin: 10/09/21 10:36 Dose:  10 mg


   Documented by: 


Docusate Sodium (Docusate Sodium 100 Mg Cap)  100 mg PO Q12H PRN


   PRN Reason: Constipation


   Last Admin: 10/07/21 09:12 Dose:  100 mg


   Documented by: 


Guaifenesin (Guaifenesin 100 Mg/5 Ml Soln 10 Ml Ud Cup)  200 mg PO Q4H [facility]


   Last Admin: 10/15/21 08:45 Dose:  200 mg


   Documented by: 


Loperamide HCl (Loperamide 2 Mg Cap)  2 mg PO Q4H PRN


   PRN Reason: Diarrhea


   Last Admin: 10/14/21 13:11 Dose:  2 mg


   Documented by: 


Methylprednisolone Sodium Succinate (Methylprednisolone Sodium Succinate 40 Mg/1

Ml Sdv)  40 mg IVPUSH DAILY [facility]


   Last Admin: 10/15/21 08:43 Dose:  40 mg


   Documented by: 


Metoprolol Tartrate (Metoprolol Tartrate 25 Mg Tab)  25 mg PO BID [facility]


   Last Admin: 10/15/21 08:45 Dose:  25 mg


   Documented by: 


Ondansetron HCl (Ondansetron 4 Mg/2 Ml Sdv)  4 mg IV Q6H PRN


   PRN Reason: Nausea/Vomiting


Sodium Chloride (Sodium Chloride 0.9% 10 Ml Syringe)  10 ml FLUSH ASDIRECTED PRN


   PRN Reason: Keep Vein Open


   Last Admin: 10/04/21 18:12 Dose:  10 ml


   Documented by: 


Warfarin Sodium (Pharmacy To Dose - Warfarin)  1 dose .XX ASDIRECTED PRN


   PRN Reason: RX TOD DOSE WARFARIN





Discontinued Medications





Albuterol (Albuterol 0.083% 2.5 Mg/3 Ml Neb Soln)  2.5 mg NEB ONETIME ONE


   Stop: 10/04/21 15:19


   Last Admin: 10/04/21 15:47 Dose:  2.5 mg


   Documented by: 


Albuterol/Ipratropium (Albuterol/Ipratropium 3.0-0.5 Mg/3 Ml Neb Soln)  3 ml NEB

ONETIME ONE


   Stop: 10/05/21 08:21


   Last Admin: 10/05/21 08:37 Dose:  3 ml


   Documented by: 


Apixaban (Apixaban 5 Mg Tab)  10 mg PO BID [facility]


   Stop: 10/12/21 21:01


   Last Admin: 10/07/21 09:13 Dose:  10 mg


   Documented by: 


Diltiazem HCl (Diltiazem Ir 30 Mg Tab)  30 mg PO Q8HR [facility]


   Last Admin: 10/10/21 05:51 Dose:  30 mg


   Documented by: 


Enoxaparin Sodium (Enoxaparin 40 Mg/0.4 Ml Syringe)  40 mg SUBCUT DAILY [facility]


   Last Admin: 10/05/21 14:49 Dose:  40 mg


   Documented by: 


Enoxaparin Sodium (Enoxaparin 60 Mg/0.6 Ml Syringe)  20 mg SUBCUT ONETIME ONE


   Stop: 10/05/21 16:31


   Last Admin: 10/05/21 16:53 Dose:  20 mg


   Documented by: 


Enoxaparin Sodium (Enoxaparin 60 Mg/0.6 Ml Syringe)  60 mg SUBCUT Q12H [facility]


   Last Admin: 10/10/21 12:17 Dose:  Not Given


   Documented by: 


Furosemide (Furosemide 40 Mg/4 Ml Vial)  40 mg IVPUSH NOW ONE


   Stop: 10/05/21 20:04


   Last Admin: 10/05/21 21:33 Dose:  40 mg


   Documented by: 


Furosemide (Furosemide 20 Mg/2 Ml Vial)  20 mg IVPUSH TIDMEALS [facility]


   Last Admin: 10/08/21 06:34 Dose:  20 mg


   Documented by: 


Furosemide (Furosemide 20 Mg/2 Ml Vial)  20 mg IVPUSH DAILY [facility]


Furosemide (Furosemide 20 Mg/2 Ml Vial)  20 mg IVPUSH DAILY [facility]


   Last Admin: 10/09/21 09:05 Dose:  20 mg


   Documented by: 


Guaifenesin (Guaifenesin 600 Mg Tab.Er)  600 mg PO BID [facility]


   Last Admin: 10/14/21 11:37 Dose:  Not Given


   Documented by: 


Sodium Chloride (Normal Saline)  100 mls @ 60 mls/min IV ASDIRECTED [facility]


   Last Admin: 10/04/21 18:12 Dose:  60 mls/min


   Documented by: 


Sodium Chloride (Normal Saline)  1,000 mls @ 250 mls/hr IV ASDIRECTED [facility]


   Last Admin: 10/04/21 22:14 Dose:  250 mls/hr


   Documented by: 


Ceftriaxone Sodium 1 gm/ (Sodium Chloride)  100 mls @ 200 mls/hr IV ONETIME ONE


   Stop: 10/05/21 09:25


   Last Admin: 10/05/21 09:20 Dose:  200 mls/hr


   Documented by: 


Vancomycin HCl 1 gm/ Sodium (Chloride)  250 mls @ 250 mls/hr IV Q18H [facility]


   Last Admin: 10/05/21 14:26 Dose:  Not Given


   Documented by: 


Vancomycin HCl 1 gm/ Sodium (Chloride)  250 mls @ 250 mls/hr IV Q18H [facility]


   Last Admin: 10/05/21 15:37 Dose:  Not Given


   Documented by: 


Piperacillin Sod/Tazobactam (Sod 4.5 gm/ Sodium Chloride)  100 mls @ 200 mls/hr 

IV ONETIME ONE


   Stop: 10/05/21 14:59


   Last Admin: 10/05/21 14:19 Dose:  200 mls/hr


   Documented by: 


Piperacillin Sod/Tazobactam (Sod 4.5 gm/ Sodium Chloride)  100 mls @ 25 mls/hr 

IV Q8H [facility]


   Last Admin: 10/08/21 06:34 Dose:  25 mls/hr


   Documented by: 


Vancomycin HCl 1 gm/ Sodium (Chloride)  250 mls @ 250 mls/hr IV Q18H [facility]


   Stop: 10/07/21 23:59


   Last Admin: 10/07/21 22:00 Dose:  250 mls/hr


   Documented by: 


Sodium Chloride (Normal Saline)  1,000 mls @ 100 mls/hr IV ASDIRECTED [facility]


Vancomycin HCl 1 gm/ Sodium (Chloride)  250 mls @ 250 mls/hr IV Q12H [facility]


Potassium Chloride 10 meq/ (Premix)  100 mls @ 100 mls/hr IV Q1H [facility]


   Stop: 10/08/21 12:59


   Last Admin: 10/08/21 15:37 Dose:  100 mls/hr


   Documented by: 


Sodium Chloride (Normal Saline)  250 mls @ 40 mls/hr IV ASDIRECTED [facility]


   Last Admin: 10/08/21 15:51 Dose:  40 mls/hr


   Documented by: 


Iopamidol (Iopamidol 755 Mg/Ml 100 Ml Bottle)  100 ml IVPUSH ONETIME ONE


   Stop: 10/04/21 18:12


   Last Admin: 10/04/21 18:12 Dose:  100 ml


   Documented by: 


Loperamide HCl (Loperamide 2 Mg Cap)  4 mg PO ONETIME ONE


   Stop: 10/11/21 10:51


   Last Admin: 10/11/21 11:06 Dose:  4 mg


   Documented by: 


Methylprednisolone Sodium Succinate (Methylprednisolone Sodium Succinate 125 

Mg/2 Ml Sdv)  125 mg IVPUSH ONETIME ONE


   Stop: 10/05/21 08:21


   Last Admin: 10/05/21 08:25 Dose:  125 mg


   Documented by: 


Methylprednisolone Sodium Succinate (Methylprednisolone Sodium Succinate 40 Mg/1

Ml Sdv)  60 mg IVPUSH Q8H [facility]


   Last Admin: 10/11/21 06:45 Dose:  60 mg


   Documented by: 


Methylprednisolone Sodium Succinate (Methylprednisolone Sodium Succinate 40 Mg/1

Ml Sdv)  60 mg IVPUSH Q12H [facility]


   Last Admin: 10/13/21 06:34 Dose:  60 mg


   Documented by: 


Methylprednisolone Sodium Succinate (Methylprednisolone Sodium Succinate 40 Mg/1

Ml Sdv)  60 mg IVPUSH DAILY [facility]


   Last Admin: 10/14/21 08:54 Dose:  60 mg


   Documented by: 


Metoprolol Tartrate (Metoprolol Tartrate 25 Mg Tab)  25 mg PO Q12H [facility]


   Last Admin: 10/12/21 21:19 Dose:  25 mg


   Documented by: 


Potassium Chloride (Potassium Chloride 10 Meq Tab.Er)  10 meq PO TID [facility]


   Last Admin: 10/07/21 22:00 Dose:  10 meq


   Documented by: 


Potassium Chloride (Potassium Chloride 20 Meq Tab.Er)  20 meq PO ONETIME ONE


   Stop: 10/06/21 07:21


   Last Admin: 10/06/21 08:37 Dose:  20 meq


   Documented by: 


Sodium Chloride (Sodium Chloride 0.9% 10 Ml Sdv)  10 ml FLUSH ONETIME ONE


   Stop: 10/04/21 18:12


   Last Admin: 10/04/21 20:31 Dose:  10 ml


   Documented by: 


Trimethoprim/Sulfamethoxazole (Sulfamethoxazole/Trimethoprim 800-160 Mg Tab)  1 

tab PO BID [facility]


   Last Admin: 10/11/21 08:44 Dose:  1 tab


   Documented by: 


Vancomycin HCl (Pharmacy To Dose - Vancomycin)  1 dose .XX ASDIRECTED [facility]


Warfarin Sodium (Warfarin 4 Mg Tab)  4 mg PO QPM [facility]


   Stop: 10/07/21 18:01


   Last Admin: 10/07/21 18:08 Dose:  4 mg


   Documented by: 


Warfarin Sodium (Warfarin 4 Mg Tab)  4 mg PO QPM [facility]


   Stop: 10/08/21 18:01


   Last Admin: 10/08/21 18:36 Dose:  4 mg


   Documented by: 


Warfarin Sodium (Warfarin 4 Mg Tab)  4 mg PO QPM [facility]


   Stop: 10/09/21 18:01


   Last Admin: 10/09/21 18:15 Dose:  4 mg


   Documented by: 


Warfarin Sodium (Warfarin 1 Mg Tab)  1 mg PO QPM [facility]


   Last Admin: 10/10/21 18:04 Dose:  1 mg


   Documented by: 


Warfarin Sodium (Warfarin Sliding Scale)  0 each PO QPM [facility]


   Stop: 10/11/21 18:01


   Last Admin: 10/11/21 17:46 Dose:  Not Given


   Documented by: 


Warfarin Sodium (Warfarin 3 Mg Tab)  3 mg PO QPM [facility]


   Stop: 10/12/21 18:01


   Last Admin: 10/12/21 19:19 Dose:  3 mg


   Documented by: 


Warfarin Sodium (Warfarin 3 Mg Tab)  3 mg PO QPM [facility]


   Stop: 10/13/21 18:01


   Last Admin: 10/13/21 17:13 Dose:  3 mg


   Documented by: 


Warfarin Sodium (Warfarin 1 Mg Tab)  2 mg PO QPM [facility]


   Stop: 10/14/21 18:01


   Last Admin: 10/14/21 17:21 Dose:  2 mg


   Documented by: 











- Exam


Quality Assessment: Supplemental Oxygen (55L with FiO2 at 85%), DVT Prophylaxis.

 No: Urine Catheter


General: Alert, Oriented, Cooperative, Mild Distress (looks ill)


HEENT: Pupils Equal, Pupils Reactive, Mucous Membr. Moist/Pink


Neck: Supple, Trachea Midline, Other (Crepitus noted in neck)


Lungs: Decreased Breath Sounds, Crackles.  No: Normal Respiratory Effort 

(Tachypnea)


Cardiovascular: Regular Rate, Tachycardia


GI/Abdominal Exam: Normal Bowel Sounds, Non-Tender, No Distention


 (Female) Exam: Deferred


Back Exam: Normal Inspection, Full Range of Motion


Extremities: Normal Inspection, Normal Range of Motion, Non-Tender, No Pedal 

Edema, Normal Capillary Refill


Skin: Warm, Dry, Intact


Neurological: No New Focal Deficit


Psy/Mental Status: Alert, Normal Affect, Normal Mood


  ** #1 Interpretation


EKG Date: 10/15/21


Time: 09:15


Rhythm: Other (Sinus tachycardia)


Axis: LAD-Left Axis Deviation (-53)


P-Wave: Present


QRS: Normal


ST-T: Normal


QT: Normal


Comparison: Change From Previous EKG


EKG Interpretation Comments: 


Sinus tachycardia at 119 bpm.  LAFB.  LVH pattern with secondary repolarization 

abnormality noted. LAFB.








- Patient Data


Lab Results Last 24 hrs: 


                         Laboratory Results - last 24 hr











  10/15/21 10/15/21 10/15/21 Range/Units





  05:04 05:04 05:04 


 


WBC  18.62 H    (3.98-10.04)  K/mm3


 


RBC  4.31    (3.98-5.22)  M/mm3


 


Hgb  13.0    (11.2-15.7)  gm/dl


 


Hct  38.6    (34.1-44.9)  %


 


MCV  89.6    (79.4-94.8)  fl


 


MCH  30.2    (25.6-32.2)  pg


 


MCHC  33.7    (32.2-35.5)  g/dl


 


RDW Std Deviation  47.4 H    (36.4-46.3)  fL


 


Plt Count  403 H    (182-369)  K/mm3


 


MPV  10.3    (9.4-12.3)  fl


 


Neut % (Auto)  85.4 H    (34.0-71.1)  %


 


Lymph % (Auto)  7.2 L    (19.3-51.7)  %


 


Mono % (Auto)  4.8    (4.7-12.5)  %


 


Eos % (Auto)  0.4 L    (0.7-5.8)  


 


Baso % (Auto)  0.1    (0.1-1.2)  %


 


Neut # (Auto)  15.89 H    (1.56-6.13)  K/mm3


 


Lymph # (Auto)  1.34    (1.18-3.74)  K/mm3


 


Mono # (Auto)  0.90 H    (0.24-0.36)  K/mm3


 


Eos # (Auto)  0.07    (0.04-0.36)  K/mm3


 


Baso # (Auto)  0.02    (0.01-0.08)  K/mm3


 


PT   25.5 H   (9.7-12.0)  SECONDS


 


INR   2.38   


 


Sodium    135 L  (136-145)  mEq/L


 


Potassium    4.3  (3.5-5.1)  mEq/L


 


Chloride    106  ()  mEq/L


 


Carbon Dioxide    23  (21-32)  mEq/L


 


Anion Gap    10.3  (5-15)  


 


BUN    43 H  (7-18)  mg/dL


 


Creatinine    0.7  (0.55-1.02)  mg/dL


 


Est Cr Clr Drug Dosing    49.18  mL/min


 


Estimated GFR (MDRD)    > 60  (>60)  mL/min


 


BUN/Creatinine Ratio    61.4 H  (14-18)  


 


Glucose    87  (70-99)  mg/dL


 


Calcium    8.7  (8.5-10.1)  mg/dL


 


Magnesium    1.8  (1.8-2.4)  mg/dL


 


C-Reactive Protein    1.4 H*  (<1.0)  mg/dL











Result Diagrams: 


                                 10/15/21 05:04





                                 10/15/21 05:04





Sepsis Event Note





- Evaluation


Sepsis Screening Result: Sepsis Risk





- Focused Exam


Vital Signs: 


                                   Vital Signs











  Temp Temp Pulse Resp BP BP Pulse Ox


 


 10/15/21 08:45    122 H   91/62  


 


 10/15/21 05:47       


 


 10/15/21 04:00   97 F   22 H   119/89  92 L


 


 10/14/21 23:29       


 


 10/14/21 22:00   97.6 F   20   109/90  89 L


 


 10/14/21 20:55  97.9 F   88  22 H  119/89   89 L


 


 10/14/21 20:51    85   119/89  


 


 10/14/21 20:49       














  Pulse Ox


 


 10/15/21 08:45 


 


 10/15/21 05:47  92 L


 


 10/15/21 04:00 


 


 10/14/21 23:29  91 L


 


 10/14/21 22:00 


 


 10/14/21 20:55 


 


 10/14/21 20:51 


 


 10/14/21 20:49  88 L














- Problem List & Annotations


(1) History of COVID-19


SNOMED Code(s): 694765162859939815, 085595086827769371


   Code(s): Z86.16 - PERSONAL HISTORY OF COVID-19   Status: Chronic   Priority: 

Medium   Current Visit: Yes   





(2) Elevated d-dimer


SNOMED Code(s): 824334047


   Code(s): R79.89 - OTHER SPECIFIED ABNORMAL FINDINGS OF BLOOD CHEMISTRY   

Status: Acute   Priority: Medium   Current Visit: Yes   





(3) Elevated troponin


SNOMED Code(s): 019390442, 633130308, 160659600


   Code(s): R77.8 - OTHER SPECIFIED ABNORMALITIES OF PLASMA PROTEINS   Status: 

Resolved   Priority: High   Current Visit: Yes   





(4) Pneumonia


SNOMED Code(s): 577500531


   Code(s): J18.9 - PNEUMONIA, UNSPECIFIED ORGANISM   Status: Ruled-out   

Priority: High   Current Visit: Yes   


Qualifiers: 


   Pneumonia type: due to unspecified organism   Laterality: unspecified 

laterality   Lung location: unspecified part of lung   Qualified Code(s): J18.9 

- Pneumonia, unspecified organism   





(5) Dyspnea


SNOMED Code(s): 249867828


   Code(s): R06.00 - DYSPNEA, UNSPECIFIED   Status: Acute   Priority: High   

Current Visit: Yes   


Qualifiers: 


   Dyspnea type: unspecified   Qualified Code(s): R06.00 - Dyspnea, unspecified 

 





(6) Hypoxia


SNOMED Code(s): 528104519


   Code(s): R09.02 - HYPOXEMIA   Status: Acute   Priority: High   Current Visit:

Yes   





(7) UTI (urinary tract infection)


SNOMED Code(s): 86287342


   Code(s): N39.0 - URINARY TRACT INFECTION, SITE NOT SPECIFIED   Status: Acute 

 Priority: High   Current Visit: Yes   


Qualifiers: 


   Urinary tract infection type: site unspecified   Hematuria presence: without 

hematuria   Qualified Code(s): N39.0 - Urinary tract infection, site not 

specified   





(8) Elevated brain natriuretic peptide (BNP) level


SNOMED Code(s): 935578100, 006305947


   Code(s): R79.89 - OTHER SPECIFIED ABNORMAL FINDINGS OF BLOOD CHEMISTRY   

Status: Acute   Priority: High   Current Visit: Yes   





(9) On home oxygen therapy


SNOMED Code(s): 829378744986


   Code(s): Z99.81 - DEPENDENCE ON SUPPLEMENTAL OXYGEN   Status: Chronic   

Priority: Medium   Current Visit: Yes   





(10) GERD (gastroesophageal reflux disease)


SNOMED Code(s): 316138643


   Code(s): K21.9 - GASTRO-ESOPHAGEAL REFLUX DISEASE WITHOUT ESOPHAGITIS   

Status: Chronic   Priority: Low   Current Visit: No   


Qualifiers: 


   Esophagitis presence: esophagitis presence not specified   Qualified Code(s):

K21.9 - Gastro-esophageal reflux disease without esophagitis   





(11) Meniere disease


SNOMED Code(s): 33957196


   Code(s): H81.09 - MENIERE'S DISEASE, UNSPECIFIED EAR   Status: Chronic   

Priority: Low   Current Visit: No   


Qualifiers: 


   Laterality: unspecified laterality   Qualified Code(s): H81.09 - Meniere's 

disease, unspecified ear   





(12) DVT (deep venous thrombosis)


SNOMED Code(s): 520559108


   Code(s): I82.409 - ACUTE EMBOLISM AND THOMBOS UNSP DEEP VN UNSP LOWER 

EXTREMITY   Status: Acute   Priority: High   Current Visit: Yes   


Qualifiers: 


   DVT location: lower extremity   Affected thrombotic vein of extremity: 

unspecified vein of extremity   Chronicity: acute   Laterality: left   Qualified

Code(s): I82.402 - Acute embolism and thrombosis of unspecified deep veins of 

left lower extremity   





(13) Hypotension


SNOMED Code(s): 84773997


   Code(s): I95.9 - HYPOTENSION, UNSPECIFIED   Status: Resolved   Priority: High

  Current Visit: Yes   


Qualifiers: 


   Hypotension type: unspecified hypotension type   Qualified Code(s): I95.9 - 

Hypotension, unspecified   





(14) Acute and chronic respiratory failure


SNOMED Code(s): 46735203


   Code(s): J96.20 - ACUTE AND CHR RESP FAILURE, UNSP W HYPOXIA OR HYPERCAPNIA  

Status: Acute   Priority: High   Current Visit: Yes   


Qualifiers: 


   Respiratory failure complication: hypoxia   Qualified Code(s): J96.21 - Acute

and chronic respiratory failure with hypoxia   





(15) Myocarditis


SNOMED Code(s): 66037387


   Code(s): I51.4 - MYOCARDITIS, UNSPECIFIED   Status: Suspected   Priority: 

High   Current Visit: Yes   


Qualifiers: 


   Myocarditis type: infective   Infective myocarditis organism: viral   

Chronicity: acute   Qualified Code(s): I40.0 - Infective myocarditis   





(16) Hypokalemia


SNOMED Code(s): 60598348


   Code(s): E87.6 - HYPOKALEMIA   Status: Resolved   Priority: High   Current 

Visit: Yes   





(17) Pulmonary fibrosis


SNOMED Code(s): 97013399


   Code(s): J84.10 - PULMONARY FIBROSIS, UNSPECIFIED   Status: Suspected   

Priority: High   Current Visit: Yes   





(18) Diarrhea


SNOMED Code(s): 15485177


   Code(s): R19.7 - DIARRHEA, UNSPECIFIED   Status: Acute   Priority: Medium   

Current Visit: Yes   


Qualifiers: 


   Diarrhea type: unspecified type   Qualified Code(s): R19.7 - Diarrhea, 

unspecified   





(19) New onset a-fib


SNOMED Code(s): 84669695


   Code(s): I48.91 - UNSPECIFIED ATRIAL FIBRILLATION   Status: Acute   Priority:

High   Current Visit: Yes   





(20) Pneumothorax


SNOMED Code(s): 23123740


   Code(s): J93.9 - PNEUMOTHORAX, UNSPECIFIED   Status: Acute   Priority: High  

Current Visit: Yes   


Qualifiers: 


   Pneumothorax type: unspecified pneumothorax   Qualified Code(s): J93.9 - 

Pneumothorax, unspecified   





(21) Pneumomediastinum


SNOMED Code(s): 91370031


   Code(s): J98.2 - INTERSTITIAL EMPHYSEMA   Status: Acute   Priority: High   

Current Visit: Yes   





(22) Subcutaneous emphysema


SNOMED Code(s): 5064507


   Code(s): T79.7XXA - TRAUMATIC SUBCUTANEOUS EMPHYSEMA, INITIAL ENCOUNTER   

Status: Acute   Priority: High   Current Visit: Yes   


Qualifiers: 


   Encounter type: initial encounter   Qualified Code(s): T79.7XXA - Traumatic 

subcutaneous emphysema, initial encounter   





- Problem List Review


Problem List Initiated/Reviewed/Updated: Yes





- My Orders


Last 24 Hours: 


My Active Orders





10/14/21 09:30


guaiFENesin [Robitussin]   200 mg PO Q4H 





10/15/21 09:00


methylPREDNISolone Sod Succ [Solu-MEDROL]   40 mg IVPUSH DAILY 














- Assessment


Assessment:: 


Admission assessment - 10/5/2021


* 81-year-old female who presents to ED on 10/4/2021 with low oxygen saturations


* History of Mnire's disease, GERD, and prior Covid pneumonia


* Was hospitalized from 9/13/2021 through 9/27/2021 with COVID-19 pneumonia at 

  [facility] in [place]. 


* Discharged home on 4 L of oxygen with activity after completing 4 days of 

  remdesivir and 10 days of Decadron. 


* Of note patient was noted to have episodes of bradycardia and hypotension with

  heart rate in the 30s and 40s. She was noticed to have episodes of Mobitz type

  I AV block and 2.5-second sinus pauses. It was believed this was due to 

  remdesivir however this continued after stopping. Patient was to follow-up 

  with EP and have a 2-week cardiac monitor after discharge. 


* Prior to presenting to the ED she was noted to have saturations in the low 

  50s. 


* Patient's  increased her oxygen to 8 L via nasal cannula. 


* Denies any recent fever, chills, nausea, vomiting, diarrhea, urinary symptoms,

  or smoking history. 


* Of note patient did have acute cystitis with an E. coli UTI well in the 

  hospital in [place] and completed 5 days of Rocephin there. 


* 12-lead EKG was obtained showing a sinus tachycardia at 109 bpm with a LAFB 

  and very mild ST elevation in V2 and V3. 


* Placed on a nonrebreather mask which improved her saturations to 90 to 91%. 


* Noted to be dyspneic and only able to complete a few word sentences. 


* Labs are obtained:


   * WBC of 8.57. 


   * Hemoglobin 13.0. 


   * Hematocrit 30.5. 


   * Platelet 250,000. 


   * Neutrophils are elevated 75.7%. 


   * D-dimer is very high at 8.16. 


   * Sodium is low at 129. 


   * Potassium 3.8. 


   * Chloride 94. 


   * Carbon dioxide 24. 


   * Anion gap is 14.8. 


   * BUN is 15. Creatinine 0.6. GFR greater than 60. 


   * Glucose is 126. 


   * Calcium 8.7. 


   * Magnesium 1.8. 


   * Total bilirubin 0.6. 


   * AST is 36, ALT 31, alkaline phosphatase 86. 


   * Troponin is elevated at 0.127-->0.156-->0.067. 


   * CRP is very high at 29.3. 


   * Protein 7.1. 


   * Albumin 2.1. 


   * proBNP is 3117


   * UA is obtained and is mildly positive with slightly cloudy urine, 1+ 

     protein, 3+ ketones, 2+ occult blood, 1+ leukocyte esterase, 5-10 RBCs, 20-

     30 WBCs, rare WBC clumps, and moderate bacteria.


* ABG obtained in the left radial with a pH of 7.44. PCO2 of 30.9. PO2 of 58.0. 

  HCO3 of 20.5. O2 saturations 86%. Base excess is -2.3. Aa gradient is 15.0. 

  This is obtained while on nonrebreather at 15 L. 


* Facility was out of CPAP and BiPAP-> started on high flow O2 with saturations 

  in the low 90s. 


* CTA of the chest is obtained to rule out PE and shows:


   * 1. No findings of pulmonary embolism. 


   * 2. Minimal left-sided pleural effusion. 


   * 3. Diffuse emphysematous changes seen. Both lungs show diffuse increased 

     density uncertain how much of this represents diffuse fibrosis versus 

     possible superimposed pneumonia. Old comparison studies would be needed if 

     available. 


   * 4. 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 


   * 5. Other findings which are felt to be chronic as described above. 


* Chest x-ray shows scattered infiltrates bilaterally most prominent in the left

  lower lobe. 


* Plan was to transfer the patient for continued care due to elevated troponin 

  however no beds are noted to be available [facility]

  [facility]. 


* Noted to have a blood pressure of 80/61 and is given a 500 mill fluid bolus 

  over 2 hours. 


* Given 1 dose of Rocephin in the ED. She is also given 125 mg Solu-Medrol and a

  DuoNeb, which she reports greatly helped.


* Ultimately inpatient bed does open up at our facility and she is admitted to 

  the floor on telemetry for management of her hypoxia, suspected pneumonia, 

  elevated BNP, elevated troponin - likely demand ischemia, and questionable 

  UTI. 


* On floor bilateral lower extremity ultrasound shows thrombus within the left 

  mid and distal superficial, femoral, popliteal, posterior tibial, and peroneal

  veins.





10/6/2021


This is a 81 years old female post Covid infection who was hospitalized in 

[place] for approximately 2 weeks who presented to our ED with low saturations.

 On admission D-dimer was noted to be elevated however CTA was negative for any 

PE.  Lower extremity ultrasound did show a DVT in the left extremity and patient

was started on 1 mg/kg Lovenox transitioning to 10 mg twice daily Eliquis today.

 Patient has been requiring high flow 60 L and was weaned down to 80% FiO2 

today.  Unfortunately given patient's history of recent Covid infection CTA was 

not very helpful in differentiating pulmonary cause.  Given recent 

hospitalization there are concerns of a secondary bacterial infection.  WBC has 

been WNL however neutrophils are elevated.  Procalcitonin was obtained and was 

0.29.  Lactic acid yesterday was 1.0.  Patient was noted to be hypotensive in 

the emergency room and was given 2 fluid boluses.  She was also started on 

fluids over concerns of sepsis.  UA was positive and urine culture is growing 

out greater than 100,000 CFU's of gram-negative rods thus far.  She does have a 

history of a E. coli UTI treated with Rocephin in [place].  Patient denies any 

urinary symptoms.  proBNP was elevated.  Echocardiogram was obtained on 10/5/

2021  And interpreted as: " 1.  The left ventricular internal cavity size is 

normal. 2.  Normal left ventricular systolic function. 3.  Left ventricular 

ejection fraction, by visual estimation, is 55 to 60%. 4.  No regional wall 

motion abnormalities. 5.  Mild proximal septal hypertrophy. 6.  Aortic valve is 

tricuspid. 7.  Mild mitral valve regurgitation. 8.  Trace tricuspid valve 

regurgitation. 9.  Mild pulmonic valve regurgitation. 10.  The inferior vena 

cava is normal. 11.  The right ventricular systolic pressure is mildly elevated 

at 32.2 mmHg."  Patient was started on 20 mg IV push Lasix 3 times daily 

yesterday and has had over 1 L output.  Potassium today is down to 3.3 and this 

is being supplemented.  Sodium 137.  Carbon dioxide 24.  Anion gap is 13.3.  BUN

is 19.  Creatinine 0.6.  GFR greater than 60.  Glucose is 146.  Calcium 8.4.  

Magnesium is 1.8.  Total bilirubin 0.5.  AST is 26, ALT 26, alkaline phosphatase

90.  Troponin today was down to 0.031.  CRP is down to 24.2.  Albumin is down to

1.8.  Mycoplasma and strep pneumonia were both checked and were negative.  

Respiratory viral panel was obtained and was negative however patient did return

positive for Covid, which is unsurprising given the patient's recent known Covid

infection.  We will continue current treatment plan.  It is felt it is prudent 

to continue IV antibiotics for now given patient's symptoms and lab results.  We

will continue diuresis and Eliquis for DVT treatment.





10/7/2021


This is a 81-year-old female post Covid infection who was admitted for suspected

pneumonia, pulmonary fibrosis, myocarditis, DVT, and apparent UTI.  Labs today 

show WBC of 8.94.  Hemoglobin is 11.1.  Neutrophils are elevated 77.7%.  Smear 

review feels slight toxic granulation and abnormal RBC morphology.  Sodium today

is 140.  Potassium 3.7.  Chloride 103.  Carbon dioxide 20.  Anion gap is 12.7.  

BUN is 25.  Creatinine 0.8.  GFR greater than 60.  Magnesium is 1.9.  Bilirubin 

0.5.  AST is 24, ALT 29, alkaline phosphatase is 90.  CRP is down to 12.5.  

Albumin is 1.8.  Patient does report that she feels quite a bit better today.  

She is down on high flow to 55 L with an FiO2 of 70% with saturations of upper 

80s to low 90s.  Urine culture continues to show greater than 100,000 CFU's of 

E. coli.  Sensitivities are pending.  Blood cultures have returned negative.  

Strep pneumonia is negative.  Discussed plan of care with Dr. Newsome who 

recommends patient receive 1 more dose of Lasix and then either discontinue or 

continue with 20 mg daily.  We will continue vancomycin and Zosyn due to co

ncerns over possible pneumonia.  Unfortunately today patient reported that her 

insurance company tells her Eliquis will be $600 for 30-day supply.  She states 

she cannot afford this.  Apparently Xarelto is around the same pricing per case 

management.  We will therefore discontinue Eliquis start patient on 1 mg/kg 

Lovenox twice daily and start warfarin with pharmacy to dose.  Patient has had 

no fevers.  We will continue current treatment plan and hope to wean patient off

of high flow.  Unknown length of stay due to severity of symptoms.





10/8/2021


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

fibrosis, suspected myocarditis, DVT and UTI.  Labs today show WBC of 8.33.  

Hemoglobin is 11.7.  Neutrophils are 70.6.  INR is 1.20.  Sodium is 137.  

Potassium 3.2.  Chloride 98.  Carbon dioxide 29.  Anion gap is 13.2.  BUN is 29.

 Creatinine 1.0.  GFR 53.  Glucose 101.  CRP is up to 16.2.  Albumin is 1.9.  

Discussed plan of care with Dr. Russ, attending hospitalist.  We will at this 

time discontinue vancomycin and Zosyn and switch patient to Bactrim DS twice 

daily based on E. coli urine sensitivities.  We will repeat a procalcitonin.  We

will also decrease Lasix to 20 mg IV push daily.  Patient's potassium will be 

supplemented.  Pharmacy continues to dose warfarin and patient remains on 1 

mg/kg twice daily Lovenox bridging.  Will start patient on 60 mg every 8 hour IV

push methylprednisolone.  Patient has been reporting difficulty with swallowing 

pills.  Because of this we will order an SLP evaluation.  We have made some im

provement with her high flow she is now at 55 L with an FiO2 of 65%.  We will 

continue to wean as patient tolerates.  Unknown length of stay due to severity 

of symptoms.





10/9/2021


81-year-old female with post Covid admitted for suspected pneumonia, pulmonary 

fibrosis, suspected myocarditis, DVT, and UTI developed A. fib with RVR with 

rates in the 110s to 120s+ this morning.  This required increasing oxygen 

support with high flow nasal cannula at 60 L with FiO2 of 95% and nonrebreather 

mask.  Patient continues to be DNR/DNI.  She was given 1 dose of Cardizem 10 mg 

IV x1.  Blood pressure was stable with systolic in the 120s.  After rate control

was achieved we were able to remove the nonrebreather mask.  CRP continues to be

elevated at 12.7.  She was started on methylprednisolone yesterday and switched 

vancomycin and Zosyn to Bactrim for her UTI.  Procalcitonin did return at 0.18 

reassuring us that she does not have a severe bacterial sepsis.  Patient is 

getting warfarin secondary to DVT with INR today at 1.85.  She continues on 

Lovenox until therapeutic INR.


EKG shows diffuse T wave inversion consistent with heart strain.  Troponin was 

drawn and increased to 0.14.  Also suggesting some component of heart strain 

versus infarction.  EKG also demonstrates new onset atrial fibrillation.  She is

not a candidate for transfer at this time and is already on full dose Lovenox 

and INR is 1.8 as we titrate up her warfarin.





10/10/2021


Patient has had a marginal improvement with high flow nasal cannula at 60 L and 

FiO2 of 90%.  She no longer has a nonrebreather mask over it.  Her INR is now 

supratherapeutic and her Lovenox will be held.  Pharmacy is dosing INR. 

Creatinine is 1.4 with estimated GFR of 36 and BUN of 49.  Lasix was stopped 

after the morning dose yesterday.  Today is day 6 of antibiotics for her UTI and

she should be able to stop them tomorrow.  White count has increased to 12.5, 

but she has been placed on moderately high Solu-Medrol.  INR is also likely 

higher because of the Bactrim.  This will make getting her INR stabilized 

difficult.





10/11/2021


This is a 81-year-old female post Covid who was admitted due to acute on chronic

respiratory failure.  She has been requiring high flow and is currently on 55 L 

with an FiO2 of 85%.  She is initially treated for possible pneumonia and given 

labs and CT results it appears she has more of a pulmonary fibrosis picture.  

She was noted to have a UTI and was switched to Bactrim DS and she will complete

treatment today.  Questioning myocarditis.  She also has a left leg DVT and was 

started on Eliquis, however patient has concerns with affording this and she was

switched to warfarin.  INR today is 4.21.  She was started on steroids and will 

be decreased to 60 mg IV push Solu-Medrol twice daily today.  Given the Bactrim 

and Solu-Medrol her INR has been somewhat variable with difficult to dose 

warfarin.  Pharmacy continues to dose this.  WBC today is 11.29.  Hemoglobin 

11.3.  Platelet 391,000.  Neutrophils are 87%.  Sodium 135.  Potassium 4.4.  

Chloride 104.  Carbon dioxide 25.  Anion gap 10.4.  BUN is 55.  Creatinine 1.1. 

GFR is up to 48.  Glucose is 126.  Phosphorus 3.4.  Magnesium 2.2.  Total 

bilirubin 0.2.  AST is 18, ALT 24, alkaline phosphatase 62.  CRP is down to 3.2.

 Protein is 5.9.  Albumin is down to 1.9.  Patient was complaining of some 

diarrhea today and given all of her antibiotic she was on we checked a C. diff

icile which was negative.  We will therefore schedule Imodium.  Unknown length 

of stay due to severity of symptoms and concern over minimal improvement in 

oxygenation.  We will look at possible swing bed/LTAC facilities in the future.





10/12/2021


This is a 81-year-old female post COVID-19 pneumonia who was admitted to the 

floor due to acute respiratory failure now believed to be secondary to pulmonary

fibrosis.  She was also noted to have a left leg DVT and was started on Eliquis 

ultimately is being switched to warfarin with Lovenox bridging.  UA and culture 

were suggestive of E. coli UTI and she completed treatment for this.  Remains on

high flow 50 L with an FiO2 of 90% and we have not been able to make much 

movement with this.  She continues to have dyspnea with exertion.  She has mild 

diarrhea which is improving.  C. difficile screen was negative.  Labs today show

WBC which is elevated at 12.79, likely secondary to steroid use.  Platelet is 

423.  INR on warfarin is therapeutic at 2.50.  Sodium is 136.  Potassium 4.9.  

Chloride 104.  Carbon oxide 25.  Anion gap 11.9.  BUN is 55.  Creatinine 1.0.  

GFR is up to 53.  Calcium is 8.9.  Magnesium is 2.2.  CRP is down to 1.8.  

Overall she remains stable.   was in room today and all questions were 

answered.  We discussed the possibility of a transfer to an LTAC such as [facility] 

and [facility] and they are very interested in this.  We discussed how she may 

never recover from this and if she does it will be very slow progress.  She is 

currently on 60 mg twice daily Solu-Medrol and we will continue to wean this.  

Plan will be to discharge to LTAC once available.





10/13/2021


This is a 81-year-old female post Covid found to have pulmonary fibrosis.  She 

is remained in a sinus rhythm and there have been no calls on telemetry.  She is

on high flow we have made some improvement there with 55 L and 80% FiO2.  Labs 

today show a WBC of 12.41, which is likely steroid related.  Platelets are 

393,000.  Neutrophils are 88.9%.  INR is a bit low at 1.96 and pharmacy is 

dosing warfarin for her DVT.  This will likely be a somewhat moving target due 

to patient requiring steroids.  She has completed treatment for UTI.  

Electrolytes have remained good.  Renal function has improved with a BUN of 51. 

Creatinine 0.8.  GFR greater than 60.  CRP is down to 0.6.  We will decrease 

steroids today to 60 mg IV push methylprednisolone daily and continue to wean.  

We have discussed LTAC care with the family and they are in agreement to this as

it is likely the best option for the patient.  [facility] in [facility] is reviewing 

patient chart at this time.  Patient will remain hospitalized until placement 

can be arranged.





10/14/2021


This is a 81-year-old female admitted to the floor for hypoxia post Covid and 

found to have pulmonary fibrosis.  WBC today is up to 17.2, likely steroid 

related.  We will decrease her steroids from 60 mg daily to 40 mg daily and 

continue to wean.  Hemoglobin is 12.7.  Platelets 432,000.  Neutrophils are 

elevated at 84.4%.  INR is 2.39.  Sodium 135.  Potassium 4.6.  Chloride 106.  

Carbon dioxide 22.  Anion gap is 11.6.  BUN is 45.  Creatinine 0.7.  GFR greater

than 60.  Magnesium is 1.8.  CRP is down to 0.3.  She is currently on high flow 

oxygen 55 L with an FiO2 of 70%.  We have been making very slow but fairly 

steady gains on her oxygen.  She reports she feels pretty good.  She remains on 

warfarin for her left lower extremity DVT.  She has been very pleasant and her 

 has been visiting regularly.  We are hopeful for LTAC placement in the 

near future.





10/15/2021


81-year-old female admitted to the floor for hypoxia.  She is post Covid and 

found to have pulmonary fibrosis.  Today she was noted to be tachycardic and 

have worsening saturations requiring higher levels of high flow.  CTA was obta

ined and shows "1. Diffuse mediastinal air is noted.  Air extends into the upper

chest wall and neck.  There is also noted inferiorly within the mediastinum. 2. 

Small amount of pneumothorax is seen within both lung bases. 3.  Stable 

parenchymal change within both lungs. 4.  No findings of pulmonary embolism. 5. 

Stable aneurysm of the ascending aorta.  Slight ectasia of the descending aorta 

is also noted."  Dr. Gonzalez, general surgeon is consulted who states patient 

is nonsurgical at this time but does recommend transfer due to risk of worsening

condition and probable need for esophagram.  Contacted both hospitals in 

[place] in the [facility] transfer center however no beds are currently

available.  We'll continue to work on this.  In the meantime we'll have nursing 

check every hour neck circumference.  We will check one-view portable chest x-

ray at 1500 and repeat tomorrow morning, with serial daily chest x-rays 

thereafter.  Will make patient bedridden with bedside commode.  Patient and 

 at bedside updated on plan and attempt to transfer. Labs today show 

worsening WBC of 18.62.  Hemoglobin is 13.0.  Platelet 403,000.  INR is stable 

at 2.38.  D-dimer is 4.36.  Sodium 135.  Potassium 4.3.  Chloride 106.  Carbon 

dioxide 23.  Anion gap is 10.3.  BUN is 43.  Creatinine 0.7.  GFR greater than 

60.  CRP is up to 1.4.  Magnesium 1.8.  Patient does report she feels better 

overall.  She does have subcutaneous emphysema noted in her neck.  Her voice is 

also noted to be higher pitched and more nasally.





- Plan


Plan:: 


Pneumomediastinum


pneumothorax


subcutaneous emphysema


* General surgery consultation - Dr. Gonzalez


   * Denies any need for surgical intervention at this point


   * Recommends transfer for esophagram


* Repeat chest x-ray today at 1500


* Monitor daily chest x-rays


* Bedrest bedside commode for now


* Attempt transfer


   * Contacted both hospitals in [place] with no available beds


   * Contacted [facility] transfer center and they are working on finding 

     available bed





Dyspnea


Hypoxia


History of COVID-19


On home oxygen therapy


Acute on chronic respiratory failure 


Pulmonary fibrosis -on CT scan


* Droplet isolation (airborne/contact while on high flow O2)


* O2 as needed to keep saturation greater than 90%


* I-S/Acapella


* Discontinued zosyn and vancomycin on 10/8/2021


* High flow oxygen as directed


* 4 times daily scheduled DuoNebs


* Every 2 hours as needed albuterol nebulizer


* Consult RT


* Telemetry


* Blood cultures negative 


* Continuous pulse oximetry


* Mucinex BID


* Recommend PFT after recovery due to emphysematous changes noted in CTA


* Recommend pulmonology follow-up after discharge


* Repeat procalcitonin 0.18 on 10/8/2021


* Decrease solumedrol to 40mg daily 


* Continue to follow CRP


* Repeat CTA today to rule out PE due to worsening dyspnea and tachycardia





DVT


Elevated d-dimer


* CTA in ED negative for PE


* Discontinue Eliquis as patient reports $600 for 30 day supply


* Warfarin with pharmacy to dose 


* DC Lovenox 60mg (1mg/kg) BID as bridging 


* Daily INR





Myocarditis - suspected 


Elevated troponin, resolved 


Elevated brain natriuretic peptide (BNP) level


New onset A-fib


* Troponin increased returned to normal of 0.056 and she is back in sinus rhythm


* Metoprolol tartrate - 25mg BID


* Continue 81mg daily aspirin


* DC Cardizem and switch to PRN


* Echocardiogram obtained as noted 


* Monitor patient's weight


* Monitor I&O's


* Telemetry


* Continue to hold Lasix


* Supplement potassium as needed 


* Monitor CRP





UTI (urinary tract infection), Resolved 


* UA weakly positivepatient was treated for E. coli UTI with Rocephin in 

  [place]


* Urine culture showing >100cfu E. Coli, with good sensitivities to Bactrim DS


* Blood cultures negative thus far 


* Switch from zosyn/vancomycin to Bactrim DS BID PO --> Completed treatment on 

  10/11/2021





GERD (gastroesophageal reflux disease)


* No acute concerns


* Monitor 





Meniere disease


* No acute concerns


* Monitor





Hypokalemiaresolved


* Supplement as needed





Hypotension - resolved


* Noted in ED and multiple fluid boluses given


* Monitor 





Dysphagia


* Patient reports difficulty swallowing pills


* SLP evaluation -> crush pills





Diarrhea, improved to resolved 


* Check c. diff -->negative


* Continue PRN Imodium





Code status: DNR--> patient reports she would agree to be intubated if it came 

down to needing it. 


PCP: Dr. Monroe in [place]


DVT prophylaxis: Warfarin


Disposition: Patient admitted to the floor for management and further work-up of

suspected pneumonia, hypoxia, elevated troponin, elevated D-dimer, and weak UTI.

 





Length of stay greater than 96 hours due to need for continued treatment, new 

onset pneumothorax, pneumomediastinum, and subcutaneous emphysema.





Unfortunately very poor overall prognosis